# Patient Record
Sex: FEMALE | Race: BLACK OR AFRICAN AMERICAN | Employment: PART TIME
[De-identification: names, ages, dates, MRNs, and addresses within clinical notes are randomized per-mention and may not be internally consistent; named-entity substitution may affect disease eponyms.]

---

## 2017-03-08 ENCOUNTER — TELEPHONE (OUTPATIENT)
Dept: OBGYN | Facility: CLINIC | Age: 52
End: 2017-03-08

## 2017-04-24 ENCOUNTER — OFFICE VISIT (OUTPATIENT)
Dept: OBGYN CLINIC | Age: 52
End: 2017-04-24
Payer: MEDICARE

## 2017-04-24 VITALS
BODY MASS INDEX: 23.49 KG/M2 | SYSTOLIC BLOOD PRESSURE: 147 MMHG | WEIGHT: 155 LBS | DIASTOLIC BLOOD PRESSURE: 84 MMHG | HEIGHT: 68 IN | RESPIRATION RATE: 18 BRPM | HEART RATE: 66 BPM

## 2017-04-24 DIAGNOSIS — N76.0 ACUTE VAGINITIS: Primary | ICD-10-CM

## 2017-04-24 PROCEDURE — 99213 OFFICE O/P EST LOW 20 MIN: CPT | Performed by: SPECIALIST

## 2017-04-24 RX ORDER — CLINDAMYCIN PHOSPHATE 20 MG/G
1 CREAM VAGINAL DAILY
Qty: 1 TUBE | Refills: 0 | Status: SHIPPED | OUTPATIENT
Start: 2017-04-24 | End: 2017-07-18 | Stop reason: SDUPTHER

## 2017-04-24 RX ORDER — FLUCONAZOLE 100 MG/1
100 TABLET ORAL DAILY
Qty: 7 TABLET | Refills: 0 | Status: SHIPPED | OUTPATIENT
Start: 2017-04-24 | End: 2017-05-01

## 2017-04-24 ASSESSMENT — ENCOUNTER SYMPTOMS
APNEA: 0
EYE PAIN: 0
NAUSEA: 0
COUGH: 0
ABDOMINAL PAIN: 0
DIARRHEA: 0
CONSTIPATION: 0
VOMITING: 0
ABDOMINAL DISTENTION: 0

## 2017-07-18 DIAGNOSIS — N76.0 ACUTE VAGINITIS: ICD-10-CM

## 2017-07-18 RX ORDER — CLINDAMYCIN PHOSPHATE 20 MG/G
1 CREAM VAGINAL DAILY
Qty: 1 TUBE | Refills: 0 | Status: SHIPPED | OUTPATIENT
Start: 2017-07-18 | End: 2018-02-15 | Stop reason: SDUPTHER

## 2017-11-14 ENCOUNTER — TELEPHONE (OUTPATIENT)
Dept: OBGYN CLINIC | Age: 52
End: 2017-11-14

## 2017-11-14 RX ORDER — CLINDAMYCIN AND BENZOYL PEROXIDE 10; 50 MG/G; MG/G
GEL TOPICAL
Status: CANCELLED | OUTPATIENT
Start: 2017-11-14

## 2017-11-14 RX ORDER — CLINDAMYCIN PHOSPHATE 20 MG/G
1 CREAM VAGINAL DAILY
Qty: 1 TUBE | Refills: 0 | Status: SHIPPED | OUTPATIENT
Start: 2017-11-14 | End: 2018-07-17 | Stop reason: SDUPTHER

## 2017-11-14 NOTE — TELEPHONE ENCOUNTER
Patient calls to schedule pap apt and mentioned that she would like prescription for cleocin for vaginal discharge/odor. Last seen in April of 2017.

## 2018-01-02 ENCOUNTER — APPOINTMENT (OUTPATIENT)
Dept: GENERAL RADIOLOGY | Age: 53
End: 2018-01-02
Payer: MEDICARE

## 2018-01-02 ENCOUNTER — APPOINTMENT (OUTPATIENT)
Dept: CT IMAGING | Age: 53
End: 2018-01-02
Payer: MEDICARE

## 2018-01-02 ENCOUNTER — HOSPITAL ENCOUNTER (EMERGENCY)
Age: 53
Discharge: HOME | End: 2018-01-02
Attending: EMERGENCY MEDICINE
Payer: MEDICARE

## 2018-01-02 VITALS
BODY MASS INDEX: 23.49 KG/M2 | SYSTOLIC BLOOD PRESSURE: 132 MMHG | OXYGEN SATURATION: 100 % | DIASTOLIC BLOOD PRESSURE: 81 MMHG | WEIGHT: 155 LBS | HEART RATE: 75 BPM | HEIGHT: 68 IN | RESPIRATION RATE: 12 BRPM | TEMPERATURE: 97.7 F
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
## 2018-01-02 DIAGNOSIS — S13.4XXA WHIPLASH INJURIES, INITIAL ENCOUNTER: Primary | ICD-10-CM

## 2018-01-02 DIAGNOSIS — S16.1XXA ACUTE STRAIN OF NECK MUSCLE, INITIAL ENCOUNTER: ICD-10-CM

## 2018-01-02 DIAGNOSIS — S39.012A BACK STRAIN, INITIAL ENCOUNTER: ICD-10-CM

## 2018-01-02 PROCEDURE — 72125 CT NECK SPINE W/O DYE: CPT

## 2018-01-02 PROCEDURE — 99284 EMERGENCY DEPT VISIT MOD MDM: CPT

## 2018-01-02 PROCEDURE — 96372 THER/PROPH/DIAG INJ SC/IM: CPT

## 2018-01-02 PROCEDURE — 72072 X-RAY EXAM THORAC SPINE 3VWS: CPT

## 2018-01-02 PROCEDURE — 6360000002 HC RX W HCPCS: Performed by: EMERGENCY MEDICINE

## 2018-01-02 PROCEDURE — 72100 X-RAY EXAM L-S SPINE 2/3 VWS: CPT

## 2018-01-02 RX ORDER — IBUPROFEN 800 MG/1
800 TABLET ORAL EVERY 8 HOURS PRN
Qty: 30 TABLET | Refills: 0 | Status: SHIPPED | OUTPATIENT
Start: 2018-01-02 | End: 2018-10-16 | Stop reason: ALTCHOICE

## 2018-01-02 RX ORDER — ORPHENADRINE CITRATE 30 MG/ML
60 INJECTION INTRAMUSCULAR; INTRAVENOUS ONCE
Status: COMPLETED | OUTPATIENT
Start: 2018-01-02 | End: 2018-01-02

## 2018-01-02 RX ORDER — CYCLOBENZAPRINE HCL 5 MG
5 TABLET ORAL 3 TIMES DAILY PRN
Qty: 30 TABLET | Refills: 0 | Status: SHIPPED | OUTPATIENT
Start: 2018-01-02 | End: 2018-01-12

## 2018-01-02 RX ORDER — KETOROLAC TROMETHAMINE 30 MG/ML
30 INJECTION, SOLUTION INTRAMUSCULAR; INTRAVENOUS ONCE
Status: COMPLETED | OUTPATIENT
Start: 2018-01-02 | End: 2018-01-02

## 2018-01-02 RX ADMIN — KETOROLAC TROMETHAMINE 30 MG: 30 INJECTION, SOLUTION INTRAMUSCULAR at 21:40

## 2018-01-02 RX ADMIN — ORPHENADRINE CITRATE 60 MG: 30 INJECTION INTRAMUSCULAR; INTRAVENOUS at 21:41

## 2018-01-02 ASSESSMENT — ENCOUNTER SYMPTOMS
SHORTNESS OF BREATH: 0
COUGH: 0
ABDOMINAL PAIN: 0
RHINORRHEA: 0
NAUSEA: 0
BACK PAIN: 1
VOMITING: 0

## 2018-01-02 ASSESSMENT — PAIN SCALES - GENERAL
PAINLEVEL_OUTOF10: 7
PAINLEVEL_OUTOF10: 7

## 2018-01-02 ASSESSMENT — PAIN DESCRIPTION - LOCATION: LOCATION: BACK;NECK

## 2018-01-02 ASSESSMENT — PAIN DESCRIPTION - ORIENTATION: ORIENTATION: MID;LOWER;UPPER

## 2018-01-02 ASSESSMENT — PAIN DESCRIPTION - PAIN TYPE: TYPE: ACUTE PAIN

## 2018-01-02 ASSESSMENT — PAIN DESCRIPTION - DESCRIPTORS: DESCRIPTORS: NUMBNESS
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
## 2018-02-15 DIAGNOSIS — N76.0 ACUTE VAGINITIS: ICD-10-CM

## 2018-02-15 RX ORDER — CLINDAMYCIN PHOSPHATE 20 MG/G
1 CREAM VAGINAL DAILY
Qty: 1 TUBE | Refills: 0 | Status: SHIPPED | OUTPATIENT
Start: 2018-02-15 | End: 2018-07-17 | Stop reason: SDUPTHER

## 2018-07-17 ENCOUNTER — HOSPITAL ENCOUNTER (OUTPATIENT)
Age: 53
Setting detail: SPECIMEN
Discharge: HOME | End: 2018-07-17
Payer: MEDICARE

## 2018-07-17 ENCOUNTER — OFFICE VISIT (OUTPATIENT)
Dept: OBGYN CLINIC | Age: 53
End: 2018-07-17
Payer: MEDICARE

## 2018-07-17 VITALS
HEIGHT: 68 IN | DIASTOLIC BLOOD PRESSURE: 89 MMHG | BODY MASS INDEX: 23.92 KG/M2 | HEART RATE: 59 BPM | WEIGHT: 157.8 LBS | SYSTOLIC BLOOD PRESSURE: 156 MMHG

## 2018-07-17 DIAGNOSIS — R45.86 MOOD SWINGS: ICD-10-CM

## 2018-07-17 DIAGNOSIS — N89.8 VAGINAL DISCHARGE: ICD-10-CM

## 2018-07-17 DIAGNOSIS — R23.2 HOT FLASH NOT DUE TO MENOPAUSE: ICD-10-CM

## 2018-07-17 DIAGNOSIS — Z12.39 BREAST CANCER SCREENING: ICD-10-CM

## 2018-07-17 DIAGNOSIS — N89.8 VAGINAL ODOR: ICD-10-CM

## 2018-07-17 DIAGNOSIS — Z11.3 SCREEN FOR STD (SEXUALLY TRANSMITTED DISEASE): ICD-10-CM

## 2018-07-17 DIAGNOSIS — Z12.4 SCREENING FOR CERVICAL CANCER: Primary | ICD-10-CM

## 2018-07-17 PROCEDURE — 99396 PREV VISIT EST AGE 40-64: CPT | Performed by: CLINICAL NURSE SPECIALIST

## 2018-07-17 RX ORDER — VENLAFAXINE HYDROCHLORIDE 37.5 MG/1
37.5 CAPSULE, EXTENDED RELEASE ORAL DAILY
Qty: 30 CAPSULE | Refills: 0 | Status: SHIPPED | OUTPATIENT
Start: 2018-07-17 | End: 2018-08-23 | Stop reason: DRUGHIGH

## 2018-07-17 RX ORDER — CLINDAMYCIN PHOSPHATE 20 MG/G
1 CREAM VAGINAL NIGHTLY
Qty: 1 TUBE | Refills: 0 | Status: SHIPPED | OUTPATIENT
Start: 2018-07-17 | End: 2018-09-28 | Stop reason: SDUPTHER

## 2018-07-17 RX ORDER — FLUCONAZOLE 100 MG/1
100 TABLET ORAL DAILY
Qty: 7 TABLET | Refills: 0 | Status: SHIPPED | OUTPATIENT
Start: 2018-07-17 | End: 2018-07-24

## 2018-07-17 NOTE — PROGRESS NOTES
[address] OB GYN  [facility] [address]  [address] [address]  Dept: [phone]        DATE OF VISIT:  18        History and Physical    Kandace Walsh    :  1965  CHIEF COMPLAINT:    Chief Complaint   Patient presents with    Annual Exam     Patient is here today for her annual breast exam, pap smear and medication refill. Patient complains of  white discharge and musty odor.  Gynecologic Exam    Medication Check    Vaginal Odor    Vaginal Discharge                    HPI :   Lebron Walsh is a 47 y.o. female who presents for annual well woman exam with pap and STD screening. Patient complains of vaginal discharge white creamy with odor for the past month. The patient was seen and examined. Per the patient bowels are regular. She has no voiding complaints.   She denies any bloating.  _____________________________________________________________________  Past Medical History:   Diagnosis Date    Chronic back pain     Fibroid uterus     Post-menopausal bleeding     UTI (urinary tract infection)                                                                    Past Surgical History:   Procedure Laterality Date    HYSTEROSCOPY  6/4/15    and D&C    OTHER SURGICAL HISTORY      vaginal warts removed    TONSILLECTOMY      at age 12   [address]     Family History   Problem Relation Age of Onset    High Blood Pressure Mother     High Blood Pressure Father     Heart Disease Father      History   Smoking Status    Current Every Day Smoker    Packs/day: 0.25    Years: 25.00    Types: Cigarettes   Smokeless Tobacco    Not on file     History   Alcohol Use    Yes     Comment: social     Current Outpatient Prescriptions   Medication Sig Dispense Refill    fluconazole (DIFLUCAN) 100 MG tablet Take 1 tablet by mouth daily for 7 days 7 tablet 0    clindamycin (CLEOCIN) 2 % vaginal cream Place 1 applicator vaginally nightly Insert into the vagina at

## 2018-07-18 DIAGNOSIS — A74.9 CHLAMYDIA: Primary | ICD-10-CM

## 2018-07-18 LAB
C TRACH DNA GENITAL QL NAA+PROBE: ABNORMAL
DIRECT EXAM: ABNORMAL
Lab: ABNORMAL
N. GONORRHOEAE DNA: NEGATIVE
SPECIMEN DESCRIPTION: ABNORMAL
STATUS: ABNORMAL

## 2018-07-18 RX ORDER — AZITHROMYCIN 500 MG/1
1000 TABLET, FILM COATED ORAL ONCE
Qty: 1 PACKET | Refills: 0 | Status: SHIPPED | OUTPATIENT
Start: 2018-07-18 | End: 2018-07-18

## 2018-08-02 LAB — CYTOLOGY REPORT: NORMAL

## 2018-08-23 ENCOUNTER — OFFICE VISIT (OUTPATIENT)
Dept: OBGYN CLINIC | Age: 53
End: 2018-08-23
Payer: MEDICARE

## 2018-08-23 ENCOUNTER — HOSPITAL ENCOUNTER (OUTPATIENT)
Age: 53
Setting detail: SPECIMEN
Discharge: HOME | End: 2018-08-23
Payer: MEDICARE

## 2018-08-23 VITALS
SYSTOLIC BLOOD PRESSURE: 126 MMHG | DIASTOLIC BLOOD PRESSURE: 72 MMHG | HEART RATE: 66 BPM | BODY MASS INDEX: 23.28 KG/M2 | WEIGHT: 153.6 LBS | HEIGHT: 68 IN

## 2018-08-23 DIAGNOSIS — Z11.3 SCREENING FOR STDS (SEXUALLY TRANSMITTED DISEASES): Primary | ICD-10-CM

## 2018-08-23 DIAGNOSIS — N95.1 HOT FLASH, MENOPAUSAL: ICD-10-CM

## 2018-08-23 DIAGNOSIS — N89.8 VAGINAL DISCHARGE: ICD-10-CM

## 2018-08-23 DIAGNOSIS — Z11.3 SCREENING FOR STDS (SEXUALLY TRANSMITTED DISEASES): ICD-10-CM

## 2018-08-23 PROCEDURE — 99213 OFFICE O/P EST LOW 20 MIN: CPT | Performed by: CLINICAL NURSE SPECIALIST

## 2018-08-23 PROCEDURE — G8427 DOCREV CUR MEDS BY ELIG CLIN: HCPCS | Performed by: CLINICAL NURSE SPECIALIST

## 2018-08-23 PROCEDURE — 4004F PT TOBACCO SCREEN RCVD TLK: CPT | Performed by: CLINICAL NURSE SPECIALIST

## 2018-08-23 PROCEDURE — 3017F COLORECTAL CA SCREEN DOC REV: CPT | Performed by: CLINICAL NURSE SPECIALIST

## 2018-08-23 PROCEDURE — G8420 CALC BMI NORM PARAMETERS: HCPCS | Performed by: CLINICAL NURSE SPECIALIST

## 2018-08-23 RX ORDER — METRONIDAZOLE 7.5 MG/G
GEL VAGINAL
Qty: 1 TUBE | Refills: 0 | Status: SHIPPED | OUTPATIENT
Start: 2018-08-23 | End: 2018-08-30

## 2018-08-23 RX ORDER — FLUCONAZOLE 100 MG/1
100 TABLET ORAL DAILY
Qty: 7 TABLET | Refills: 0 | Status: SHIPPED | OUTPATIENT
Start: 2018-08-23 | End: 2018-08-30

## 2018-08-23 RX ORDER — VENLAFAXINE HYDROCHLORIDE 75 MG/1
75 CAPSULE, EXTENDED RELEASE ORAL DAILY
Qty: 30 CAPSULE | Refills: 0 | Status: SHIPPED | OUTPATIENT
Start: 2018-08-23 | End: 2018-10-01 | Stop reason: SDUPTHER

## 2018-08-23 ASSESSMENT — ENCOUNTER SYMPTOMS
RESPIRATORY NEGATIVE: 1
EYES NEGATIVE: 1
ALLERGIC/IMMUNOLOGIC NEGATIVE: 1
GASTROINTESTINAL NEGATIVE: 1

## 2018-08-23 NOTE — PROGRESS NOTES
Subjective:      Patient ID:  Grace Gipson is a 47 y.o. female who presents for   Chief Complaint   Patient presents with    Sexually Transmitted Diseases     Patient is here today for a reculture.  Gynecologic Exam       HPI     Patient is a 48 yo female who presents for re-culture, was treated for chlamydia 4 weeks ago. Patient states that she did take all the medication and informed her partner. Patient denies any vaginal discharge itching odor or irritation. Patient states that the Effexor has not helped with her hot flashes. Review of Systems   Constitutional: Negative for chills and fever. HENT: Negative. Eyes: Negative. Respiratory: Negative. Cardiovascular: Negative. Gastrointestinal: Negative. Endocrine:        Hot flashes are unchanged since starting the effexor   Genitourinary: Negative for dysuria, menstrual problem and vaginal discharge. Musculoskeletal: Negative. Skin: Negative. Allergic/Immunologic: Negative. Neurological: Negative. Hematological: Negative. Psychiatric/Behavioral: Negative. /72 (Site: Right Arm, Position: Sitting, Cuff Size: Large Adult)   Pulse 66   Ht 5' 8\" (1.727 m)   Wt 153 lb 9.6 oz (69.7 kg)   LMP 02/28/2013   BMI 23.35 kg/m²    Patient's last menstrual period was 02/28/2013. Objective:   Physical Exam   Constitutional: She is oriented to person, place, and time. She appears well-developed and well-nourished. HENT:   Head: Normocephalic and atraumatic. Eyes: Conjunctivae are normal.   Neck: Normal range of motion. Neck supple. Cardiovascular: Normal rate and regular rhythm. Pulmonary/Chest: Effort normal and breath sounds normal.   Abdominal: Bowel sounds are normal.   Genitourinary: Vaginal discharge (moderate creamy white discharge with odor) found. Musculoskeletal: Normal range of motion. Neurological: She is alert and oriented to person, place, and time. Skin: Skin is warm and dry.    Psychiatric:

## 2018-08-24 DIAGNOSIS — A74.9 CHLAMYDIA: Primary | ICD-10-CM

## 2018-08-24 LAB
C TRACH DNA GENITAL QL NAA+PROBE: ABNORMAL
N. GONORRHOEAE DNA: NEGATIVE

## 2018-08-24 RX ORDER — AZITHROMYCIN 500 MG/1
1000 TABLET, FILM COATED ORAL ONCE
Qty: 2 TABLET | Refills: 0 | Status: SHIPPED | OUTPATIENT
Start: 2018-08-24 | End: 2018-08-24

## 2018-08-24 RX ORDER — DOXYCYCLINE HYCLATE 100 MG
100 TABLET ORAL 2 TIMES DAILY
Qty: 28 TABLET | Refills: 0 | Status: SHIPPED | OUTPATIENT
Start: 2018-08-24 | End: 2018-09-07

## 2018-09-04 ENCOUNTER — HOSPITAL ENCOUNTER (OUTPATIENT)
Dept: MAMMOGRAPHY | Age: 53
Discharge: HOME | End: 2018-09-06
Payer: MEDICARE

## 2018-09-04 DIAGNOSIS — Z12.39 BREAST CANCER SCREENING: ICD-10-CM

## 2018-09-04 PROCEDURE — 77067 SCR MAMMO BI INCL CAD: CPT

## 2018-09-06 ENCOUNTER — HOSPITAL ENCOUNTER (OUTPATIENT)
Age: 53
Setting detail: SPECIMEN
Discharge: HOME | End: 2018-09-06
Payer: MEDICARE

## 2018-09-06 ENCOUNTER — OFFICE VISIT (OUTPATIENT)
Dept: OBGYN CLINIC | Age: 53
End: 2018-09-06
Payer: MEDICARE

## 2018-09-06 VITALS
WEIGHT: 153 LBS | SYSTOLIC BLOOD PRESSURE: 130 MMHG | DIASTOLIC BLOOD PRESSURE: 80 MMHG | RESPIRATION RATE: 18 BRPM | HEART RATE: 81 BPM | BODY MASS INDEX: 23.26 KG/M2

## 2018-09-06 DIAGNOSIS — Z11.3 SCREEN FOR STD (SEXUALLY TRANSMITTED DISEASE): ICD-10-CM

## 2018-09-06 DIAGNOSIS — Z86.19 HISTORY OF CHLAMYDIA INFECTION: ICD-10-CM

## 2018-09-06 DIAGNOSIS — Z11.3 SCREEN FOR STD (SEXUALLY TRANSMITTED DISEASE): Primary | ICD-10-CM

## 2018-09-06 PROCEDURE — 3017F COLORECTAL CA SCREEN DOC REV: CPT | Performed by: CLINICAL NURSE SPECIALIST

## 2018-09-06 PROCEDURE — G8420 CALC BMI NORM PARAMETERS: HCPCS | Performed by: CLINICAL NURSE SPECIALIST

## 2018-09-06 PROCEDURE — 4004F PT TOBACCO SCREEN RCVD TLK: CPT | Performed by: CLINICAL NURSE SPECIALIST

## 2018-09-06 PROCEDURE — G8427 DOCREV CUR MEDS BY ELIG CLIN: HCPCS | Performed by: CLINICAL NURSE SPECIALIST

## 2018-09-06 PROCEDURE — 99213 OFFICE O/P EST LOW 20 MIN: CPT | Performed by: CLINICAL NURSE SPECIALIST

## 2018-09-06 ASSESSMENT — ENCOUNTER SYMPTOMS
ALLERGIC/IMMUNOLOGIC NEGATIVE: 1
EYES NEGATIVE: 1
RESPIRATORY NEGATIVE: 1
GASTROINTESTINAL NEGATIVE: 1

## 2018-09-06 NOTE — PROGRESS NOTES
place, and time. Skin: Skin is warm and dry. Psychiatric: She has a normal mood and affect. Her behavior is normal. Judgment and thought content normal.   Vitals reviewed. Assessment:      Diagnosis Orders   1. Screen for STD (sexually transmitted disease)  VAGINITIS DNA PROBE    Chlamydia Trachomatis & Neisseria gonorrhoeae (GC) by amplified detection   2. History of chlamydia infection  VAGINITIS DNA PROBE    Chlamydia Trachomatis & Neisseria gonorrhoeae (GC) by amplified detection           Plan:    patient informed that if Bv will give flagyl or clindamycin and patient is agreeable. Return for as needed. Patient was seen with total face to face time of 15 minutes. More than 50% of this visit was on counseling and education regarding the problems listed below and her options. She was also counseled on her preventative health maintenance recommendations and follow-up.     Electronically signed by: Savita Fu CNP

## 2018-09-07 LAB
C TRACH DNA GENITAL QL NAA+PROBE: NEGATIVE
DIRECT EXAM: NORMAL
Lab: NORMAL
N. GONORRHOEAE DNA: NEGATIVE
SPECIMEN DESCRIPTION: NORMAL
STATUS: NORMAL

## 2018-09-28 DIAGNOSIS — N89.8 VAGINAL DISCHARGE: ICD-10-CM

## 2018-09-28 DIAGNOSIS — N89.8 VAGINAL ODOR: ICD-10-CM

## 2018-10-01 DIAGNOSIS — N95.1 HOT FLASH, MENOPAUSAL: ICD-10-CM

## 2018-10-01 RX ORDER — VENLAFAXINE HYDROCHLORIDE 75 MG/1
75 CAPSULE, EXTENDED RELEASE ORAL DAILY
Qty: 30 CAPSULE | Refills: 3 | Status: SHIPPED | OUTPATIENT
Start: 2018-10-01 | End: 2019-01-21 | Stop reason: SDUPTHER

## 2018-10-01 RX ORDER — CLINDAMYCIN PHOSPHATE 20 MG/G
1 CREAM VAGINAL NIGHTLY
Qty: 1 TUBE | Refills: 0 | Status: SHIPPED | OUTPATIENT
Start: 2018-10-01 | End: 2019-01-21 | Stop reason: SDUPTHER

## 2018-10-16 ENCOUNTER — APPOINTMENT (OUTPATIENT)
Dept: GENERAL RADIOLOGY | Age: 53
End: 2018-10-16
Payer: MEDICARE

## 2018-10-16 ENCOUNTER — HOSPITAL ENCOUNTER (EMERGENCY)
Age: 53
Discharge: LEFT BEFORE DISCHARGE | End: 2018-10-16
Attending: EMERGENCY MEDICINE
Payer: MEDICARE

## 2018-10-16 VITALS
OXYGEN SATURATION: 98 % | SYSTOLIC BLOOD PRESSURE: 154 MMHG | TEMPERATURE: 98.2 F | HEIGHT: 68 IN | HEART RATE: 78 BPM | RESPIRATION RATE: 20 BRPM | BODY MASS INDEX: 23.49 KG/M2 | WEIGHT: 155 LBS | DIASTOLIC BLOOD PRESSURE: 77 MMHG

## 2018-10-16 DIAGNOSIS — J06.9 VIRAL URI WITH COUGH: Primary | ICD-10-CM

## 2018-10-16 PROCEDURE — 71046 X-RAY EXAM CHEST 2 VIEWS: CPT

## 2018-10-16 PROCEDURE — 96374 THER/PROPH/DIAG INJ IV PUSH: CPT

## 2018-10-16 PROCEDURE — 6360000002 HC RX W HCPCS: Performed by: STUDENT IN AN ORGANIZED HEALTH CARE EDUCATION/TRAINING PROGRAM

## 2018-10-16 PROCEDURE — 99283 EMERGENCY DEPT VISIT LOW MDM: CPT

## 2018-10-16 RX ORDER — ALBUTEROL SULFATE 90 UG/1
2 AEROSOL, METERED RESPIRATORY (INHALATION) EVERY 4 HOURS PRN
Qty: 1 INHALER | Refills: 0 | Status: SHIPPED | OUTPATIENT
Start: 2018-10-16 | End: 2019-05-14 | Stop reason: ALTCHOICE

## 2018-10-16 RX ORDER — KETOROLAC TROMETHAMINE 30 MG/ML
30 INJECTION, SOLUTION INTRAMUSCULAR; INTRAVENOUS ONCE
Status: COMPLETED | OUTPATIENT
Start: 2018-10-16 | End: 2018-10-16

## 2018-10-16 RX ORDER — BENZONATATE 100 MG/1
100 CAPSULE ORAL 3 TIMES DAILY PRN
Qty: 10 CAPSULE | Refills: 0 | Status: SHIPPED | OUTPATIENT
Start: 2018-10-16 | End: 2018-11-19 | Stop reason: SDUPTHER

## 2018-10-16 RX ORDER — IBUPROFEN 600 MG/1
600 TABLET ORAL EVERY 6 HOURS PRN
Qty: 30 TABLET | Refills: 0 | Status: SHIPPED | OUTPATIENT
Start: 2018-10-16 | End: 2018-10-22 | Stop reason: SDUPTHER

## 2018-10-16 RX ORDER — GUAIFENESIN/DEXTROMETHORPHAN 100-10MG/5
5 SYRUP ORAL 3 TIMES DAILY PRN
Qty: 120 ML | Refills: 0 | Status: SHIPPED | OUTPATIENT
Start: 2018-10-16 | End: 2018-10-26

## 2018-10-16 RX ADMIN — KETOROLAC TROMETHAMINE 30 MG: 30 INJECTION, SOLUTION INTRAMUSCULAR at 05:14

## 2018-10-16 ASSESSMENT — ENCOUNTER SYMPTOMS
CHEST TIGHTNESS: 0
NAUSEA: 0
EYE DISCHARGE: 0
EYE REDNESS: 0
COLOR CHANGE: 0
SHORTNESS OF BREATH: 0
ABDOMINAL PAIN: 0
VOMITING: 0
SORE THROAT: 0
RHINORRHEA: 1

## 2018-10-16 ASSESSMENT — PAIN DESCRIPTION - ONSET: ONSET: PROGRESSIVE

## 2018-10-16 ASSESSMENT — PAIN DESCRIPTION - LOCATION: LOCATION: CHEST

## 2018-10-16 ASSESSMENT — PAIN DESCRIPTION - PAIN TYPE: TYPE: ACUTE PAIN

## 2018-10-16 ASSESSMENT — PAIN SCALES - GENERAL
PAINLEVEL_OUTOF10: 7
PAINLEVEL_OUTOF10: 7

## 2018-10-16 ASSESSMENT — PAIN DESCRIPTION - FREQUENCY: FREQUENCY: INTERMITTENT

## 2018-10-16 ASSESSMENT — PAIN DESCRIPTION - PROGRESSION: CLINICAL_PROGRESSION: GRADUALLY WORSENING

## 2018-10-16 NOTE — ED PROVIDER NOTES
[facility]  ED  Emergency Department Encounter  Emergency Medicine Resident     Pt Name: Elsie Rodriguez  MRN: [id number]  [place] 1965  Date of evaluation: 10/16/18  PCP:  Francisco J Abbott MD    CHIEF COMPLAINT       Chief Complaint   Patient presents with    Cough    Nasal Congestion    Chest Pain       HISTORY OFPRESENT ILLNESS  (Location/Symptom, Timing/Onset, Context/Setting, Quality, Duration, Modifying Factors,Severity.)      Kandace Walsh is a 46 yo female who presents with Cough and nasal congestion, pleuritic chest pain. This started Friday progressively gotten worse since starting. She has been using DayQuil and ibuprofen home with mild symptom relief. States her chest pain gets worse when she coughs. States she is feeling achy and tired as well and has been sleeping for most of the weekend she states. She denies any fevers, nausea, vomiting, diarrhea. She does smoke 1 pack per day but denies any history of COPD or asthma. PAST MEDICAL / SURGICAL / SOCIAL / FAMILY HISTORY      has a past medical history of Chronic back pain; Fibroid uterus; Post-menopausal bleeding; and UTI (urinary tract infection). has a past surgical history that includes Tubal ligation (8648); other surgical history (1984); Tonsillectomy; and hysteroscopy (6/4/15). Social History     Social History    Marital status: Single     Spouse name: N/A    Number of children: N/A    Years of education: N/A     Occupational History    Not on file.      Social History Main Topics    Smoking status: Current Every Day Smoker     Packs/day: 1.00     Years: 0.00     Types: Cigarettes    Smokeless tobacco: Never Used    Alcohol use Yes      Comment: social    Drug use: No    Sexual activity: No     Other Topics Concern    Not on file     Social History Narrative    No narrative on file       Family History   Problem Relation Age of Onset    High Blood Pressure Mother     High Blood Pressure Father     Heart Disease Father        Allergies:  Codeine    Home Medications:  Prior to Admission medications    Medication Sig Start Date End Date Taking? Authorizing Provider   ibuprofen (ADVIL;MOTRIN) 600 MG tablet Take 1 tablet by mouth every 6 hours as needed for Pain 10/16/18  Yes Samira Salazar DO   benzonatate (TESSALON PERLES) 100 MG capsule Take 1 capsule by mouth 3 times daily as needed for Cough 10/16/18  Yes Samira Salazar DO   albuterol sulfate HFA (PROVENTIL HFA) 108 (90 Base) MCG/ACT inhaler Inhale 2 puffs into the lungs every 4 hours as needed for Wheezing or Shortness of Breath (Space out to every 6 hours as symptoms improve) Space out to every 6 hours as symptoms improve. 10/16/18  Yes Samira Salazar DO   guaiFENesin-dextromethorphan (ROBITUSSIN DM) 100-10 MG/5ML syrup Take 5 mLs by mouth 3 times daily as needed for Cough 10/16/18 10/26/18 Yes Samira Salazar DO   clindamycin (CLEOCIN) 2 % vaginal cream Place 1 applicator vaginally nightly Insert into the vagina at bedtime for 5 nights. 10/1/18   Eve Collet, APRN - CNP   venlafaxine (EFFEXOR XR) 75 MG extended release capsule Take 1 capsule by mouth daily 10/1/18   Eve Collet, APRN - CNP   naproxen (NAPROSYN) 500 MG tablet Take 1 tablet by mouth 2 times daily (with meals) 6/4/15   Michael Powers, DO       REVIEW OF SYSTEMS    (2-9 systems for level 4, 10 or more for level 5)      Review of Systems   Constitutional: Negative for chills and fever. HENT: Positive for congestion and rhinorrhea. Negative for sore throat. Eyes: Negative for discharge and redness. Respiratory: Negative for chest tightness and shortness of breath. Cardiovascular: Positive for chest pain. Negative for palpitations. Gastrointestinal: Negative for abdominal pain, nausea and vomiting. Genitourinary: Negative for dysuria and flank pain. Musculoskeletal: Negative for arthralgias and myalgias. Skin: Negative for color change and rash.

## 2018-10-16 NOTE — ED TRIAGE NOTES
Drove herself here, presents through triage, c/o cold, congestion, cough and chest pain with coughing, started on Friday, has gotten progressively worse. Sts pain is a achy pain, with coughing, 7 on pain scale, nothing makes it better or worse, no fevers, chills, sweats at home. Coughing up green sputum. Denies sore throat. Pink, warm, dry. Appears uncomfortable. Has been taking Nyquil cold medications without relief.

## 2018-10-22 ENCOUNTER — OFFICE VISIT (OUTPATIENT)
Dept: INTERNAL MEDICINE | Age: 53
End: 2018-10-22
Payer: MEDICARE

## 2018-10-22 VITALS
HEART RATE: 73 BPM | WEIGHT: 161 LBS | OXYGEN SATURATION: 98 % | DIASTOLIC BLOOD PRESSURE: 82 MMHG | SYSTOLIC BLOOD PRESSURE: 147 MMHG | BODY MASS INDEX: 24.48 KG/M2

## 2018-10-22 DIAGNOSIS — I10 ESSENTIAL HYPERTENSION: Primary | ICD-10-CM

## 2018-10-22 DIAGNOSIS — Z71.6 ENCOUNTER FOR SMOKING CESSATION COUNSELING: ICD-10-CM

## 2018-10-22 DIAGNOSIS — M77.02 MEDIAL EPICONDYLITIS OF LEFT ELBOW: ICD-10-CM

## 2018-10-22 DIAGNOSIS — Z12.11 SCREENING FOR COLON CANCER: ICD-10-CM

## 2018-10-22 DIAGNOSIS — Z13.220 SCREENING FOR HYPERLIPIDEMIA: ICD-10-CM

## 2018-10-22 PROCEDURE — 99203 OFFICE O/P NEW LOW 30 MIN: CPT | Performed by: NURSE PRACTITIONER

## 2018-10-22 RX ORDER — HYDROCHLOROTHIAZIDE 12.5 MG/1
12.5 TABLET ORAL DAILY
Qty: 30 TABLET | Refills: 3 | Status: SHIPPED | OUTPATIENT
Start: 2018-10-22 | End: 2018-11-19 | Stop reason: SDUPTHER

## 2018-10-22 RX ORDER — IBUPROFEN 600 MG/1
600 TABLET ORAL EVERY 6 HOURS PRN
Qty: 30 TABLET | Refills: 0 | Status: SHIPPED | OUTPATIENT
Start: 2018-10-22 | End: 2018-11-19

## 2018-10-22 RX ORDER — NICOTINE 21 MG/24HR
1 PATCH, TRANSDERMAL 24 HOURS TRANSDERMAL EVERY 24 HOURS
Qty: 45 PATCH | Refills: 0 | Status: SHIPPED | OUTPATIENT
Start: 2018-10-22 | End: 2019-02-18 | Stop reason: ALTCHOICE

## 2018-10-22 ASSESSMENT — PATIENT HEALTH QUESTIONNAIRE - PHQ9
1. LITTLE INTEREST OR PLEASURE IN DOING THINGS: 0
SUM OF ALL RESPONSES TO PHQ QUESTIONS 1-9: 0
2. FEELING DOWN, DEPRESSED OR HOPELESS: 0
SUM OF ALL RESPONSES TO PHQ9 QUESTIONS 1 & 2: 0
SUM OF ALL RESPONSES TO PHQ QUESTIONS 1-9: 0

## 2018-10-22 ASSESSMENT — ENCOUNTER SYMPTOMS
WHEEZING: 0
ABDOMINAL PAIN: 0
BLOOD IN STOOL: 0
TROUBLE SWALLOWING: 0
VOMITING: 0
DIARRHEA: 0
SHORTNESS OF BREATH: 0
ORTHOPNEA: 0

## 2018-10-22 NOTE — PATIENT INSTRUCTIONS
ways of doing your job if your elbow pain is caused by something you do at work. Medicines    · Be safe with medicines. Read and follow all instructions on the label. ¨ If the doctor gave you a prescription medicine for pain, take it as prescribed. ¨ If you are not taking a prescription pain medicine, ask your doctor if you can take an over-the-counter medicine. When should you call for help? Call your doctor now or seek immediate medical care if:    · Your pain is worse.     · You cannot bend your elbow normally.     · Your arm or hand is cool or pale or changes color.     · You have tingling, weakness, or numbness in your hand and fingers.    Watch closely for changes in your health, and be sure to contact your doctor if:    · You have work problems caused by your elbow pain.     · Your pain is not better after 2 weeks. Where can you learn more? Go to [email or web address] and sign in to your [employer] account. Enter [phone] in the [place] box to learn more about \"Tennis Elbow: Care Instructions. \"     If you do not have an account, please click on the \"Sign Up Now\" link. Current as of: November 29, 2017  Content Version: 11.7  © 3635-6010 [employer]. Care instructions adapted under license by [facility] [employer] [facility] ([facility]). If you have questions about a medical condition or this instruction, always ask your healthcare professional. [place] [address] any warranty or liability for your use of this information.

## 2018-10-22 NOTE — PROGRESS NOTES
Visit Information    Have you changed or started any medications since your last visit including any over-the-counter medicines, vitamins, or herbal medicines? no   Are you having any side effects from any of your medications? -  no  Have you stopped taking any of your medications? Is so, why? -  no    Have you seen any other physician or provider since your last visit? Yes - Records Obtained  Have you had any other diagnostic tests since your last visit? Yes - Records Obtained  Have you been seen in the emergency room and/or had an admission to a hospital since we last saw you? Yes - Records Obtained  Have you had your routine dental cleaning in the past 6 months? no    Have you activated your [employer] account? If not, what are your barriers?  Yes     Patient Care Team:  Emily Combs MD as PCP - General (Family Medicine)    Medical History Review  Past Medical, Family, and Social History reviewed and does contribute to the patient presenting condition    Health Maintenance   Topic Date Due    DTaP/Tdap/Td vaccine (1 - Tdap) 04/05/1985    Pneumococcal med risk (1 of 1 - PPSV23) 04/05/1985    Shingles Vaccine (1 of 2 - 2 Dose Series) 04/05/2016    Colon cancer screen colonoscopy  04/05/2016    Lipid screen  04/28/2017    Flu vaccine (1) 09/01/2018    Breast cancer screen  09/04/2020    Cervical cancer screen  07/17/2021    HIV screen  Completed

## 2018-11-19 ENCOUNTER — OFFICE VISIT (OUTPATIENT)
Dept: PRIMARY CARE CLINIC | Age: 53
End: 2018-11-19
Payer: MEDICARE

## 2018-11-19 VITALS
SYSTOLIC BLOOD PRESSURE: 122 MMHG | HEART RATE: 80 BPM | DIASTOLIC BLOOD PRESSURE: 69 MMHG | BODY MASS INDEX: 24.48 KG/M2 | WEIGHT: 161 LBS | OXYGEN SATURATION: 97 %

## 2018-11-19 DIAGNOSIS — J01.90 ACUTE NON-RECURRENT SINUSITIS, UNSPECIFIED LOCATION: ICD-10-CM

## 2018-11-19 DIAGNOSIS — I10 ESSENTIAL HYPERTENSION: Primary | ICD-10-CM

## 2018-11-19 DIAGNOSIS — Z71.6 ENCOUNTER FOR SMOKING CESSATION COUNSELING: ICD-10-CM

## 2018-11-19 PROCEDURE — 99213 OFFICE O/P EST LOW 20 MIN: CPT | Performed by: NURSE PRACTITIONER

## 2018-11-19 RX ORDER — HYDROCHLOROTHIAZIDE 12.5 MG/1
12.5 TABLET ORAL DAILY
Qty: 90 TABLET | Refills: 1 | Status: SHIPPED | OUTPATIENT
Start: 2018-11-19 | End: 2019-05-28 | Stop reason: SINTOL

## 2018-11-19 RX ORDER — NAPROXEN 500 MG/1
500 TABLET ORAL 2 TIMES DAILY WITH MEALS
Qty: 60 TABLET | Refills: 0 | Status: SHIPPED | OUTPATIENT
Start: 2018-11-19 | End: 2019-02-18 | Stop reason: SDUPTHER

## 2018-11-19 RX ORDER — BENZONATATE 100 MG/1
100 CAPSULE ORAL 3 TIMES DAILY PRN
Qty: 10 CAPSULE | Refills: 0 | Status: SHIPPED | OUTPATIENT
Start: 2018-11-19 | End: 2019-09-30 | Stop reason: SDUPTHER

## 2018-11-19 ASSESSMENT — ENCOUNTER SYMPTOMS
VOMITING: 0
SINUS PAIN: 1
RHINORRHEA: 1
COUGH: 1
SORE THROAT: 0
SHORTNESS OF BREATH: 0
WHEEZING: 0

## 2018-11-19 NOTE — PROGRESS NOTES
sulfate HFA (PROVENTIL HFA) 108 (90 Base) MCG/ACT inhaler Inhale 2 puffs into the lungs every 4 hours as needed for Wheezing or Shortness of Breath (Space out to every 6 hours as symptoms improve) Space out to every 6 hours as symptoms improve. Yes Roxane Willis,    clindamycin (CLEOCIN) 2 % vaginal cream Place 1 applicator vaginally nightly Insert into the vagina at bedtime for 5 nights. Yes [place], APRN - CNP   venlafaxine (EFFEXOR XR) 75 MG extended release capsule Take 1 capsule by mouth daily Yes [place] Show, APRN - CNP   naproxen (NAPROSYN) 500 MG tablet Take 1 tablet by mouth 2 times daily (with meals) Yes Phil Rolon, DO        Social History   Substance Use Topics    Smoking status: Current Every Day Smoker     Packs/day: 1.00     Years: 0.00     Types: Cigarettes    Smokeless tobacco: Current User    Alcohol use Yes      Comment: social        Vitals:    11/19/18 1447   BP: 122/69   Site: Right Upper Arm   Position: Sitting   Cuff Size: Medium Adult   Pulse: 80   SpO2: 97%   Weight: 161 lb (73 kg)     Estimated body mass index is 24.48 kg/m² as calculated from the following:    Height as of 10/16/18: 5' 8\" (1.727 m). Weight as of this encounter: 161 lb (73 kg). Physical Exam   Constitutional: She is oriented to person, place, and time. She appears well-developed and well-nourished. HENT:   Head: Normocephalic. Right Ear: Tympanic membrane and ear canal normal.   Left Ear: Tympanic membrane and ear canal normal.   Nose: Right sinus exhibits no maxillary sinus tenderness and no frontal sinus tenderness. Left sinus exhibits no maxillary sinus tenderness and no frontal sinus tenderness. Mouth/Throat: Posterior oropharyngeal erythema present. No posterior oropharyngeal edema. No tonsillar exudate. Eyes: No scleral icterus. Neck: Neck supple. No thyromegaly present. Cardiovascular: Normal rate and regular rhythm. Pulmonary/Chest: Breath sounds normal.   Abdominal: Soft. There is no tenderness. Musculoskeletal: Normal range of motion. She exhibits no edema. Left elbow: She exhibits no swelling and no deformity. Tenderness found. Medial epicondyle and lateral epicondyle tenderness noted. Left wrist: Normal.   -Tinnels   Neurological: She is alert and oriented to person, place, and time. Skin: Skin is warm and dry. Psychiatric: She has a normal mood and affect. Her behavior is normal. Judgment and thought content normal.       ASSESSMENT      ICD-10-CM    1. Essential hypertension I10 hydrochlorothiazide (HYDRODIURIL) 12.5 MG tablet   2. Acute non-recurrent sinusitis, unspecified location J01.90          PLAN:  Orders Placed This Encounter   Medications    hydrochlorothiazide (HYDRODIURIL) 12.5 MG tablet     Sig: Take 1 tablet by mouth daily     Dispense:  90 tablet     Refill:  1    benzonatate (TESSALON PERLES) 100 MG capsule     Sig: Take 1 capsule by mouth 3 times daily as needed for Cough     Dispense:  10 capsule     Refill:  0         1. Educated on course of viral illness. Honey for cough; humidified air, netti pot and warm facial compress for congestion; extra pillows to elevated head at night  2. C/W meds for HTN     Adverse drug reactions discussed with patient  Patient verbalized understanding of all instructions given. No Follow-up on file. An electronic signature was used to authenticate this note. --CHARLINE Burrows CNP on 11/19/2018 at 3:05 PM  Visit Information    Have you changed or started any medications since your last visit including any over-the-counter medicines, vitamins, or herbal medicines? no   Are you having any side effects from any of your medications? -  no  Have you stopped taking any of your medications? Is so, why? -  no    Have you seen any other physician or provider since your last visit? No  Have you had any other diagnostic tests since your last visit?  No  Have you been seen in the emergency room and/or had an admission to a hospital since we last saw you? No  Have you had your routine dental cleaning in the past 6 months? no    Have you activated your [employer] account? If not, what are your barriers?  Yes     Patient Care Team:  CHARLINE Carrizales CNP as PCP - General (Family Medicine)    Medical History Review  Past Medical, Family, and Social History reviewed and does contribute to the patient presenting condition    Health Maintenance   Topic Date Due    Colon cancer screen colonoscopy  04/05/2016    Lipid screen  04/28/2017    Potassium monitoring  07/10/2017    Creatinine monitoring  07/10/2017    DTaP/Tdap/Td vaccine (1 - Tdap) 03/22/2019 (Originally 4/5/1985)    Flu vaccine (1) 04/22/2019 (Originally 9/1/2018)    Pneumococcal med risk (1 of 1 - PPSV23) 04/22/2019 (Originally 4/5/1985)    Shingles Vaccine (1 of 2 - 2 Dose Series) 04/22/2019 (Originally 4/5/2016)    Breast cancer screen  09/04/2020    Cervical cancer screen  07/17/2021    HIV screen  Completed

## 2018-12-11 ENCOUNTER — HOSPITAL ENCOUNTER (OUTPATIENT)
Age: 53
Setting detail: SPECIMEN
Discharge: HOME | End: 2018-12-11
Payer: MEDICARE

## 2018-12-11 ENCOUNTER — OFFICE VISIT (OUTPATIENT)
Dept: OBGYN CLINIC | Age: 53
End: 2018-12-11
Payer: MEDICARE

## 2018-12-11 VITALS
BODY MASS INDEX: 24.49 KG/M2 | RESPIRATION RATE: 20 BRPM | SYSTOLIC BLOOD PRESSURE: 143 MMHG | DIASTOLIC BLOOD PRESSURE: 84 MMHG | WEIGHT: 161.6 LBS | HEART RATE: 78 BPM | HEIGHT: 68 IN

## 2018-12-11 DIAGNOSIS — N89.8 VAGINAL ODOR: ICD-10-CM

## 2018-12-11 DIAGNOSIS — Z11.3 SCREENING FOR STDS (SEXUALLY TRANSMITTED DISEASES): ICD-10-CM

## 2018-12-11 DIAGNOSIS — N76.0 ACUTE VAGINITIS: Primary | ICD-10-CM

## 2018-12-11 PROCEDURE — 99213 OFFICE O/P EST LOW 20 MIN: CPT | Performed by: CLINICAL NURSE SPECIALIST

## 2018-12-11 PROCEDURE — 4004F PT TOBACCO SCREEN RCVD TLK: CPT | Performed by: CLINICAL NURSE SPECIALIST

## 2018-12-11 PROCEDURE — G8427 DOCREV CUR MEDS BY ELIG CLIN: HCPCS | Performed by: CLINICAL NURSE SPECIALIST

## 2018-12-11 PROCEDURE — G8484 FLU IMMUNIZE NO ADMIN: HCPCS | Performed by: CLINICAL NURSE SPECIALIST

## 2018-12-11 PROCEDURE — 3017F COLORECTAL CA SCREEN DOC REV: CPT | Performed by: CLINICAL NURSE SPECIALIST

## 2018-12-11 PROCEDURE — G8420 CALC BMI NORM PARAMETERS: HCPCS | Performed by: CLINICAL NURSE SPECIALIST

## 2018-12-11 RX ORDER — FLUCONAZOLE 100 MG/1
100 TABLET ORAL DAILY
Qty: 7 TABLET | Refills: 0 | Status: SHIPPED | OUTPATIENT
Start: 2018-12-11 | End: 2018-12-18

## 2018-12-11 ASSESSMENT — ENCOUNTER SYMPTOMS
ALLERGIC/IMMUNOLOGIC NEGATIVE: 1
GASTROINTESTINAL NEGATIVE: 1
RESPIRATORY NEGATIVE: 1
EYES NEGATIVE: 1

## 2018-12-12 LAB
DIRECT EXAM: ABNORMAL
Lab: ABNORMAL
SPECIMEN DESCRIPTION: ABNORMAL
STATUS: ABNORMAL

## 2018-12-13 LAB
C TRACH DNA GENITAL QL NAA+PROBE: NEGATIVE
N. GONORRHOEAE DNA: NEGATIVE

## 2019-01-21 ENCOUNTER — TELEPHONE (OUTPATIENT)
Dept: OBGYN CLINIC | Age: 54
End: 2019-01-21

## 2019-01-21 DIAGNOSIS — N95.1 HOT FLASH, MENOPAUSAL: ICD-10-CM

## 2019-01-21 RX ORDER — VENLAFAXINE HYDROCHLORIDE 75 MG/1
75 CAPSULE, EXTENDED RELEASE ORAL DAILY
Qty: 30 CAPSULE | Refills: 3 | Status: SHIPPED | OUTPATIENT
Start: 2019-01-21 | End: 2019-09-30 | Stop reason: SDUPTHER

## 2019-01-21 RX ORDER — CLINDAMYCIN PHOSPHATE 20 MG/G
1 CREAM VAGINAL NIGHTLY
Qty: 1 TUBE | Refills: 1 | Status: SHIPPED | OUTPATIENT
Start: 2019-01-21 | End: 2019-06-03 | Stop reason: ALTCHOICE

## 2019-02-18 ENCOUNTER — OFFICE VISIT (OUTPATIENT)
Dept: PRIMARY CARE CLINIC | Age: 54
End: 2019-02-18
Payer: MEDICARE

## 2019-02-18 ENCOUNTER — HOSPITAL ENCOUNTER (OUTPATIENT)
Age: 54
Discharge: HOME | End: 2019-02-20
Payer: MEDICARE

## 2019-02-18 ENCOUNTER — HOSPITAL ENCOUNTER (OUTPATIENT)
Dept: GENERAL RADIOLOGY | Age: 54
Discharge: HOME | End: 2019-02-20
Payer: MEDICARE

## 2019-02-18 ENCOUNTER — HOSPITAL ENCOUNTER (OUTPATIENT)
Age: 54
Discharge: HOME | End: 2019-02-18
Payer: MEDICARE

## 2019-02-18 VITALS
TEMPERATURE: 97.3 F | OXYGEN SATURATION: 99 % | DIASTOLIC BLOOD PRESSURE: 85 MMHG | SYSTOLIC BLOOD PRESSURE: 151 MMHG | HEART RATE: 68 BPM | BODY MASS INDEX: 23.72 KG/M2 | WEIGHT: 156 LBS

## 2019-02-18 DIAGNOSIS — G89.29 CHRONIC PAIN OF BOTH KNEES: ICD-10-CM

## 2019-02-18 DIAGNOSIS — M25.562 CHRONIC PAIN OF BOTH KNEES: Primary | ICD-10-CM

## 2019-02-18 DIAGNOSIS — M25.562 CHRONIC PAIN OF BOTH KNEES: ICD-10-CM

## 2019-02-18 DIAGNOSIS — I10 ESSENTIAL HYPERTENSION: ICD-10-CM

## 2019-02-18 DIAGNOSIS — Z13.220 SCREENING FOR HYPERLIPIDEMIA: ICD-10-CM

## 2019-02-18 DIAGNOSIS — M54.50 CHRONIC MIDLINE LOW BACK PAIN WITHOUT SCIATICA: ICD-10-CM

## 2019-02-18 DIAGNOSIS — M25.561 CHRONIC PAIN OF BOTH KNEES: Primary | ICD-10-CM

## 2019-02-18 DIAGNOSIS — G89.29 CHRONIC PAIN OF BOTH KNEES: Primary | ICD-10-CM

## 2019-02-18 DIAGNOSIS — G89.29 CHRONIC MIDLINE LOW BACK PAIN WITHOUT SCIATICA: ICD-10-CM

## 2019-02-18 DIAGNOSIS — M79.641 RIGHT HAND PAIN: ICD-10-CM

## 2019-02-18 DIAGNOSIS — M25.561 CHRONIC PAIN OF BOTH KNEES: ICD-10-CM

## 2019-02-18 LAB
ALBUMIN SERPL-MCNC: 4 G/DL (ref 3.5–5.2)
ALBUMIN/GLOBULIN RATIO: 1.4 (ref 1–2.5)
ALP BLD-CCNC: 47 U/L (ref 35–104)
ALT SERPL-CCNC: 10 U/L (ref 5–33)
ANION GAP SERPL CALCULATED.3IONS-SCNC: 10 MMOL/L (ref 9–17)
AST SERPL-CCNC: 19 U/L
BILIRUB SERPL-MCNC: 0.47 MG/DL (ref 0.3–1.2)
BUN BLDV-MCNC: 17 MG/DL (ref 6–20)
BUN/CREAT BLD: NORMAL (ref 9–20)
CALCIUM SERPL-MCNC: 8.9 MG/DL (ref 8.6–10.4)
CHLORIDE BLD-SCNC: 103 MMOL/L (ref 98–107)
CHOLESTEROL, FASTING: 205 MG/DL
CHOLESTEROL/HDL RATIO: 2
CO2: 28 MMOL/L (ref 20–31)
CREAT SERPL-MCNC: 0.69 MG/DL (ref 0.5–0.9)
GFR AFRICAN AMERICAN: >60 ML/MIN
GFR NON-AFRICAN AMERICAN: >60 ML/MIN
GFR SERPL CREATININE-BSD FRML MDRD: NORMAL ML/MIN/{1.73_M2}
GFR SERPL CREATININE-BSD FRML MDRD: NORMAL ML/MIN/{1.73_M2}
GLUCOSE BLD-MCNC: 88 MG/DL (ref 70–99)
HDLC SERPL-MCNC: 102 MG/DL
LDL CHOLESTEROL: 95 MG/DL (ref 0–130)
POTASSIUM SERPL-SCNC: 3.8 MMOL/L (ref 3.7–5.3)
SODIUM BLD-SCNC: 141 MMOL/L (ref 135–144)
TOTAL PROTEIN: 6.9 G/DL (ref 6.4–8.3)
TRIGLYCERIDE, FASTING: 40 MG/DL
VLDLC SERPL CALC-MCNC: ABNORMAL MG/DL (ref 1–30)

## 2019-02-18 PROCEDURE — G8427 DOCREV CUR MEDS BY ELIG CLIN: HCPCS | Performed by: NURSE PRACTITIONER

## 2019-02-18 PROCEDURE — 4004F PT TOBACCO SCREEN RCVD TLK: CPT | Performed by: NURSE PRACTITIONER

## 2019-02-18 PROCEDURE — 99214 OFFICE O/P EST MOD 30 MIN: CPT | Performed by: NURSE PRACTITIONER

## 2019-02-18 PROCEDURE — 80061 LIPID PANEL: CPT

## 2019-02-18 PROCEDURE — G8484 FLU IMMUNIZE NO ADMIN: HCPCS | Performed by: NURSE PRACTITIONER

## 2019-02-18 PROCEDURE — 3017F COLORECTAL CA SCREEN DOC REV: CPT | Performed by: NURSE PRACTITIONER

## 2019-02-18 PROCEDURE — G8420 CALC BMI NORM PARAMETERS: HCPCS | Performed by: NURSE PRACTITIONER

## 2019-02-18 PROCEDURE — 36415 COLL VENOUS BLD VENIPUNCTURE: CPT

## 2019-02-18 PROCEDURE — 73562 X-RAY EXAM OF KNEE 3: CPT

## 2019-02-18 PROCEDURE — 80053 COMPREHEN METABOLIC PANEL: CPT

## 2019-02-18 RX ORDER — MENTHOL AND METHYL SALICYLATE 10; 30 G/100G; G/100G
CREAM TOPICAL 3 TIMES DAILY PRN
Qty: 1 BOTTLE | Refills: 1 | Status: SHIPPED | OUTPATIENT
Start: 2019-02-18 | End: 2020-02-05

## 2019-02-18 RX ORDER — NAPROXEN 500 MG/1
500 TABLET ORAL 2 TIMES DAILY WITH MEALS
Qty: 60 TABLET | Refills: 0 | Status: SHIPPED | OUTPATIENT
Start: 2019-02-18 | End: 2019-12-31 | Stop reason: SDUPTHER

## 2019-02-18 RX ORDER — PREDNISONE 20 MG/1
20 TABLET ORAL DAILY
Qty: 5 TABLET | Refills: 0 | Status: SHIPPED | OUTPATIENT
Start: 2019-02-18 | End: 2019-02-23

## 2019-02-18 ASSESSMENT — PATIENT HEALTH QUESTIONNAIRE - PHQ9
SUM OF ALL RESPONSES TO PHQ QUESTIONS 1-9: 0
1. LITTLE INTEREST OR PLEASURE IN DOING THINGS: 0
2. FEELING DOWN, DEPRESSED OR HOPELESS: 0
SUM OF ALL RESPONSES TO PHQ QUESTIONS 1-9: 0
SUM OF ALL RESPONSES TO PHQ9 QUESTIONS 1 & 2: 0

## 2019-02-18 ASSESSMENT — ENCOUNTER SYMPTOMS
BLOOD IN STOOL: 0
BACK PAIN: 1

## 2019-03-23 ENCOUNTER — TELEPHONE (OUTPATIENT)
Dept: GASTROENTEROLOGY | Age: 54
End: 2019-03-23

## 2019-03-25 DIAGNOSIS — Z12.11 COLON CANCER SCREENING: Primary | ICD-10-CM

## 2019-03-25 RX ORDER — SODIUM, POTASSIUM,MAG SULFATES 17.5-3.13G
SOLUTION, RECONSTITUTED, ORAL ORAL
Qty: 1 BOTTLE | Refills: 0 | Status: SHIPPED | OUTPATIENT
Start: 2019-03-25 | End: 2019-05-14 | Stop reason: ALTCHOICE

## 2019-03-26 ENCOUNTER — TELEPHONE (OUTPATIENT)
Dept: GASTROENTEROLOGY | Age: 54
End: 2019-03-26

## 2019-03-27 ENCOUNTER — ANESTHESIA (OUTPATIENT)
Dept: ENDOSCOPY | Age: 54
End: 2019-03-27
Payer: MEDICARE

## 2019-03-27 ENCOUNTER — ANESTHESIA EVENT (OUTPATIENT)
Dept: ENDOSCOPY | Age: 54
End: 2019-03-27
Payer: MEDICARE

## 2019-03-27 ENCOUNTER — HOSPITAL ENCOUNTER (OUTPATIENT)
Age: 54
Setting detail: OUTPATIENT SURGERY
Discharge: HOME | End: 2019-03-27
Attending: INTERNAL MEDICINE | Admitting: INTERNAL MEDICINE
Payer: MEDICARE

## 2019-03-27 VITALS
HEART RATE: 62 BPM | SYSTOLIC BLOOD PRESSURE: 134 MMHG | HEIGHT: 68 IN | TEMPERATURE: 97.4 F | OXYGEN SATURATION: 100 % | BODY MASS INDEX: 22.73 KG/M2 | DIASTOLIC BLOOD PRESSURE: 55 MMHG | WEIGHT: 150 LBS | RESPIRATION RATE: 16 BRPM

## 2019-03-27 VITALS
OXYGEN SATURATION: 100 % | DIASTOLIC BLOOD PRESSURE: 67 MMHG | RESPIRATION RATE: 18 BRPM | SYSTOLIC BLOOD PRESSURE: 115 MMHG

## 2019-03-27 PROCEDURE — 2500000003 HC RX 250 WO HCPCS: Performed by: SPECIALIST

## 2019-03-27 PROCEDURE — 45380 COLONOSCOPY AND BIOPSY: CPT | Performed by: INTERNAL MEDICINE

## 2019-03-27 PROCEDURE — 2580000003 HC RX 258: Performed by: INTERNAL MEDICINE

## 2019-03-27 PROCEDURE — 7100000010 HC PHASE II RECOVERY - FIRST 15 MIN: Performed by: INTERNAL MEDICINE

## 2019-03-27 PROCEDURE — 2709999900 HC NON-CHARGEABLE SUPPLY: Performed by: INTERNAL MEDICINE

## 2019-03-27 PROCEDURE — 7100000011 HC PHASE II RECOVERY - ADDTL 15 MIN: Performed by: INTERNAL MEDICINE

## 2019-03-27 PROCEDURE — 3609010300 HC COLONOSCOPY W/BIOPSY SINGLE/MULTIPLE: Performed by: INTERNAL MEDICINE

## 2019-03-27 PROCEDURE — 3700000000 HC ANESTHESIA ATTENDED CARE: Performed by: INTERNAL MEDICINE

## 2019-03-27 PROCEDURE — 6360000002 HC RX W HCPCS: Performed by: SPECIALIST

## 2019-03-27 PROCEDURE — 88305 TISSUE EXAM BY PATHOLOGIST: CPT

## 2019-03-27 PROCEDURE — 3700000001 HC ADD 15 MINUTES (ANESTHESIA): Performed by: INTERNAL MEDICINE

## 2019-03-27 RX ORDER — LIDOCAINE HYDROCHLORIDE 10 MG/ML
INJECTION, SOLUTION EPIDURAL; INFILTRATION; INTRACAUDAL; PERINEURAL PRN
Status: DISCONTINUED | OUTPATIENT
Start: 2019-03-27 | End: 2019-03-27 | Stop reason: SDUPTHER

## 2019-03-27 RX ORDER — PROPOFOL 10 MG/ML
INJECTION, EMULSION INTRAVENOUS PRN
Status: DISCONTINUED | OUTPATIENT
Start: 2019-03-27 | End: 2019-03-27 | Stop reason: SDUPTHER

## 2019-03-27 RX ORDER — SODIUM CHLORIDE 9 MG/ML
INJECTION, SOLUTION INTRAVENOUS CONTINUOUS
Status: DISCONTINUED | OUTPATIENT
Start: 2019-03-27 | End: 2019-03-27 | Stop reason: HOSPADM

## 2019-03-27 RX ADMIN — PROPOFOL 40 MG: 10 INJECTION, EMULSION INTRAVENOUS at 16:52

## 2019-03-27 RX ADMIN — LIDOCAINE HYDROCHLORIDE 30 MG: 10 INJECTION, SOLUTION EPIDURAL; INFILTRATION; INTRACAUDAL at 16:34

## 2019-03-27 RX ADMIN — SODIUM CHLORIDE: 9 INJECTION, SOLUTION INTRAVENOUS at 14:59

## 2019-03-27 RX ADMIN — PROPOFOL 300 MG: 10 INJECTION, EMULSION INTRAVENOUS at 16:34

## 2019-03-27 ASSESSMENT — PAIN SCALES - GENERAL
PAINLEVEL_OUTOF10: 0

## 2019-03-27 ASSESSMENT — LIFESTYLE VARIABLES: SMOKING_STATUS: 1

## 2019-03-27 ASSESSMENT — PAIN SCALES - WONG BAKER: WONGBAKER_NUMERICALRESPONSE: 0

## 2019-03-27 ASSESSMENT — PAIN - FUNCTIONAL ASSESSMENT: PAIN_FUNCTIONAL_ASSESSMENT: 0-10

## 2019-04-01 LAB — SURGICAL PATHOLOGY REPORT: NORMAL

## 2019-05-14 ENCOUNTER — OFFICE VISIT (OUTPATIENT)
Dept: OBGYN CLINIC | Age: 54
End: 2019-05-14
Payer: MEDICARE

## 2019-05-14 ENCOUNTER — HOSPITAL ENCOUNTER (OUTPATIENT)
Age: 54
Setting detail: SPECIMEN
Discharge: HOME | End: 2019-05-14
Payer: MEDICARE

## 2019-05-14 VITALS
SYSTOLIC BLOOD PRESSURE: 154 MMHG | RESPIRATION RATE: 19 BRPM | HEIGHT: 68 IN | TEMPERATURE: 97.7 F | WEIGHT: 156.6 LBS | HEART RATE: 67 BPM | BODY MASS INDEX: 23.73 KG/M2 | DIASTOLIC BLOOD PRESSURE: 89 MMHG

## 2019-05-14 DIAGNOSIS — N89.8 VAGINAL DISCHARGE: Primary | ICD-10-CM

## 2019-05-14 DIAGNOSIS — N89.8 VAGINAL ODOR: ICD-10-CM

## 2019-05-14 DIAGNOSIS — N95.1 HOT FLASHES DUE TO MENOPAUSE: ICD-10-CM

## 2019-05-14 DIAGNOSIS — N89.8 VAGINAL DISCHARGE: ICD-10-CM

## 2019-05-14 DIAGNOSIS — N76.1 CHRONIC VAGINITIS: ICD-10-CM

## 2019-05-14 LAB
DIRECT EXAM: ABNORMAL
Lab: ABNORMAL
SPECIMEN DESCRIPTION: ABNORMAL

## 2019-05-14 PROCEDURE — 4004F PT TOBACCO SCREEN RCVD TLK: CPT | Performed by: CLINICAL NURSE SPECIALIST

## 2019-05-14 PROCEDURE — G8420 CALC BMI NORM PARAMETERS: HCPCS | Performed by: CLINICAL NURSE SPECIALIST

## 2019-05-14 PROCEDURE — 3017F COLORECTAL CA SCREEN DOC REV: CPT | Performed by: CLINICAL NURSE SPECIALIST

## 2019-05-14 PROCEDURE — 99213 OFFICE O/P EST LOW 20 MIN: CPT | Performed by: CLINICAL NURSE SPECIALIST

## 2019-05-14 PROCEDURE — G8427 DOCREV CUR MEDS BY ELIG CLIN: HCPCS | Performed by: CLINICAL NURSE SPECIALIST

## 2019-05-14 RX ORDER — GREEN TEA/HOODIA GORDONII 315-12.5MG
1 CAPSULE ORAL DAILY
Qty: 30 TABLET | Refills: 12 | Status: SHIPPED | OUTPATIENT
Start: 2019-05-14 | End: 2020-02-05 | Stop reason: ALTCHOICE

## 2019-05-14 RX ORDER — METRONIDAZOLE 7.5 MG/G
GEL VAGINAL
Qty: 1 TUBE | Refills: 3 | Status: SHIPPED | OUTPATIENT
Start: 2019-05-14 | End: 2019-05-21

## 2019-05-14 ASSESSMENT — ENCOUNTER SYMPTOMS
ALLERGIC/IMMUNOLOGIC NEGATIVE: 1
RESPIRATORY NEGATIVE: 1
EYES NEGATIVE: 1
GASTROINTESTINAL NEGATIVE: 1

## 2019-05-14 NOTE — PROGRESS NOTES
Subjective:      Patient ID:  Dulce Weaver is a 49 y.o. female who presents for   Chief Complaint   Patient presents with    Discuss Medications     Pt would like to try a different hormone medication. HPI     Patient is a 48 yo female who presents for hot flashes and wants a change in medication because it is no longer helping with hot flashes. Patient also reports that she is having vaginal discharge white with odor for the past 2 weeks. Review of Systems   Constitutional: Negative for chills and fever. HENT: Negative. Eyes: Negative. Respiratory: Negative. Cardiovascular: Negative. Gastrointestinal: Negative. Endocrine: Negative. Genitourinary: Positive for vaginal discharge (moderate amount of white discharge with odor for at least 2 weeks). Negative for dysuria and menstrual problem. Musculoskeletal: Negative. Skin: Negative. Allergic/Immunologic: Negative. Neurological: Negative. Hematological: Negative. Psychiatric/Behavioral: Negative. BP (!) 153/87 (Site: Right Upper Arm, Position: Sitting, Cuff Size: Medium Adult)   Pulse 66   Temp 97.7 °F (36.5 °C) (Oral)   Resp 19   Ht 5' 8\" (1.727 m)   Wt 156 lb 9.6 oz (71 kg)   LMP 02/28/2013   BMI 23.81 kg/m²    Patient's last menstrual period was 02/28/2013. Objective:   Physical Exam   Constitutional: She is oriented to person, place, and time. She appears well-developed and well-nourished. HENT:   Head: Normocephalic and atraumatic. Eyes: Conjunctivae are normal.   Neck: Normal range of motion. Neck supple. Cardiovascular: Normal rate and regular rhythm. Pulmonary/Chest: Effort normal and breath sounds normal.   Abdominal: Bowel sounds are normal.   Genitourinary: Vaginal discharge (large amount of thin white discharge with odor) found. Musculoskeletal: Normal range of motion. Neurological: She is oriented to person, place, and time. Skin: Skin is warm and dry.    Psychiatric: She has a

## 2019-05-15 LAB
C TRACH DNA GENITAL QL NAA+PROBE: NEGATIVE
N. GONORRHOEAE DNA: NEGATIVE
SPECIMEN DESCRIPTION: NORMAL

## 2019-05-28 ENCOUNTER — OFFICE VISIT (OUTPATIENT)
Dept: PRIMARY CARE CLINIC | Age: 54
End: 2019-05-28
Payer: MEDICARE

## 2019-05-28 VITALS
HEART RATE: 62 BPM | WEIGHT: 149 LBS | DIASTOLIC BLOOD PRESSURE: 86 MMHG | OXYGEN SATURATION: 98 % | SYSTOLIC BLOOD PRESSURE: 133 MMHG | TEMPERATURE: 98.2 F | BODY MASS INDEX: 22.66 KG/M2

## 2019-05-28 DIAGNOSIS — F17.200 CURRENT SMOKER ON SOME DAYS: ICD-10-CM

## 2019-05-28 DIAGNOSIS — I10 ESSENTIAL HYPERTENSION: Primary | ICD-10-CM

## 2019-05-28 DIAGNOSIS — M65.9 TENOSYNOVITIS OF WRIST: ICD-10-CM

## 2019-05-28 PROCEDURE — 4004F PT TOBACCO SCREEN RCVD TLK: CPT | Performed by: NURSE PRACTITIONER

## 2019-05-28 PROCEDURE — 3017F COLORECTAL CA SCREEN DOC REV: CPT | Performed by: NURSE PRACTITIONER

## 2019-05-28 PROCEDURE — G8427 DOCREV CUR MEDS BY ELIG CLIN: HCPCS | Performed by: NURSE PRACTITIONER

## 2019-05-28 PROCEDURE — 99213 OFFICE O/P EST LOW 20 MIN: CPT | Performed by: NURSE PRACTITIONER

## 2019-05-28 PROCEDURE — G8420 CALC BMI NORM PARAMETERS: HCPCS | Performed by: NURSE PRACTITIONER

## 2019-05-28 RX ORDER — ENALAPRIL MALEATE 10 MG/1
10 TABLET ORAL DAILY
Qty: 30 TABLET | Refills: 5 | Status: SHIPPED | OUTPATIENT
Start: 2019-05-28 | End: 2019-09-30 | Stop reason: SDUPTHER

## 2019-05-28 ASSESSMENT — ENCOUNTER SYMPTOMS
VOMITING: 0
SHORTNESS OF BREATH: 0
TROUBLE SWALLOWING: 0
ABDOMINAL DISTENTION: 0
ABDOMINAL PAIN: 0
BACK PAIN: 0
BLOOD IN STOOL: 0
NAUSEA: 0
CHOKING: 0

## 2019-05-28 NOTE — PROGRESS NOTES
[facility]  [address] [place] [address]  Dept: [phone]  Dept Fax: [phone]    2019     Kandace Walsh (:  1965)is a 49 y.o. female, here for evaluation of the following medical concerns:   Chief Complaint   Patient presents with    Discuss Medications     BP medication        No longer having pain in knees and back    Still having pain in her hands, mostly the right  Lost of repetitive use of right hand at work    Right hand pain, no weakness. Numbness in her fingertips. Also asking about her BP medication. She started retaking last week, but C/o urinating all the time      . Review of Systems   Constitutional: Positive for appetite change (over last week). Negative for diaphoresis and fever. HENT: Negative for drooling and trouble swallowing. Respiratory: Negative for choking and shortness of breath. Cardiovascular: Negative for chest pain. Gastrointestinal: Negative for abdominal distention, abdominal pain, blood in stool, nausea and vomiting. Genitourinary: Negative for dysuria, flank pain, hematuria, pelvic pain and vaginal bleeding. Musculoskeletal: Positive for arthralgias. Negative for back pain, gait problem, neck pain and neck stiffness. Neurological: Positive for numbness. Negative for tremors and weakness. Prior to Visit Medications    Medication Sig Taking?  Authorizing Provider   enalapril (VASOTEC) 10 MG tablet Take 1 tablet by mouth daily Yes CHARLINE Rodrigues CNP   Thumb Spica MISC by Does not apply route Wear daily on left and right hand Yes CHARLINE Rodrigues CNP   Lactobacillus (PROBIOTIC ACIDOPHILUS) TABS Take 1 tablet by mouth daily Yes Sherren Second, APRN - CNP   naproxen (NAPROSYN) 500 MG tablet Take 1 tablet by mouth 2 times daily (with meals) Yes CHARLINE Rodrigues CNP   menthol-methyl salicylate (ICY HOT) 16-43 % external cream Apply topically 3 times daily as needed for Pain Yes CHARLINE Kerr CNP   venlafaxine (EFFEXOR XR) 75 MG extended release capsule Take 1 capsule by mouth daily Yes CHARLINE Hong CNP   clindamycin (CLEOCIN) 2 % vaginal cream Place 1 applicator vaginally nightly Insert at bedtime for 5 nights. Yes CHARLINE Hong CNP   benzonatate (TESSALON PERLES) 100 MG capsule Take 1 capsule by mouth 3 times daily as needed for Cough Yes CHARLINE Kerr CNP        Social History     Tobacco Use    Smoking status: Current Every Day Smoker     Packs/day: 1.00     Years: 0.00     Pack years: 0.00     Types: Cigarettes    Smokeless tobacco: Never Used   Substance Use Topics    Alcohol use: Yes     Comment: social        Vitals:    05/28/19 1458   BP: 133/86   Site: Right Upper Arm   Position: Sitting   Cuff Size: Medium Adult   Pulse: 62   Temp: 98.2 °F (36.8 °C)   TempSrc: Oral   SpO2: 98%   Weight: 149 lb (67.6 kg)     Estimated body mass index is 22.66 kg/m² as calculated from the following:    Height as of 5/14/19: 5' 8\" (1.727 m). Weight as of this encounter: 149 lb (67.6 kg). DIAGNOSTIC FINDINGS:  CBC:  Lab Results   Component Value Date    WBC 7.3 07/10/2016    HGB 14.1 07/10/2016     07/10/2016     04/28/2012       BMP:    Lab Results   Component Value Date     02/18/2019    K 3.8 02/18/2019     02/18/2019    CO2 28 02/18/2019    BUN 17 02/18/2019    CREATININE 0.69 02/18/2019    GLUCOSE 88 02/18/2019    GLUCOSE 87 04/28/2012       HEMOGLOBIN A1C: No results found for: LABA1C    FASTING LIPID PANEL:  Lab Results   Component Value Date    CHOL 185 04/28/2012     02/18/2019    TRIG 44 04/28/2012       Physical Exam   Constitutional: She is oriented to person, place, and time. She appears well-developed and well-nourished. HENT:   Head: Normocephalic. Eyes: No scleral icterus. Neck: Neck supple. No thyromegaly present. Cardiovascular: Normal rate and regular rhythm. given.    · Patient given educational materials - see patient instructions      · Patient advised to contact scheduling offices for any referrals or imaging orders  placed today if they have not been contacted in 48 hours. Return in about 4 months (around 9/28/2019). An electronic signature was used to authenticate this note. --CHARLINE Zarco CNP on 5/28/2019 at 4:29 PM  Visit Information    Have you changed or started any medications since your last visit including any over-the-counter medicines, vitamins, or herbal medicines? no   Are you having any side effects from any of your medications? -  yes - bp med  Have you stopped taking any of your medications? Is so, why? -  no    Have you seen any other physician or provider since your last visit? Yes - Records Requested  Have you had any other diagnostic tests since your last visit? No  Have you been seen in the emergency room and/or had an admission to a hospital since we last saw you? No  Have you had your routine dental cleaning in the past 6 months? yes     Have you activated your [employer] account? If not, what are your barriers?  No     Patient Care Team:  CHARLINE Zarco CNP as PCP - General (Family Medicine)    Medical History Review  Past Medical, Family, and Social History reviewed and does not contribute to the patient presenting condition    Health Maintenance   Topic Date Due    Pneumococcal 0-64 years Vaccine (1 of 1 - PPSV23) 04/05/1972    DTaP/Tdap/Td vaccine (1 - Tdap) 04/05/1985    Shingles Vaccine (1 of 2) 04/05/2016    Flu vaccine (Season Ended) 09/01/2019    Potassium monitoring  02/18/2020    Creatinine monitoring  02/18/2020    Colon cancer screen colonoscopy  03/27/2020    Breast cancer screen  09/04/2020    Cervical cancer screen  07/17/2021    Lipid screen  02/18/2024    HIV screen  Completed

## 2019-06-03 ENCOUNTER — OFFICE VISIT (OUTPATIENT)
Dept: PRIMARY CARE CLINIC | Age: 54
End: 2019-06-03
Payer: MEDICARE

## 2019-06-03 VITALS
HEART RATE: 69 BPM | BODY MASS INDEX: 23.29 KG/M2 | DIASTOLIC BLOOD PRESSURE: 89 MMHG | WEIGHT: 153.2 LBS | SYSTOLIC BLOOD PRESSURE: 153 MMHG | TEMPERATURE: 97.5 F

## 2019-06-03 DIAGNOSIS — M25.532 LEFT WRIST PAIN: Primary | ICD-10-CM

## 2019-06-03 DIAGNOSIS — A08.4 VIRAL GASTROENTERITIS: Primary | ICD-10-CM

## 2019-06-03 PROCEDURE — G8420 CALC BMI NORM PARAMETERS: HCPCS | Performed by: INTERNAL MEDICINE

## 2019-06-03 PROCEDURE — 3017F COLORECTAL CA SCREEN DOC REV: CPT | Performed by: INTERNAL MEDICINE

## 2019-06-03 PROCEDURE — 4004F PT TOBACCO SCREEN RCVD TLK: CPT | Performed by: INTERNAL MEDICINE

## 2019-06-03 PROCEDURE — G8427 DOCREV CUR MEDS BY ELIG CLIN: HCPCS | Performed by: INTERNAL MEDICINE

## 2019-06-03 PROCEDURE — 99202 OFFICE O/P NEW SF 15 MIN: CPT | Performed by: INTERNAL MEDICINE

## 2019-06-03 RX ORDER — ONDANSETRON 4 MG/1
4 TABLET, FILM COATED ORAL 3 TIMES DAILY PRN
Qty: 20 TABLET | Refills: 0 | Status: SHIPPED | OUTPATIENT
Start: 2019-06-03 | End: 2019-07-27 | Stop reason: SDUPTHER

## 2019-06-03 ASSESSMENT — ENCOUNTER SYMPTOMS
VOMITING: 1
ABDOMINAL PAIN: 1

## 2019-06-03 NOTE — PROGRESS NOTES
left and right hand 2 each 0    benzonatate (TESSALON PERLES) 100 MG capsule Take 1 capsule by mouth 3 times daily as needed for Cough 10 capsule 0     No current facility-administered medications for this visit. Allergies   Allergen Reactions    Codeine Nausea And Vomiting       Health Maintenance   Topic Date Due    Pneumococcal 0-64 years Vaccine (1 of 1 - PPSV23) 04/05/1972    DTaP/Tdap/Td vaccine (1 - Tdap) 04/05/1985    Shingles Vaccine (1 of 2) 04/05/2016    Flu vaccine (Season Ended) 09/01/2019    Potassium monitoring  02/18/2020    Creatinine monitoring  02/18/2020    Colon cancer screen colonoscopy  03/27/2020    Breast cancer screen  09/04/2020    Cervical cancer screen  07/17/2021    Lipid screen  02/18/2024    HIV screen  Completed       Controlled Substances Monitoring:      HPI:     Emesis    This is a new problem. The current episode started in the past 7 days (since thursday). The problem occurs 2 to 4 times per day. The problem has been gradually improving. There has been no fever. Associated symptoms include abdominal pain. Risk factors include ill contacts (at work). She has tried diet change for the symptoms. The treatment provided moderate relief. ROS:     Review of Systems   Gastrointestinal: Positive for abdominal pain and vomiting. All other systems reviewed and are negative. Objective:     Physical Exam   Constitutional: She is oriented to person, place, and time. She appears well-developed and well-nourished. HENT:   Head: Normocephalic and atraumatic. Pulmonary/Chest: Effort normal and breath sounds normal.   Abdominal: Soft. Bowel sounds are normal. There is tenderness (biilaterally under ribs). Musculoskeletal: She exhibits no edema. Neurological: She is alert and oriented to person, place, and time. Skin: Skin is warm and dry. Psychiatric: She has a normal mood and affect. Her behavior is normal.       Suggest brat diet.

## 2019-06-03 NOTE — PROGRESS NOTES
left and right hand 2 each 0    benzonatate (TESSALON PERLES) 100 MG capsule Take 1 capsule by mouth 3 times daily as needed for Cough 10 capsule 0     No current facility-administered medications for this visit.       Allergies   Allergen Reactions    Codeine Nausea And Vomiting       Health Maintenance   Topic Date Due    Pneumococcal 0-64 years Vaccine (1 of 1 - PPSV23) 04/05/1972    DTaP/Tdap/Td vaccine (1 - Tdap) 04/05/1985    Shingles Vaccine (1 of 2) 04/05/2016    Flu vaccine (Season Ended) 09/01/2019    Potassium monitoring  02/18/2020    Creatinine monitoring  02/18/2020    Colon cancer screen colonoscopy  03/27/2020    Breast cancer screen  09/04/2020    Cervical cancer screen  07/17/2021    Lipid screen  02/18/2024    HIV screen  Completed       Controlled Substances Monitoring:      HPI:     HPI    ROS:     Review of Systems    Objective:     Physical Exam

## 2019-06-05 ENCOUNTER — OFFICE VISIT (OUTPATIENT)
Dept: ORTHOPEDIC SURGERY | Age: 54
End: 2019-06-05
Payer: MEDICARE

## 2019-06-05 VITALS — BODY MASS INDEX: 23.22 KG/M2 | HEIGHT: 68 IN | WEIGHT: 153.22 LBS

## 2019-06-05 DIAGNOSIS — G56.03 BILATERAL CARPAL TUNNEL SYNDROME: ICD-10-CM

## 2019-06-05 DIAGNOSIS — M25.531 RIGHT WRIST PAIN: Primary | ICD-10-CM

## 2019-06-05 DIAGNOSIS — M77.8 TENDINITIS OF EXTENSOR TENDON OF RIGHT HAND: ICD-10-CM

## 2019-06-05 PROCEDURE — 99203 OFFICE O/P NEW LOW 30 MIN: CPT | Performed by: STUDENT IN AN ORGANIZED HEALTH CARE EDUCATION/TRAINING PROGRAM

## 2019-06-05 RX ORDER — METHYLPREDNISOLONE 4 MG/1
TABLET ORAL
Qty: 1 KIT | Refills: 0 | Status: SHIPPED | OUTPATIENT
Start: 2019-06-05 | End: 2019-06-11

## 2019-06-05 RX ORDER — MELOXICAM 15 MG/1
15 TABLET ORAL DAILY
Qty: 30 TABLET | Refills: 3 | Status: SHIPPED | OUTPATIENT
Start: 2019-06-05 | End: 2019-09-09 | Stop reason: ALTCHOICE

## 2019-06-09 NOTE — PROGRESS NOTES
[facility]  [facility]  [address] [place], [place] [address]y [address] [address]  Dept: [phone]    Ambulatory Orthopedic Office Visit      CHIEF COMPLAINT:    Chief Complaint   Patient presents with    Wrist Pain     bilateral     HISTORY OF PRESENT ILLNESS:    The patient is a 49 y.o. female who is being seen at the request of CHARLINE Pollack CNP presents for right wrist pain and mild left wrist pain. She does have numbness nad tingling as well that can wake her up at night. She has tried Naproxen. She has not tried splinting or injections yet. She has not had an EMG. This has been going on the past few months. She changed her jobs in January where she was using her wrist more, however she states she has now stopped doing that job. Denies any injuries. Denies fevers, chills, shortness of breath, or chest pain. Past Medical History:    Past Medical History:   Diagnosis Date    Chronic back pain     Fibroid uterus     Post-menopausal bleeding     UTI (urinary tract infection)      Past Surgical History:    Past Surgical History:   Procedure Laterality Date    COLONOSCOPY N/A 3/27/2019    COLONOSCOPY WITH BIOPSY performed by Ki Fuentes MD at [facility]    HYSTEROSCOPY  6/4/15    and D&C    OTHER SURGICAL HISTORY  1984    vaginal warts removed    TONSILLECTOMY      at age 12   [address]     Current Medications:   Current Outpatient Medications   Medication Sig Dispense Refill    methylPREDNISolone (MEDROL DOSEPACK) 4 MG tablet Take by mouth. 1 kit 0    meloxicam (MOBIC) 15 MG tablet Take 1 tablet by mouth daily 30 tablet 3    Misc.  Devices MISC Bilateral wrist brace 1 Device 0    ondansetron (ZOFRAN) 4 MG tablet Take 1 tablet by mouth 3 times daily as needed for Nausea or Vomiting 20 tablet 0    enalapril (VASOTEC) 10 MG tablet Take 1 tablet by mouth daily 30 tablet 5    Thumb Spica MISC by Does not apply route Wear daily on left and right hand 2 each 0    acute fracture    Assessment/Plan:   Kandace Walsh is a 49y.o. year old female   Right wrist extensor tendonitis  B/L mild carpal tunnel symptoms    Plan at this is conservative treatment  Bilateral wrist splints ordered today  Prescribed Mobic  Prescribed Medrol dose pack and would like patient to document where and how long she gets relief of her symptoms.    Tylenol as needed  If not improved will consider injection  As well EMG later  Follow up in 4 weeks    ----------------------------------------  Aylin Walker DO  PGY-1, Department of [address], [facility]

## 2019-07-28 RX ORDER — ONDANSETRON 4 MG/1
4 TABLET, FILM COATED ORAL PRN
Qty: 30 TABLET | Refills: 2 | Status: SHIPPED | OUTPATIENT
Start: 2019-07-28 | End: 2019-12-31

## 2019-08-12 ENCOUNTER — OFFICE VISIT (OUTPATIENT)
Dept: ORTHOPEDIC SURGERY | Age: 54
End: 2019-08-12
Payer: MEDICARE

## 2019-08-12 VITALS — WEIGHT: 153.22 LBS | HEIGHT: 68 IN | BODY MASS INDEX: 23.22 KG/M2

## 2019-08-12 DIAGNOSIS — M77.8 EXTENSOR TENDINITIS OF HAND: Primary | ICD-10-CM

## 2019-08-12 PROCEDURE — 99213 OFFICE O/P EST LOW 20 MIN: CPT | Performed by: STUDENT IN AN ORGANIZED HEALTH CARE EDUCATION/TRAINING PROGRAM

## 2019-08-12 RX ORDER — METHYLPREDNISOLONE 4 MG/1
TABLET ORAL
Qty: 1 KIT | Refills: 0 | Status: SHIPPED | OUTPATIENT
Start: 2019-08-12 | End: 2019-08-18

## 2019-09-09 ENCOUNTER — OFFICE VISIT (OUTPATIENT)
Dept: PRIMARY CARE CLINIC | Age: 54
End: 2019-09-09
Payer: MEDICARE

## 2019-09-09 VITALS
WEIGHT: 137 LBS | BODY MASS INDEX: 20.84 KG/M2 | TEMPERATURE: 98.4 F | HEART RATE: 74 BPM | SYSTOLIC BLOOD PRESSURE: 119 MMHG | DIASTOLIC BLOOD PRESSURE: 73 MMHG

## 2019-09-09 DIAGNOSIS — R10.33 PERIUMBILICAL PAIN: Primary | ICD-10-CM

## 2019-09-09 DIAGNOSIS — R19.7 DIARRHEA OF PRESUMED INFECTIOUS ORIGIN: ICD-10-CM

## 2019-09-09 PROCEDURE — 99214 OFFICE O/P EST MOD 30 MIN: CPT | Performed by: INTERNAL MEDICINE

## 2019-09-09 PROCEDURE — G8427 DOCREV CUR MEDS BY ELIG CLIN: HCPCS | Performed by: INTERNAL MEDICINE

## 2019-09-09 PROCEDURE — 3017F COLORECTAL CA SCREEN DOC REV: CPT | Performed by: INTERNAL MEDICINE

## 2019-09-09 PROCEDURE — G8420 CALC BMI NORM PARAMETERS: HCPCS | Performed by: INTERNAL MEDICINE

## 2019-09-09 PROCEDURE — 4004F PT TOBACCO SCREEN RCVD TLK: CPT | Performed by: INTERNAL MEDICINE

## 2019-09-09 ASSESSMENT — ENCOUNTER SYMPTOMS
ABDOMINAL PAIN: 1
DIARRHEA: 1
VOMITING: 1

## 2019-09-09 NOTE — LETTER
[facility]  [facility] [address]  Phone: [phone]  Fax: [phone]    Carmen Harris MD        September 9, 2019     Patient: Sarah Walsh   YOB: 1965   Date of Visit: 9/9/2019       To Whom it May Concern:    Kandace Walsh was seen in my clinic on 9/9/2019. She may return to work on 9/10/2019. If you have any questions or concerns, please don't hesitate to call.     Sincerely,         Carmen Harris MD

## 2019-09-09 NOTE — PROGRESS NOTES
(once or twice day). Nothing aggravates the symptoms. There are no known risk factors. She has tried nothing for the symptoms. The treatment provided no relief. Has not been able to work since Tuesday. ROS:     Review of Systems   Gastrointestinal: Positive for abdominal pain, diarrhea and vomiting (once or twice day). All other systems reviewed and are negative. Objective:     Physical Exam   Constitutional: She is oriented to person, place, and time. She appears well-developed and well-nourished. HENT:   Head: Normocephalic and atraumatic. Neck: Neck supple. Cardiovascular: Normal rate. Pulmonary/Chest: Effort normal and breath sounds normal.   Abdominal: Soft. Bowel sounds are normal. There is no tenderness. There is no guarding. Musculoskeletal: She exhibits no edema. Neurological: She is alert and oriented to person, place, and time. Skin: Skin is warm and dry. Psychiatric: She has a normal mood and affect. Her behavior is normal.   Vitals reviewed. Missed work since Tuesday. Wants to return   To work tomorrow.

## 2019-09-25 PROBLEM — D12.6 SERRATED ADENOMA OF COLON: Status: ACTIVE | Noted: 2019-09-25

## 2019-09-30 ENCOUNTER — HOSPITAL ENCOUNTER (OUTPATIENT)
Age: 54
Discharge: HOME | End: 2019-10-02
Payer: MEDICARE

## 2019-09-30 ENCOUNTER — HOSPITAL ENCOUNTER (OUTPATIENT)
Dept: GENERAL RADIOLOGY | Age: 54
Discharge: HOME | End: 2019-10-02
Payer: MEDICARE

## 2019-09-30 ENCOUNTER — OFFICE VISIT (OUTPATIENT)
Dept: PRIMARY CARE CLINIC | Age: 54
End: 2019-09-30
Payer: MEDICARE

## 2019-09-30 VITALS
WEIGHT: 139 LBS | BODY MASS INDEX: 21.14 KG/M2 | DIASTOLIC BLOOD PRESSURE: 90 MMHG | SYSTOLIC BLOOD PRESSURE: 158 MMHG | OXYGEN SATURATION: 100 % | HEART RATE: 61 BPM | TEMPERATURE: 97 F

## 2019-09-30 DIAGNOSIS — J40 BRONCHITIS: ICD-10-CM

## 2019-09-30 DIAGNOSIS — N95.1 HOT FLASH, MENOPAUSAL: ICD-10-CM

## 2019-09-30 DIAGNOSIS — M65.9 TENOSYNOVITIS OF BOTH HANDS: ICD-10-CM

## 2019-09-30 DIAGNOSIS — I10 ESSENTIAL HYPERTENSION: Primary | ICD-10-CM

## 2019-09-30 PROCEDURE — 71046 X-RAY EXAM CHEST 2 VIEWS: CPT

## 2019-09-30 PROCEDURE — 99214 OFFICE O/P EST MOD 30 MIN: CPT | Performed by: NURSE PRACTITIONER

## 2019-09-30 PROCEDURE — G8427 DOCREV CUR MEDS BY ELIG CLIN: HCPCS | Performed by: NURSE PRACTITIONER

## 2019-09-30 PROCEDURE — 4004F PT TOBACCO SCREEN RCVD TLK: CPT | Performed by: NURSE PRACTITIONER

## 2019-09-30 PROCEDURE — 3017F COLORECTAL CA SCREEN DOC REV: CPT | Performed by: NURSE PRACTITIONER

## 2019-09-30 PROCEDURE — G8420 CALC BMI NORM PARAMETERS: HCPCS | Performed by: NURSE PRACTITIONER

## 2019-09-30 RX ORDER — VENLAFAXINE HYDROCHLORIDE 75 MG/1
75 CAPSULE, EXTENDED RELEASE ORAL DAILY
Qty: 30 CAPSULE | Refills: 3 | Status: SHIPPED | OUTPATIENT
Start: 2019-09-30 | End: 2019-09-30

## 2019-09-30 RX ORDER — ESTRADIOL 0.5 MG/1
0.5 TABLET ORAL DAILY
Qty: 30 TABLET | Refills: 3 | Status: SHIPPED | OUTPATIENT
Start: 2019-09-30 | End: 2020-04-23

## 2019-09-30 RX ORDER — BENZONATATE 100 MG/1
100 CAPSULE ORAL 3 TIMES DAILY PRN
Qty: 10 CAPSULE | Refills: 0 | Status: SHIPPED | OUTPATIENT
Start: 2019-09-30 | End: 2020-02-26

## 2019-09-30 RX ORDER — ENALAPRIL MALEATE 10 MG/1
10 TABLET ORAL DAILY
Qty: 30 TABLET | Refills: 5 | Status: SHIPPED | OUTPATIENT
Start: 2019-09-30 | End: 2020-05-04 | Stop reason: DRUGHIGH

## 2019-09-30 RX ORDER — NAPROXEN 500 MG/1
500 TABLET ORAL 2 TIMES DAILY WITH MEALS
Qty: 60 TABLET | Refills: 0 | Status: CANCELLED | OUTPATIENT
Start: 2019-09-30

## 2019-09-30 ASSESSMENT — ENCOUNTER SYMPTOMS
ABDOMINAL DISTENTION: 0
SINUS PAIN: 0
NAUSEA: 0
RHINORRHEA: 0
CHOKING: 0
BACK PAIN: 0
SHORTNESS OF BREATH: 0
TROUBLE SWALLOWING: 0
WHEEZING: 0
DIARRHEA: 0
BLOOD IN STOOL: 0
ABDOMINAL PAIN: 0
COUGH: 1
VOMITING: 0
BLURRED VISION: 0
SORE THROAT: 0

## 2019-11-04 ENCOUNTER — OFFICE VISIT (OUTPATIENT)
Dept: PRIMARY CARE CLINIC | Age: 54
End: 2019-11-04
Payer: MEDICARE

## 2019-11-04 VITALS
TEMPERATURE: 97 F | DIASTOLIC BLOOD PRESSURE: 74 MMHG | SYSTOLIC BLOOD PRESSURE: 119 MMHG | WEIGHT: 140 LBS | BODY MASS INDEX: 21.29 KG/M2 | OXYGEN SATURATION: 100 % | HEART RATE: 60 BPM

## 2019-11-04 DIAGNOSIS — M54.50 CHRONIC MIDLINE LOW BACK PAIN WITHOUT SCIATICA: ICD-10-CM

## 2019-11-04 DIAGNOSIS — G89.29 CHRONIC PAIN OF BOTH KNEES: ICD-10-CM

## 2019-11-04 DIAGNOSIS — M25.562 CHRONIC PAIN OF BOTH KNEES: ICD-10-CM

## 2019-11-04 DIAGNOSIS — M25.561 CHRONIC PAIN OF BOTH KNEES: ICD-10-CM

## 2019-11-04 DIAGNOSIS — G89.29 CHRONIC MIDLINE LOW BACK PAIN WITHOUT SCIATICA: ICD-10-CM

## 2019-11-04 DIAGNOSIS — J02.9 SORE THROAT: Primary | ICD-10-CM

## 2019-11-04 LAB — S PYO AG THROAT QL: NORMAL

## 2019-11-04 PROCEDURE — 99213 OFFICE O/P EST LOW 20 MIN: CPT | Performed by: INTERNAL MEDICINE

## 2019-11-04 PROCEDURE — G8420 CALC BMI NORM PARAMETERS: HCPCS | Performed by: INTERNAL MEDICINE

## 2019-11-04 PROCEDURE — 4004F PT TOBACCO SCREEN RCVD TLK: CPT | Performed by: INTERNAL MEDICINE

## 2019-11-04 PROCEDURE — G8484 FLU IMMUNIZE NO ADMIN: HCPCS | Performed by: INTERNAL MEDICINE

## 2019-11-04 PROCEDURE — G8427 DOCREV CUR MEDS BY ELIG CLIN: HCPCS | Performed by: INTERNAL MEDICINE

## 2019-11-04 PROCEDURE — 87880 STREP A ASSAY W/OPTIC: CPT | Performed by: INTERNAL MEDICINE

## 2019-11-04 PROCEDURE — 3017F COLORECTAL CA SCREEN DOC REV: CPT | Performed by: INTERNAL MEDICINE

## 2019-11-04 RX ORDER — NAPROXEN 500 MG/1
500 TABLET ORAL 2 TIMES DAILY WITH MEALS
Qty: 60 TABLET | Refills: 0 | Status: CANCELLED | OUTPATIENT
Start: 2019-11-04

## 2019-11-04 RX ORDER — AZITHROMYCIN 250 MG/1
TABLET, FILM COATED ORAL
Qty: 1 PACKET | Refills: 0 | Status: SHIPPED | OUTPATIENT
Start: 2019-11-04 | End: 2019-12-31

## 2019-11-04 ASSESSMENT — ENCOUNTER SYMPTOMS
COUGH: 1
SORE THROAT: 1

## 2019-12-27 ENCOUNTER — HOSPITAL ENCOUNTER (EMERGENCY)
Age: 54
Discharge: HOME | End: 2019-12-27
Attending: EMERGENCY MEDICINE
Payer: MEDICARE

## 2019-12-27 ENCOUNTER — APPOINTMENT (OUTPATIENT)
Dept: GENERAL RADIOLOGY | Age: 54
End: 2019-12-27
Payer: MEDICARE

## 2019-12-27 VITALS
SYSTOLIC BLOOD PRESSURE: 150 MMHG | HEART RATE: 81 BPM | TEMPERATURE: 98.4 F | WEIGHT: 145 LBS | OXYGEN SATURATION: 100 % | BODY MASS INDEX: 22.05 KG/M2 | DIASTOLIC BLOOD PRESSURE: 96 MMHG | RESPIRATION RATE: 18 BRPM

## 2019-12-27 DIAGNOSIS — S69.92XA HAND INJURY, LEFT, INITIAL ENCOUNTER: Primary | ICD-10-CM

## 2019-12-27 PROCEDURE — 99283 EMERGENCY DEPT VISIT LOW MDM: CPT

## 2019-12-27 PROCEDURE — 73110 X-RAY EXAM OF WRIST: CPT

## 2019-12-27 RX ORDER — IBUPROFEN 400 MG/1
400 TABLET ORAL EVERY 6 HOURS PRN
Qty: 30 TABLET | Refills: 0 | Status: SHIPPED | OUTPATIENT
Start: 2019-12-27 | End: 2021-12-22

## 2019-12-27 ASSESSMENT — PAIN DESCRIPTION - PAIN TYPE: TYPE: ACUTE PAIN

## 2019-12-27 ASSESSMENT — PAIN SCALES - GENERAL: PAINLEVEL_OUTOF10: 8

## 2019-12-27 ASSESSMENT — PAIN DESCRIPTION - LOCATION: LOCATION: WRIST

## 2019-12-27 ASSESSMENT — ENCOUNTER SYMPTOMS
SHORTNESS OF BREATH: 0
SORE THROAT: 0

## 2019-12-27 ASSESSMENT — PAIN DESCRIPTION - ORIENTATION: ORIENTATION: LEFT

## 2019-12-31 ENCOUNTER — OFFICE VISIT (OUTPATIENT)
Dept: PRIMARY CARE CLINIC | Age: 54
End: 2019-12-31
Payer: MEDICARE

## 2019-12-31 VITALS
SYSTOLIC BLOOD PRESSURE: 158 MMHG | BODY MASS INDEX: 21.9 KG/M2 | OXYGEN SATURATION: 99 % | DIASTOLIC BLOOD PRESSURE: 89 MMHG | HEART RATE: 61 BPM | WEIGHT: 144 LBS
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
## 2019-12-31 DIAGNOSIS — M25.532 WRIST PAIN, ACUTE, LEFT: Primary | ICD-10-CM

## 2019-12-31 PROCEDURE — G8484 FLU IMMUNIZE NO ADMIN: HCPCS | Performed by: NURSE PRACTITIONER

## 2019-12-31 PROCEDURE — 3017F COLORECTAL CA SCREEN DOC REV: CPT | Performed by: NURSE PRACTITIONER

## 2019-12-31 PROCEDURE — 4004F PT TOBACCO SCREEN RCVD TLK: CPT | Performed by: NURSE PRACTITIONER

## 2019-12-31 PROCEDURE — G8427 DOCREV CUR MEDS BY ELIG CLIN: HCPCS | Performed by: NURSE PRACTITIONER

## 2019-12-31 PROCEDURE — 99213 OFFICE O/P EST LOW 20 MIN: CPT | Performed by: NURSE PRACTITIONER

## 2019-12-31 PROCEDURE — G8420 CALC BMI NORM PARAMETERS: HCPCS | Performed by: NURSE PRACTITIONER

## 2019-12-31 RX ORDER — NAPROXEN 500 MG/1
500 TABLET ORAL 2 TIMES DAILY WITH MEALS
Qty: 60 TABLET | Refills: 0 | Status: SHIPPED | OUTPATIENT
Start: 2019-12-31 | End: 2020-01-29 | Stop reason: SDUPTHER

## 2020-01-02 ENCOUNTER — HOSPITAL ENCOUNTER (OUTPATIENT)
Dept: GENERAL RADIOLOGY | Age: 55
Discharge: HOME | End: 2020-01-04
Payer: MEDICARE

## 2020-01-02 ENCOUNTER — HOSPITAL ENCOUNTER (OUTPATIENT)
Age: 55
Discharge: HOME | End: 2020-01-04
Payer: MEDICARE

## 2020-01-02 PROCEDURE — 73110 X-RAY EXAM OF WRIST: CPT

## 2020-01-29 ENCOUNTER — OFFICE VISIT (OUTPATIENT)
Dept: PRIMARY CARE CLINIC | Age: 55
End: 2020-01-29
Payer: MEDICARE

## 2020-01-29 VITALS
WEIGHT: 153 LBS | DIASTOLIC BLOOD PRESSURE: 83 MMHG | HEIGHT: 67 IN | HEART RATE: 76 BPM | BODY MASS INDEX: 24.01 KG/M2 | SYSTOLIC BLOOD PRESSURE: 139 MMHG

## 2020-01-29 PROCEDURE — 4004F PT TOBACCO SCREEN RCVD TLK: CPT | Performed by: NURSE PRACTITIONER

## 2020-01-29 PROCEDURE — G8484 FLU IMMUNIZE NO ADMIN: HCPCS | Performed by: NURSE PRACTITIONER

## 2020-01-29 PROCEDURE — G8420 CALC BMI NORM PARAMETERS: HCPCS | Performed by: NURSE PRACTITIONER

## 2020-01-29 PROCEDURE — G8427 DOCREV CUR MEDS BY ELIG CLIN: HCPCS | Performed by: NURSE PRACTITIONER

## 2020-01-29 PROCEDURE — 3017F COLORECTAL CA SCREEN DOC REV: CPT | Performed by: NURSE PRACTITIONER

## 2020-01-29 PROCEDURE — 99213 OFFICE O/P EST LOW 20 MIN: CPT | Performed by: NURSE PRACTITIONER

## 2020-01-29 RX ORDER — TIZANIDINE 4 MG/1
4 TABLET ORAL NIGHTLY PRN
Qty: 20 TABLET | Refills: 0 | Status: SHIPPED | OUTPATIENT
Start: 2020-01-29 | End: 2020-02-05 | Stop reason: ALTCHOICE

## 2020-01-29 RX ORDER — NAPROXEN 500 MG/1
500 TABLET ORAL 2 TIMES DAILY WITH MEALS
Qty: 60 TABLET | Refills: 0 | Status: SHIPPED | OUTPATIENT
Start: 2020-01-29 | End: 2020-08-03 | Stop reason: SDUPTHER

## 2020-01-29 RX ORDER — KETOROLAC TROMETHAMINE 30 MG/ML
60 INJECTION, SOLUTION INTRAMUSCULAR; INTRAVENOUS ONCE
Status: COMPLETED | OUTPATIENT
Start: 2020-01-29 | End: 2020-01-29

## 2020-01-29 RX ADMIN — KETOROLAC TROMETHAMINE 60 MG: 30 INJECTION, SOLUTION INTRAMUSCULAR; INTRAVENOUS at 18:19

## 2020-01-29 ASSESSMENT — ENCOUNTER SYMPTOMS
ABDOMINAL PAIN: 0
SHORTNESS OF BREATH: 0
BACK PAIN: 0
VOMITING: 0
COUGH: 0
DIARRHEA: 0
SINUS PAIN: 0
SORE THROAT: 0
NAUSEA: 0

## 2020-01-29 ASSESSMENT — PATIENT HEALTH QUESTIONNAIRE - PHQ9
1. LITTLE INTEREST OR PLEASURE IN DOING THINGS: 0
SUM OF ALL RESPONSES TO PHQ QUESTIONS 1-9: 0
2. FEELING DOWN, DEPRESSED OR HOPELESS: 0
SUM OF ALL RESPONSES TO PHQ QUESTIONS 1-9: 0
SUM OF ALL RESPONSES TO PHQ9 QUESTIONS 1 & 2: 0

## 2020-01-29 NOTE — PROGRESS NOTES
(PROMETRIUM) 100 MG capsule Take 1 capsule by mouth daily 30 capsule 3    estradiol (ESTRACE) 0.5 MG tablet Take 1 tablet by mouth daily 30 tablet 3    Misc. Devices MISC Bilateral wrist brace 1 Device 0    Thumb Spica MISC by Does not apply route Wear daily on left and right hand 2 each 0    Lactobacillus (PROBIOTIC ACIDOPHILUS) TABS Take 1 tablet by mouth daily 30 tablet 12    menthol-methyl salicylate (ICY HOT) 02-05 % external cream Apply topically 3 times daily as needed for Pain 1 Bottle 1     No current facility-administered medications for this visit. Allergies   Allergen Reactions    Codeine Nausea And Vomiting       Subjective:      Review of Systems   Constitutional: Negative for chills and fever. HENT: Negative for ear pain, sinus pain and sore throat. Respiratory: Negative for cough and shortness of breath. Cardiovascular: Negative for chest pain and palpitations. Gastrointestinal: Negative for abdominal pain, diarrhea, nausea and vomiting. Musculoskeletal: Positive for neck pain and neck stiffness. Negative for back pain. Neurological: Negative for dizziness and headaches. All other systems reviewed and are negative. Objective:     Physical Exam  Vitals signs and nursing note reviewed. Constitutional:       Appearance: Normal appearance. Cardiovascular:      Rate and Rhythm: Normal rate. Pulmonary:      Effort: Pulmonary effort is normal.      Breath sounds: Normal breath sounds. Musculoskeletal:      Cervical back: She exhibits tenderness (bilat parspinal tenderness). She exhibits no bony tenderness. Back:       Comments: Pain worsens with lateral rotation of the head, negative axial loading   Skin:     General: Skin is warm and dry. Neurological:      General: No focal deficit present. Mental Status: She is alert and oriented to person, place, and time.        /83 (Site: Right Upper Arm, Position: Sitting, Cuff Size: Medium Adult)   Pulse 76 Ht 5' 7\" (1.702 m)   Wt 153 lb (69.4 kg)   LMP 02/28/2013   BMI 23.96 kg/m²   Lab Review   No visits with results within 2 Month(s) from this visit. Latest known visit with results is:   Office Visit on 11/04/2019   Component Date Value    Strep A Ag 11/04/2019 None Detected        Assessment:       Diagnosis Orders   1. Strain of neck muscle, initial encounter  CT CERVICAL SPINE WO CONTRAST    naproxen (NAPROSYN) 500 MG tablet    ketorolac (TORADOL) injection 60 mg    tiZANidine (ZANAFLEX) 4 MG tablet   2. Strain of right trapezius muscle, initial encounter  CT CERVICAL SPINE WO CONTRAST    naproxen (NAPROSYN) 500 MG tablet    ketorolac (TORADOL) injection 60 mg    tiZANidine (ZANAFLEX) 4 MG tablet       Plan:      Return if symptoms worsen or fail to improve. Orders Placed This Encounter   Medications    naproxen (NAPROSYN) 500 MG tablet     Sig: Take 1 tablet by mouth 2 times daily (with meals)     Dispense:  60 tablet     Refill:  0    ketorolac (TORADOL) injection 60 mg    tiZANidine (ZANAFLEX) 4 MG tablet     Sig: Take 1 tablet by mouth nightly as needed (neck pain)     Dispense:  20 tablet     Refill:  0     Administrations This Visit     ketorolac (TORADOL) injection 60 mg     Admin Date  01/29/2020 Action  Given Dose  60 mg Route  Intramuscular Administered By  Darryl Mcintyre MA              Will treat with muscle relaxant, naproxen. Discussed naproxen dose/duration. Discussed Zanaflex dose/duration, no driving with this medication. Order for CT scan of the cervical spine provided. Return with any new or worsening symptoms, follow-up with PCP. Patient given educational materials - see patient instructions. Discussed use, benefit, and side effects of prescribed medications. All patientquestions answered. Pt voiced understanding. This note was transcribed using dictation with Dragon services. Efforts were made to correct any errors but some words may be misinterpreted.

## 2020-01-29 NOTE — PATIENT INSTRUCTIONS
rub the area to relieve pain and help with blood flow. Do not massage the area if it hurts to do so. · Do not do anything that makes the pain worse. Take it easy for a couple of days. You can do your usual activities if they do not hurt your neck or put it at risk for more stress or injury. · Try sleeping on a special neck pillow. Place it under your neck, not under your head. Placing a tightly rolled-up towel under your neck while you sleep will also work. If you use a neck pillow or rolled towel, do not use your regular pillow at the same time. · To prevent future neck pain, do exercises to stretch and strengthen your neck and back. Learn how to use good posture, safe lifting techniques, and proper body mechanics. When should you call for help? Call 911 anytime you think you may need emergency care. For example, call if:    · You are unable to move an arm or a leg at all.   [facility] your doctor now or seek immediate medical care if:    · You have new or worse symptoms in your arms, legs, chest, belly, or buttocks. Symptoms may include:  ? Numbness or tingling. ? Weakness. ? Pain.     · You lose bladder or bowel control.    Watch closely for changes in your health, and be sure to contact your doctor if:    · You are not getting better as expected. Where can you learn more? Go to [email or web address] [email or web address] and sign in to your [employer] account. Enter M253 in the [facility] box to learn more about \"Neck Strain: Care Instructions. \"     If you do not have an account, please click on the \"Sign Up Now\" link. Current as of: June 26, 2019  Content Version: 12.3  © 3304-2296 [employer]wise, [employer]. Care instructions adapted under license by [facility] ([facility]). If you have questions about a medical condition or this instruction, always ask your healthcare professional. [address] any warranty or liability for your use of this information.

## 2020-01-31 ENCOUNTER — HOSPITAL ENCOUNTER (OUTPATIENT)
Dept: CT IMAGING | Age: 55
Discharge: HOME | End: 2020-02-02
Payer: MEDICARE

## 2020-01-31 PROCEDURE — 72125 CT NECK SPINE W/O DYE: CPT

## 2020-02-05 ENCOUNTER — OFFICE VISIT (OUTPATIENT)
Dept: PRIMARY CARE CLINIC | Age: 55
End: 2020-02-05
Payer: MEDICARE

## 2020-02-05 VITALS
OXYGEN SATURATION: 99 % | TEMPERATURE: 97.3 F | HEART RATE: 68 BPM | WEIGHT: 151.6 LBS | BODY MASS INDEX: 23.74 KG/M2 | DIASTOLIC BLOOD PRESSURE: 90 MMHG | SYSTOLIC BLOOD PRESSURE: 153 MMHG

## 2020-02-05 PROBLEM — I10 ESSENTIAL HYPERTENSION: Status: ACTIVE | Noted: 2020-02-05

## 2020-02-05 PROCEDURE — 4004F PT TOBACCO SCREEN RCVD TLK: CPT | Performed by: NURSE PRACTITIONER

## 2020-02-05 PROCEDURE — G8484 FLU IMMUNIZE NO ADMIN: HCPCS | Performed by: NURSE PRACTITIONER

## 2020-02-05 PROCEDURE — G8427 DOCREV CUR MEDS BY ELIG CLIN: HCPCS | Performed by: NURSE PRACTITIONER

## 2020-02-05 PROCEDURE — 99213 OFFICE O/P EST LOW 20 MIN: CPT | Performed by: NURSE PRACTITIONER

## 2020-02-05 PROCEDURE — 3017F COLORECTAL CA SCREEN DOC REV: CPT | Performed by: NURSE PRACTITIONER

## 2020-02-05 PROCEDURE — G8420 CALC BMI NORM PARAMETERS: HCPCS | Performed by: NURSE PRACTITIONER

## 2020-02-05 RX ORDER — CYCLOBENZAPRINE HCL 10 MG
10 TABLET ORAL NIGHTLY PRN
Qty: 30 TABLET | Refills: 0 | Status: SHIPPED | OUTPATIENT
Start: 2020-02-05 | End: 2020-02-15

## 2020-02-05 ASSESSMENT — ENCOUNTER SYMPTOMS
COUGH: 0
SINUS PAIN: 0
SORE THROAT: 0
VOMITING: 0
NAUSEA: 0
ABDOMINAL PAIN: 0
SHORTNESS OF BREATH: 0
DIARRHEA: 0
BACK PAIN: 0

## 2020-02-05 NOTE — PROGRESS NOTES
cancer screen  07/17/2021    Lipid screen  02/18/2024    HIV screen  Completed    Hepatitis A vaccine  Aged Out    Hepatitis B vaccine  Aged Out    Hib vaccine  Aged Out    Meningococcal (ACWY) vaccine  Aged Out

## 2020-02-05 NOTE — PROGRESS NOTES
Gastrointestinal: Negative for abdominal pain, diarrhea, nausea and vomiting. Musculoskeletal: Positive for neck pain and neck stiffness. Negative for back pain. Neurological: Negative for dizziness and headaches. All other systems reviewed and are negative. Prior to Visit Medications    Medication Sig Taking? Authorizing Provider   cyclobenzaprine (FLEXERIL) 10 MG tablet Take 1 tablet by mouth nightly as needed for Muscle spasms Yes CHARLINE Hedrick CNP   naproxen (NAPROSYN) 500 MG tablet Take 1 tablet by mouth 2 times daily (with meals) Yes CHARLINE Hinson CNP   ibuprofen (IBU) 400 MG tablet Take 1 tablet by mouth every 6 hours as needed for Pain Yes Sri Agustin,    enalapril (VASOTEC) 10 MG tablet Take 1 tablet by mouth daily Yes CHARLINE Hedrick CNP   benzonatate (TESSALON PERLES) 100 MG capsule Take 1 capsule by mouth 3 times daily as needed for Cough Yes CHARLINE Hedrick CNP   progesterone (PROMETRIUM) 100 MG capsule Take 1 capsule by mouth daily Yes CHARLINE Hedrick - CNP   estradiol (ESTRACE) 0.5 MG tablet Take 1 tablet by mouth daily Yes CHARLINE Hedrick - CNP   Misc. Devices MISC Bilateral wrist brace Yes Gricel Mings DO   Thumb Spica MISC by Does not apply route Wear daily on left and right hand Yes CHARLINE Hedrick CNP        Social History     Tobacco Use    Smoking status: Current Every Day Smoker     Packs/day: 1.00     Years: 0.00     Pack years: 0.00     Types: Cigarettes    Smokeless tobacco: Never Used   Substance Use Topics    Alcohol use: Yes     Comment: social        Vitals:    02/05/20 1028 02/05/20 1036   BP: (!) 160/92 (!) 153/90   Pulse: 72 68   Temp: 97.3 °F (36.3 °C)    TempSrc: Oral    SpO2: 98% 99%   Weight: 151 lb 9.6 oz (68.8 kg)      Estimated body mass index is 23.74 kg/m² as calculated from the following:    Height as of 1/29/20: 5' 7\" (1.702 m).     Weight as of this encounter: 151 lb 9.6 oz (68.8 kg).    DIAGNOSTIC FINDINGS:  CBC:  Lab Results   Component Value Date    WBC 7.3 07/10/2016    HGB 14.1 07/10/2016     07/10/2016     04/28/2012       BMP:    Lab Results   Component Value Date     02/18/2019    K 3.8 02/18/2019     02/18/2019    CO2 28 02/18/2019    BUN 17 02/18/2019    CREATININE 0.69 02/18/2019    GLUCOSE 88 02/18/2019    GLUCOSE 87 04/28/2012       HEMOGLOBIN A1C: No results found for: LABA1C    FASTING LIPID PANEL:  Lab Results   Component Value Date    CHOL 185 04/28/2012     02/18/2019    TRIG 44 04/28/2012     Ct Cervical Spine Wo Contrast    Result Date: 1/31/2020  EXAMINATION: CT OF THE CERVICAL SPINE WITHOUT CONTRAST 1/31/2020 2:26 pm TECHNIQUE: CT of the cervical spine was performed without the administration of intravenous contrast. Multiplanar reformatted images are provided for review. Dose modulation, iterative reconstruction, and/or weight based adjustment of the mA/kV was utilized to reduce the radiation dose to as low as reasonably achievable. COMPARISON: None. HISTORY: ORDERING SYSTEM PROVIDED HISTORY: Strain of neck muscle, initial encounter TECHNOLOGIST PROVIDED HISTORY: neck pain Is the patient pregnant?->No Reason for Exam: patient complains of bilateral shoulder pain and right side neck  pain with inability to turn her head right x two weeks Acuity: Unknown Type of Exam: Unknown FINDINGS: BONES/ALIGNMENT: There is no acute fracture or traumatic malalignment. DEGENERATIVE CHANGES: Generalized straightening of the normal cervical lordosis. Multilevel degenerative endplate spurring of the cervical spine. SOFT TISSUES: There is no prevertebral soft tissue swelling. No acute abnormality of the cervical spine. Physical Exam  Constitutional:       Appearance: Normal appearance. She is not ill-appearing. Neck:      Musculoskeletal: Neck supple. Muscular tenderness present. No neck rigidity.    Cardiovascular:      Pulses: compliance addressed. All patient questions answered. Pt  verbalized understanding of all instructions given. · Patient given educational materials - see patient instructions      · Patient advised to contact scheduling offices for any referrals or imaging orders  placed today if they have not been contacted in 48 hours. Return in about 6 weeks (around 3/18/2020) for HTN, Pain. An electronic signature was used to authenticate this note.     --CHARLINE Armando - CNP on 2/5/2020 at 1:14 PM

## 2020-02-26 ENCOUNTER — OFFICE VISIT (OUTPATIENT)
Dept: OBGYN CLINIC | Age: 55
End: 2020-02-26
Payer: MEDICARE

## 2020-02-26 ENCOUNTER — HOSPITAL ENCOUNTER (OUTPATIENT)
Age: 55
Setting detail: SPECIMEN
Discharge: HOME | End: 2020-02-26
Payer: MEDICARE

## 2020-02-26 VITALS
SYSTOLIC BLOOD PRESSURE: 154 MMHG | RESPIRATION RATE: 16 BRPM | WEIGHT: 155 LBS | HEART RATE: 71 BPM | BODY MASS INDEX: 23.49 KG/M2 | DIASTOLIC BLOOD PRESSURE: 87 MMHG | HEIGHT: 68 IN

## 2020-02-26 PROCEDURE — G8427 DOCREV CUR MEDS BY ELIG CLIN: HCPCS | Performed by: CLINICAL NURSE SPECIALIST

## 2020-02-26 PROCEDURE — 4004F PT TOBACCO SCREEN RCVD TLK: CPT | Performed by: CLINICAL NURSE SPECIALIST

## 2020-02-26 PROCEDURE — G8484 FLU IMMUNIZE NO ADMIN: HCPCS | Performed by: CLINICAL NURSE SPECIALIST

## 2020-02-26 PROCEDURE — G8420 CALC BMI NORM PARAMETERS: HCPCS | Performed by: CLINICAL NURSE SPECIALIST

## 2020-02-26 PROCEDURE — 99213 OFFICE O/P EST LOW 20 MIN: CPT | Performed by: CLINICAL NURSE SPECIALIST

## 2020-02-26 PROCEDURE — 3017F COLORECTAL CA SCREEN DOC REV: CPT | Performed by: CLINICAL NURSE SPECIALIST

## 2020-02-26 RX ORDER — CLINDAMYCIN HYDROCHLORIDE 300 MG/1
300 CAPSULE ORAL 2 TIMES DAILY
Qty: 14 CAPSULE | Refills: 0 | Status: SHIPPED | OUTPATIENT
Start: 2020-02-26 | End: 2020-03-30 | Stop reason: SDUPTHER

## 2020-02-26 RX ORDER — FLUCONAZOLE 100 MG/1
100 TABLET ORAL DAILY
Qty: 7 TABLET | Refills: 0 | Status: SHIPPED | OUTPATIENT
Start: 2020-02-26 | End: 2020-03-04

## 2020-02-26 ASSESSMENT — ENCOUNTER SYMPTOMS
EYES NEGATIVE: 1
GASTROINTESTINAL NEGATIVE: 1
RESPIRATORY NEGATIVE: 1
ALLERGIC/IMMUNOLOGIC NEGATIVE: 1

## 2020-02-26 NOTE — PROGRESS NOTES
Subjective:      Patient ID:  Susan Andino is a 49 y.o. female who presents for   Chief Complaint   Patient presents with    Vaginal Discharge     patient is here today for vaginal discharge and itching. this has been going on since last week. says it is thick and cottage cheese like. HPI   Patient is a 50 yo female who presents for vaginal discharge with itching for the past 1 week. Patient reports that her pharmacy changed the flagyl brand and since then she has had watery white discharge and no it is very thick chunky and she felt forced to douch. Review of Systems   Constitutional: Negative for chills and fever. HENT: Negative. Eyes: Negative. Respiratory: Negative. Cardiovascular: Negative. Gastrointestinal: Negative. Endocrine: Negative. Genitourinary: Positive for vaginal discharge (described as white and chunky). Negative for dysuria and menstrual problem. Musculoskeletal: Negative. Skin: Negative. Allergic/Immunologic: Negative. Neurological: Negative. Hematological: Negative. Psychiatric/Behavioral: Negative. BP (!) 154/87 (Site: Left Upper Arm, Position: Sitting, Cuff Size: Medium Adult)   Pulse 71   Resp 16   Ht 5' 8\" (1.727 m)   Wt 155 lb (70.3 kg)   LMP 02/28/2013   BMI 23.57 kg/m²    Patient's last menstrual period was 02/28/2013. Objective:   Physical Exam  Vitals signs reviewed. Constitutional:       Appearance: She is well-developed. HENT:      Head: Normocephalic and atraumatic. Eyes:      Conjunctiva/sclera: Conjunctivae normal.   Neck:      Musculoskeletal: Normal range of motion and neck supple. Cardiovascular:      Rate and Rhythm: Normal rate and regular rhythm. Pulmonary:      Effort: Pulmonary effort is normal.      Breath sounds: Normal breath sounds. Abdominal:      General: Bowel sounds are normal.   Genitourinary:     Vagina: Vaginal discharge (large amount of thin green discharge) present.    Musculoskeletal:

## 2020-02-27 LAB
C TRACH DNA GENITAL QL NAA+PROBE: NEGATIVE
DIRECT EXAM: ABNORMAL
Lab: ABNORMAL
N. GONORRHOEAE DNA: NEGATIVE
SPECIMEN DESCRIPTION: ABNORMAL
SPECIMEN DESCRIPTION: NORMAL

## 2020-03-02 ENCOUNTER — OFFICE VISIT (OUTPATIENT)
Dept: PRIMARY CARE CLINIC | Age: 55
End: 2020-03-02
Payer: MEDICARE

## 2020-03-02 VITALS
BODY MASS INDEX: 24.02 KG/M2 | SYSTOLIC BLOOD PRESSURE: 167 MMHG | WEIGHT: 158 LBS | HEART RATE: 57 BPM | DIASTOLIC BLOOD PRESSURE: 87 MMHG | OXYGEN SATURATION: 100 %

## 2020-03-02 PROCEDURE — G8420 CALC BMI NORM PARAMETERS: HCPCS | Performed by: INTERNAL MEDICINE

## 2020-03-02 PROCEDURE — 99213 OFFICE O/P EST LOW 20 MIN: CPT | Performed by: INTERNAL MEDICINE

## 2020-03-02 PROCEDURE — G8484 FLU IMMUNIZE NO ADMIN: HCPCS | Performed by: INTERNAL MEDICINE

## 2020-03-02 PROCEDURE — 3017F COLORECTAL CA SCREEN DOC REV: CPT | Performed by: INTERNAL MEDICINE

## 2020-03-02 PROCEDURE — 4004F PT TOBACCO SCREEN RCVD TLK: CPT | Performed by: INTERNAL MEDICINE

## 2020-03-02 PROCEDURE — G8427 DOCREV CUR MEDS BY ELIG CLIN: HCPCS | Performed by: INTERNAL MEDICINE

## 2020-03-02 RX ORDER — PREDNISONE 10 MG/1
10 TABLET ORAL DAILY
Qty: 10 TABLET | Refills: 0 | Status: SHIPPED | OUTPATIENT
Start: 2020-03-02 | End: 2020-03-12

## 2020-03-02 RX ORDER — BENZONATATE 100 MG/1
100 CAPSULE ORAL 3 TIMES DAILY PRN
Qty: 10 CAPSULE | Refills: 0 | Status: SHIPPED | OUTPATIENT
Start: 2020-03-02 | End: 2020-05-11 | Stop reason: SDUPTHER

## 2020-03-02 NOTE — PROGRESS NOTES
[address] [facility]  [facility] [address]  Dept: [phone]  Dept Fax: [phone]    Gina Robbins is a 49 y.o. female who presents to the urgent care today for her medicalconditions/complaints as noted below. Kandace PICKARD Minor is c/o of Shoulder Pain (RT shoulder )      HPI:     Shoulder Pain    The pain is present in the left arm and right arm. This is a recurrent problem. The current episode started 1 to 4 weeks ago. The problem has been waxing and waning (comes and goes). The quality of the pain is described as burning (like shock waves going through my arms tinging in fingers except thumbs). The pain is moderate. She has tried NSAIDS for the symptoms. The treatment provided no relief. Past Medical History:   Diagnosis Date    Chronic back pain     Essential hypertension 2/5/2020    Fibroid uterus     Post-menopausal bleeding     UTI (urinary tract infection)         Current Outpatient Medications   Medication Sig Dispense Refill    predniSONE (DELTASONE) 10 MG tablet Take 1 tablet by mouth daily for 10 days 10 tablet 0    clindamycin (CLEOCIN) 300 MG capsule Take 1 capsule by mouth 2 times daily for 7 days 14 capsule 0    fluconazole (DIFLUCAN) 100 MG tablet Take 1 tablet by mouth daily for 7 days 7 tablet 0    naproxen (NAPROSYN) 500 MG tablet Take 1 tablet by mouth 2 times daily (with meals) 60 tablet 0    enalapril (VASOTEC) 10 MG tablet Take 1 tablet by mouth daily 30 tablet 5    progesterone (PROMETRIUM) 100 MG capsule Take 1 capsule by mouth daily 30 capsule 3    estradiol (ESTRACE) 0.5 MG tablet Take 1 tablet by mouth daily 30 tablet 3    ibuprofen (IBU) 400 MG tablet Take 1 tablet by mouth every 6 hours as needed for Pain (Patient not taking: Reported on 3/2/2020) 30 tablet 0    Misc.  Devices MISC Bilateral wrist brace (Patient not taking: Reported on 3/2/2020) 1 Device 0    Thumb Spica MISC by Does not apply

## 2020-03-30 RX ORDER — CLINDAMYCIN HYDROCHLORIDE 300 MG/1
300 CAPSULE ORAL 2 TIMES DAILY
Qty: 14 CAPSULE | Refills: 0 | Status: SHIPPED | OUTPATIENT
Start: 2020-03-30 | End: 2020-05-28 | Stop reason: SDUPTHER

## 2020-03-30 NOTE — TELEPHONE ENCOUNTER
Please Approve or Refuse.   Send to Pharmacy per Pt's Request:      Next Visit Date:  Visit date not found   Last Visit Date: 2/26/2020    No results found for: LABA1C          ( goal A1C is < 7)   BP Readings from Last 3 Encounters:   03/02/20 (!) 167/87   02/26/20 (!) 154/87   02/05/20 (!) 153/90          (goal 120/80)  BUN   Date Value Ref Range Status   02/18/2019 17 6 - 20 mg/dL Final     CREATININE   Date Value Ref Range Status   02/18/2019 0.69 0.50 - 0.90 mg/dL Final     Potassium   Date Value Ref Range Status   02/18/2019 3.8 3.7 - 5.3 mmol/L Final

## 2020-04-23 RX ORDER — ESTRADIOL 0.5 MG/1
0.5 TABLET ORAL DAILY
Qty: 30 TABLET | Refills: 3 | Status: SHIPPED | OUTPATIENT
Start: 2020-04-23 | End: 2021-04-14 | Stop reason: ALTCHOICE

## 2020-04-23 NOTE — TELEPHONE ENCOUNTER
Health Maintenance   Topic Date Due    Potassium monitoring  02/18/2020    Creatinine monitoring  02/18/2020    Colon cancer screen colonoscopy  03/27/2020    DTaP/Tdap/Td vaccine (1 - Tdap) 09/30/2020 (Originally 4/5/1985)    Flu vaccine (Season Ended) 09/30/2020 (Originally 9/1/2020)    Shingles Vaccine (1 of 2) 09/30/2020 (Originally 4/5/2016)    Pneumococcal 0-64 years Vaccine (1 of 1 - PPSV23) 09/30/2020 (Originally 4/5/1972)    Breast cancer screen  09/04/2020    Cervical cancer screen  07/17/2021    Lipid screen  02/18/2024    HIV screen  Completed    Hepatitis A vaccine  Aged Out    Hepatitis B vaccine  Aged Out    Hib vaccine  Aged Out    Meningococcal (ACWY) vaccine  Aged Out             (applicable per patient's age: Cancer Screenings, Depression Screening, Fall Risk Screening, Immunizations)    LDL Cholesterol (mg/dL)   Date Value   02/18/2019 95     AST (U/L)   Date Value   02/18/2019 19     ALT (U/L)   Date Value   02/18/2019 10     BUN (mg/dL)   Date Value   02/18/2019 17      (goal A1C is < 7)   (goal LDL is <100) need 30-50% reduction from baseline     BP Readings from Last 3 Encounters:   03/02/20 (!) 167/87   02/26/20 (!) 154/87   02/05/20 (!) 153/90    (goal /80)      All Future Testing planned in [facility]:  Lab Frequency Next Occurrence   Basic Metabolic Panel Once 67/79/9784   Clostridium Difficile Toxin/Antigen Once 05/13/2020   XR ABDOMEN (2 VIEWS) Once 04/30/2020       Next Visit Date:  Future Appointments   Date Time Provider Kenya Arechiga   5/4/2020  1:45 PM Ravin Rodríguez, APRN - CNP [id number]            Patient Active Problem List:     Chronic pain of both knees     Chronic midline low back pain without sciatica     Serrated adenoma of colon     Essential hypertension

## 2020-05-04 ENCOUNTER — VIRTUAL VISIT (OUTPATIENT)
Dept: INTERNAL MEDICINE | Age: 55
End: 2020-05-04
Payer: MEDICARE

## 2020-05-04 VITALS
HEART RATE: 77 BPM | WEIGHT: 171 LBS | SYSTOLIC BLOOD PRESSURE: 176 MMHG | BODY MASS INDEX: 26 KG/M2 | DIASTOLIC BLOOD PRESSURE: 87 MMHG

## 2020-05-04 PROCEDURE — 4004F PT TOBACCO SCREEN RCVD TLK: CPT | Performed by: NURSE PRACTITIONER

## 2020-05-04 PROCEDURE — 99213 OFFICE O/P EST LOW 20 MIN: CPT | Performed by: NURSE PRACTITIONER

## 2020-05-04 PROCEDURE — G8419 CALC BMI OUT NRM PARAM NOF/U: HCPCS | Performed by: NURSE PRACTITIONER

## 2020-05-04 PROCEDURE — G8427 DOCREV CUR MEDS BY ELIG CLIN: HCPCS | Performed by: NURSE PRACTITIONER

## 2020-05-04 PROCEDURE — 3017F COLORECTAL CA SCREEN DOC REV: CPT | Performed by: NURSE PRACTITIONER

## 2020-05-04 RX ORDER — ENALAPRIL MALEATE 20 MG/1
20 TABLET ORAL DAILY
Qty: 90 TABLET | Refills: 0 | Status: SHIPPED | OUTPATIENT
Start: 2020-05-04 | End: 2020-09-01

## 2020-05-04 ASSESSMENT — ENCOUNTER SYMPTOMS
DIARRHEA: 0
SHORTNESS OF BREATH: 0
BLURRED VISION: 0
RHINORRHEA: 0
BACK PAIN: 0
ORTHOPNEA: 0
VOMITING: 0
NAUSEA: 0
COUGH: 0
ABDOMINAL PAIN: 0
SORE THROAT: 0
ABDOMINAL DISTENTION: 0
BLOOD IN STOOL: 0
CHOKING: 0
WHEEZING: 0
TROUBLE SWALLOWING: 0
SINUS PAIN: 0

## 2020-05-04 NOTE — PATIENT INSTRUCTIONS
your other hand, hold your injured arm (palm outward) behind your back by the wrist. Pull your arm up gently to stretch your shoulder. 3. Advanced stretch: Put a towel over your other shoulder. Put the hand of your injured arm behind your back. Now hold the back end of the towel. With the other hand, hold the front end of the towel in front of your body. Pull gently on the front end of the towel. This will bring your hand farther up your back to stretch your shoulder. Overhead stretch   1. Standing about an arm's length away, grasp onto a solid surface. You could use a countertop, a doorknob, or the back of a sturdy chair. 2. With your knees slightly bent, bend forward with your arms straight. Lower your upper body, and let your shoulders stretch. 3. As your shoulders are able to stretch farther, you may need to take a step or two backward. 4. Hold for at least 15 to 30 seconds. Then stand up and relax. If you had stepped back during your stretch, step forward so you can keep your hands on the solid surface. 5. Repeat 2 to 4 times. Shoulder flexion (lying down)   To make a wand for this exercise, use a piece of PVC pipe or a broom handle with the broom removed. Make the wand about a foot wider than your shoulders. 1. Lie on your back, holding a wand with both hands. Your palms should face down as you hold the wand. 2. Keeping your elbows straight, slowly raise your arms over your head. Raise them until you feel a stretch in your shoulders, upper back, and chest.  3. Hold for 15 to 30 seconds. 4. Repeat 2 to 4 times. Shoulder rotation (lying down)   To make a wand for this exercise, use a piece of PVC pipe or a broom handle with the broom removed. Make the wand about a foot wider than your shoulders. 1. Lie on your back. Hold a wand with both hands with your elbows bent and palms up. 2. Keep your elbows close to your body, and move the wand across your body toward the sore arm.   3. Hold for 8 to 12 counter. You can then slowly progress to the end of a couch, then to a sturdy chair, and finally to the floor. Scapular exercise: Retraction   For this exercise, you will need elastic exercise material, such as surgical tubing or Thera-Band. 1. Put the band around a solid object at about waist level. (A bedpost will work well.) Each hand should hold an end of the band. 2. With your elbows at your sides and bent to 90 degrees, pull the band back. Your shoulder blades should move toward each other. Then move your arms back where you started. 3. Repeat 8 to 12 times. 4. If you have good range of motion in your shoulders, try this exercise with your arms lifted out to the sides. Keep your elbows at a 90-degree angle. Raise the elastic band up to about shoulder level. Pull the band back to move your shoulder blades toward each other. Then move your arms back where you started. Internal rotator strengthening exercise   1. Start by tying a piece of elastic exercise material to a doorknob. You can use surgical tubing or Thera-Band. 2. Stand or sit with your shoulder relaxed and your elbow bent 90 degrees. Your upper arm should rest comfortably against your side. Squeeze a rolled towel between your elbow and your body for comfort. This will help keep your arm at your side. 3. Hold one end of the elastic band in the hand of the painful arm. 4. Slowly rotate your forearm toward your body until it touches your belly. Slowly move it back to where you started. 5. Keep your elbow and upper arm firmly tucked against the towel roll or at your side. 6. Repeat 8 to 12 times. External rotator strengthening exercise   1. Start by tying a piece of elastic exercise material to a doorknob. You can use surgical tubing or Thera-Band. (You may also hold one end of the band in each hand.)  2. Stand or sit with your shoulder relaxed and your elbow bent 90 degrees. Your upper arm should rest comfortably against your side. Squeeze a rolled towel between your elbow and your body for comfort. This will help keep your arm at your side. 3. Hold one end of the elastic band with the hand of the painful arm. 4. Start with your forearm across your belly. Slowly rotate the forearm out away from your body. Keep your elbow and upper arm tucked against the towel roll or the side of your body until you begin to feel tightness in your shoulder. Slowly move your arm back to where you started. 5. Repeat 8 to 12 times. Follow-up care is a key part of your treatment and safety. Be sure to make and go to all appointments, and call your doctor if you are having problems. It's also a good idea to know your test results and keep a list of the medicines you take. Where can you learn more? Go to https://chramyeb.[employer]. org and sign in to your [employer] account. Enter Rolland Krabbe in the [employer] the Bet box to learn more about \"Rotator Cuff: Exercises. \"     If you do not have an account, please click on the \"Sign Up Now\" link. Current as of: June 26, 2019Content Version: 12.4  © 4207-7218 Healthwise, Incorporated. Care instructions adapted under license by [facility] ([facility]). If you have questions about a medical condition or this instruction, always ask your healthcare professional. [address] any warranty or liability for your use of this information.

## 2020-05-11 RX ORDER — BENZONATATE 100 MG/1
100 CAPSULE ORAL 3 TIMES DAILY PRN
Qty: 10 CAPSULE | Refills: 0 | Status: SHIPPED | OUTPATIENT
Start: 2020-05-11 | End: 2020-06-01 | Stop reason: ALTCHOICE

## 2020-05-11 NOTE — TELEPHONE ENCOUNTER
Health Maintenance   Topic Date Due    Potassium monitoring  02/18/2020    Creatinine monitoring  02/18/2020    Colon cancer screen colonoscopy  03/27/2020    DTaP/Tdap/Td vaccine (1 - Tdap) 09/30/2020 (Originally 4/5/1985)    Flu vaccine (Season Ended) 09/30/2020 (Originally 9/1/2020)    Shingles Vaccine (1 of 2) 09/30/2020 (Originally 4/5/2016)    Pneumococcal 0-64 years Vaccine (1 of 1 - PPSV23) 09/30/2020 (Originally 4/5/1972)    Breast cancer screen  09/04/2020    Cervical cancer screen  07/17/2021    Lipid screen  02/18/2024    HIV screen  Completed    Hepatitis A vaccine  Aged Out    Hepatitis B vaccine  Aged Out    Hib vaccine  Aged Out    Meningococcal (ACWY) vaccine  Aged Out             (applicable per patient's age: Cancer Screenings, Depression Screening, Fall Risk Screening, Immunizations)    LDL Cholesterol (mg/dL)   Date Value   02/18/2019 95     AST (U/L)   Date Value   02/18/2019 19     ALT (U/L)   Date Value   02/18/2019 10     BUN (mg/dL)   Date Value   02/18/2019 17      (goal A1C is < 7)   (goal LDL is <100) need 30-50% reduction from baseline     BP Readings from Last 3 Encounters:   05/04/20 (!) 176/87   03/02/20 (!) 167/87   02/26/20 (!) 154/87    (goal /80)      All Future Testing planned in [facility]:  Lab Frequency Next Occurrence   Basic Metabolic Panel Once 85/06/8862   Clostridium Difficile Toxin/Antigen Once 05/13/2020   XR ABDOMEN (2 VIEWS) Once 04/30/2020       Next Visit Date:  No future appointments.          Patient Active Problem List:     Chronic pain of both knees     Chronic midline low back pain without sciatica     Serrated adenoma of colon     Essential hypertension

## 2020-05-28 ENCOUNTER — HOSPITAL ENCOUNTER (OUTPATIENT)
Age: 55
Setting detail: SPECIMEN
Discharge: HOME | End: 2020-05-28
Payer: MEDICARE

## 2020-05-28 ENCOUNTER — OFFICE VISIT (OUTPATIENT)
Dept: OBGYN CLINIC | Age: 55
End: 2020-05-28
Payer: MEDICARE

## 2020-05-28 VITALS
BODY MASS INDEX: 25.79 KG/M2 | WEIGHT: 170.2 LBS | TEMPERATURE: 97.2 F | DIASTOLIC BLOOD PRESSURE: 90 MMHG | HEIGHT: 68 IN | HEART RATE: 73 BPM | SYSTOLIC BLOOD PRESSURE: 153 MMHG

## 2020-05-28 LAB
DIRECT EXAM: NORMAL
Lab: NORMAL
SPECIMEN DESCRIPTION: NORMAL

## 2020-05-28 PROCEDURE — 99396 PREV VISIT EST AGE 40-64: CPT | Performed by: CLINICAL NURSE SPECIALIST

## 2020-05-28 RX ORDER — CLINDAMYCIN HYDROCHLORIDE 300 MG/1
300 CAPSULE ORAL 2 TIMES DAILY
Qty: 14 CAPSULE | Refills: 1 | Status: SHIPPED | OUTPATIENT
Start: 2020-05-28 | End: 2020-06-04

## 2020-05-28 NOTE — PROGRESS NOTES
20 MG tablet Take 1 tablet by mouth daily 90 tablet 0    progesterone (PROMETRIUM) 100 MG capsule TAKE 1 CAPSULE BY MOUTH DAILY 30 capsule 3    estradiol (ESTRACE) 0.5 MG tablet TAKE 1 TABLET BY MOUTH DAILY 30 tablet 3    naproxen (NAPROSYN) 500 MG tablet Take 1 tablet by mouth 2 times daily (with meals) 60 tablet 0    ibuprofen (IBU) 400 MG tablet Take 1 tablet by mouth every 6 hours as needed for Pain 30 tablet 0     No current facility-administered medications for this visit. Allergies: Allergies   Allergen Reactions    Codeine Nausea And Vomiting       Gynecologic History:  Patient's last menstrual period was 2013.   Sexually Active: Yes  STD History:No  Birth Control: No    OB History    Para Term  AB Living   6 1 0 0 0 1   SAB TAB Ectopic Molar Multiple Live Births   0 0 0 0 0 0     ______________________________________________________________________    Review of Systems    REVIEW OFSYSTEMS:        Constitutional:  Unexpected weight change increase in weight since corona virus started , extreme fatigue, night sweats              yes  Skin:                           Rashes, moles   no  Neurological:  Frequentheadaches, seizures         no  Ophthalmic:  Recent visual changes no  ENT:   Difficulty swallowing  no  Breast:              Masses, pain, nipple discharge                            no     Respiratory:  Shortness of breath, coughing           no    Cardiovascular: Chest pain   no     Gastrointestinal: Chronic diarrhea/constipation, nausea/vomiting           no   Urogenital:  Urinary incontinence, frequency, urgency          no                                         Heavy/irregular periods           no                                      Vaginal discharge                   no  Hematological: Bruises easy Denies clotting disorder  yes     Endocrine:  Hot flashes tolerable  yes     Hot/Cold Intolerance  no    Psychological:            Mood and affect were within normal

## 2020-05-30 LAB
HPV SAMPLE: NORMAL
HPV, GENOTYPE 16: NOT DETECTED
HPV, GENOTYPE 18: NOT DETECTED
HPV, HIGH RISK OTHER: NOT DETECTED
HPV, INTERPRETATION: NORMAL
SPECIMEN DESCRIPTION: NORMAL

## 2020-06-01 ENCOUNTER — NURSE TRIAGE (OUTPATIENT)
Dept: OTHER | Facility: CLINIC | Age: 55
End: 2020-06-01

## 2020-06-01 ENCOUNTER — VIRTUAL VISIT (OUTPATIENT)
Dept: PRIMARY CARE CLINIC | Age: 55
End: 2020-06-01
Payer: MEDICARE

## 2020-06-01 PROCEDURE — 99213 OFFICE O/P EST LOW 20 MIN: CPT | Performed by: NURSE PRACTITIONER

## 2020-06-01 PROCEDURE — 3017F COLORECTAL CA SCREEN DOC REV: CPT | Performed by: NURSE PRACTITIONER

## 2020-06-01 PROCEDURE — G8419 CALC BMI OUT NRM PARAM NOF/U: HCPCS | Performed by: NURSE PRACTITIONER

## 2020-06-01 PROCEDURE — G8427 DOCREV CUR MEDS BY ELIG CLIN: HCPCS | Performed by: NURSE PRACTITIONER

## 2020-06-01 PROCEDURE — 4004F PT TOBACCO SCREEN RCVD TLK: CPT | Performed by: NURSE PRACTITIONER

## 2020-06-01 RX ORDER — LORATADINE 10 MG
2 CAPSULE ORAL 2 TIMES DAILY
Qty: 2 ML | Refills: 0 | Status: SHIPPED | OUTPATIENT
Start: 2020-06-01 | End: 2020-06-04

## 2020-06-01 RX ORDER — BENZONATATE 100 MG/1
100 CAPSULE ORAL 3 TIMES DAILY PRN
Qty: 30 CAPSULE | Refills: 0 | Status: SHIPPED | OUTPATIENT
Start: 2020-06-01 | End: 2020-06-08

## 2020-06-01 ASSESSMENT — ENCOUNTER SYMPTOMS
EYE DISCHARGE: 0
EYE ITCHING: 0
WHEEZING: 0
SORE THROAT: 1
COUGH: 1
RHINORRHEA: 0
SHORTNESS OF BREATH: 0
CHEST TIGHTNESS: 0
SINUS PRESSURE: 0

## 2020-06-01 NOTE — PROGRESS NOTES
Visit Information    Have you changed or started any medications since your last visit including any over-the-counter medicines, vitamins, or herbal medicines? no   Are you having any side effects from any of your medications? -  no  Have you stopped taking any of your medications? Is so, why? -  no    Have you seen any other physician or provider since your last visit? Yes - Records Requested  Have you had any other diagnostic tests since your last visit? No  Have you been seen in the emergency room and/or had an admission to a hospital since we last saw you? No  Have you had your routine dental cleaning in the past 6 months? no    Have you activated your [employer] work account? If not, what are your barriers?  Yes     Patient Care Team:  CHARLINE Lopez CNP as PCP - General (Family Medicine)  CHARLINE Lopez CNP as PCP - [facility] Provider    Medical History Review  Past Medical, Family, and Social History reviewed and does not contribute to the patient presenting condition    Health Maintenance   Topic Date Due    Potassium monitoring  02/18/2020    Creatinine monitoring  02/18/2020    Colon cancer screen colonoscopy  03/27/2020    DTaP/Tdap/Td vaccine (1 - Tdap) 09/30/2020 (Originally 4/5/1985)    Flu vaccine (Season Ended) 09/30/2020 (Originally 9/1/2020)    Shingles Vaccine (1 of 2) 09/30/2020 (Originally 4/5/2016)    Pneumococcal 0-64 years Vaccine (1 of 1 - PPSV23) 09/30/2020 (Originally 4/5/1972)    Breast cancer screen  09/04/2020    Lipid screen  02/18/2024    Cervical cancer screen  05/28/2025    HIV screen  Completed    Hepatitis A vaccine  Aged Out    Hepatitis B vaccine  Aged Out    Hib vaccine  Aged Out    Meningococcal (ACWY) vaccine  Aged Out

## 2020-06-01 NOTE — TELEPHONE ENCOUNTER
Caller complaining of cough and sore throat x 1 week. Caller describes cough as a dry cough with coughing spells. States she has had a potential exposure to person with COVID-19 symptoms. Denies fever or SOB. Triage recommendation is to discuss with PCP today. Call transferred to scheduling to set up appt. Reason for Disposition   [1] Continuous (nonstop) coughing interferes with work or school AND [2] no improvement using cough treatment per protocol    Answer Assessment - Initial Assessment Questions  1. COVID-19 DIAGNOSIS: \"Who made your Coronavirus (COVID-19) diagnosis? \" \"Was it confirmed by a positive lab test?\" If not diagnosed by a HCP, ask \"Are there lots of cases (community spread) where you live? \" (See public health department website, if unsure)    * MAJOR community spread: high number of cases; numbers of cases are increasing; many people hospitalized. * MINOR community spread: low number of cases; not increasing; few or no people hospitalized      Minor. 2. ONSET: \"When did the COVID-19 symptoms start?\"   1 week ago. 3. WORST SYMPTOM: \"What is your worst symptom? \" (e.g., cough, fever, shortness of breath, muscle aches)     Cough. 4. COUGH: \"How bad is the cough? \"        Dry cough with coughing spells. Caller states she is becoming \"hoarse\". 5. FEVER: \"Do you have a fever? \" If so, ask: \"What is your temperature, how was it measured, and when did it start? \"      Denies. 6. RESPIRATORY STATUS: \"Describe your breathing? \" (e.g., shortness of breath, wheezing, unable to speak)     Denies. 7. BETTER-SAME-WORSE: \"Are you getting better, staying the same or getting worse compared to yesterday? \"  If getting worse, ask, \"In what way? \"      Same. 8. HIGH RISK DISEASE: \"Do you have any chronic medical problems? \" (e.g., asthma, heart or lung disease, weak immune system, etc.)  Denies. 9. PREGNANCY: \"Is there any chance you are pregnant? \" \"When was your last menstrual period? \"  N/A  10.  OTHER

## 2020-06-02 ENCOUNTER — HOSPITAL ENCOUNTER (OUTPATIENT)
Age: 55
Setting detail: SPECIMEN
Discharge: HOME | End: 2020-06-02
Payer: MEDICARE

## 2020-06-03 LAB — CYTOLOGY REPORT: NORMAL

## 2020-06-06 LAB — SARS-COV-2, NAA: NOT DETECTED

## 2020-06-07 ENCOUNTER — TELEPHONE (OUTPATIENT)
Dept: PRIMARY CARE CLINIC | Age: 55
End: 2020-06-07

## 2020-07-08 ENCOUNTER — OFFICE VISIT (OUTPATIENT)
Dept: PRIMARY CARE CLINIC | Age: 55
End: 2020-07-08
Payer: MEDICARE

## 2020-07-08 ENCOUNTER — HOSPITAL ENCOUNTER (OUTPATIENT)
Age: 55
Setting detail: SPECIMEN
Discharge: HOME | End: 2020-07-08
Payer: MEDICARE

## 2020-07-08 VITALS
BODY MASS INDEX: 25.79 KG/M2 | HEIGHT: 68 IN | RESPIRATION RATE: 16 BRPM | SYSTOLIC BLOOD PRESSURE: 160 MMHG | WEIGHT: 170.19 LBS | HEART RATE: 67 BPM | DIASTOLIC BLOOD PRESSURE: 86 MMHG | TEMPERATURE: 98.8 F | OXYGEN SATURATION: 98 %

## 2020-07-08 PROCEDURE — 99213 OFFICE O/P EST LOW 20 MIN: CPT | Performed by: PHYSICIAN ASSISTANT

## 2020-07-08 PROCEDURE — 3017F COLORECTAL CA SCREEN DOC REV: CPT | Performed by: PHYSICIAN ASSISTANT

## 2020-07-08 PROCEDURE — G8427 DOCREV CUR MEDS BY ELIG CLIN: HCPCS | Performed by: PHYSICIAN ASSISTANT

## 2020-07-08 PROCEDURE — 4004F PT TOBACCO SCREEN RCVD TLK: CPT | Performed by: PHYSICIAN ASSISTANT

## 2020-07-08 PROCEDURE — G8419 CALC BMI OUT NRM PARAM NOF/U: HCPCS | Performed by: PHYSICIAN ASSISTANT

## 2020-07-08 ASSESSMENT — ENCOUNTER SYMPTOMS
EYES NEGATIVE: 1
VOMITING: 0
ABDOMINAL PAIN: 0
DIARRHEA: 0
WHEEZING: 0
NAUSEA: 0
COUGH: 1
SORE THROAT: 0
CHEST TIGHTNESS: 0

## 2020-07-08 NOTE — LETTER
[address] [address]  Phone: [phone]  Fax: [phone]    Paige Mitchell        July 8, 2020     Patient: Angela Walsh   YOB: 1965   Date of Visit: 7/8/2020       To Whom it May Concern:    Kandace Walsh was seen in my clinic on 7/8/2020. She is to remain off work until the results of her COVID test comes back negative (2-5 days) and she is symptoms free. If you have any questions or concerns, please don't hesitate to call.     Sincerely,         Siobhan Jack PA-C

## 2020-07-08 NOTE — PROGRESS NOTES
[address] [address]  Phone: [phone]  Fax: Rima De Leon    Pt Name: Rich Butler  MRN: [id number]  Brianne 1965  Date of evaluation: 7/8/2020  Provider: Bonnie Gudino PA-C     CHIEF COMPLAINT       Chief Complaint   Patient presents with    Covid Testing    Fever    Cough           HISTORY OF PRESENT ILLNESS  (Location/Symptom, Timing/Onset, Context/Setting, Quality, Duration, Modifying Factors, Severity.)   Kandace Walsh is a 48 y.o. Black [2] female who presents to the office for evaluation of      Fever    This is a new problem. The current episode started today. The maximum temperature noted was 99 to 99.9 F. Associated symptoms include coughing. Pertinent negatives include no abdominal pain, chest pain, congestion, diarrhea, ear pain, headaches, muscle aches, nausea, rash, sleepiness, sore throat, urinary pain, vomiting or wheezing. Treatments tried: Dayquil. The treatment provided moderate relief. Cough   Associated symptoms include a fever and postnasal drip. Pertinent negatives include no chest pain, chills, ear pain, headaches, rash, sore throat or wheezing. Nursing Notes were reviewed. REVIEW OF SYSTEMS    (2-9 systems for level 4, 10 or more for level 5)     Review of Systems   Constitutional: Positive for fever. Negative for chills, diaphoresis and fatigue. HENT: Positive for postnasal drip. Negative for congestion, ear pain and sore throat. Eyes: Negative. Respiratory: Positive for cough. Negative for chest tightness and wheezing. Cardiovascular: Negative for chest pain. Gastrointestinal: Negative for abdominal pain, diarrhea, nausea and vomiting. Genitourinary: Negative for dysuria. Musculoskeletal: Negative. Skin: Negative for rash. Neurological: Negative for headaches. Except as noted above the remainder of the review of systems was reviewed andnegative.        PAST ; Stay home: People who are mildly ill with COVID-19 are able to isolate at home during their illness. You should restrict activities outside your home, except for getting medical care.   ; Avoid public areas: Do not go to work, school, or public areas.   ; Avoid public transportation: Avoid using public transportation, ride-sharing, or taxis.  ; Separate yourself from other people and animals in your home   ; Stay away from others: As much as possible, you should stay in a specific room and away from other people in your home. Also, you should use a separate bathroom, if available.   ; Limit contact with pets & animals: You should restrict contact with pets and other animals while you are sick with COVID-19, just like you would around other people. Although there have not been reports of pets or other animals becoming sick with COVID-19, it is still recommended that people sick with COVID-19 limit contact with animals until more information is known about the virus. ; When possible, have another member of your household care for your animals while you are sick. If you are sick with COVID-19, avoid contact with your pet, including petting, snuggling, being kissed or licked, and sharing food. If you must care for your pet or be around animals while you are sick, wash your hands before and after you interact with pets and wear a facemask. See COVID-19 and Animals for more information. Other considerations   The ill person should eat/be fed in their room if possible. Non-disposable  items used should be handled with gloves and washed with hot water or in a . Clean hands after handling used  items.  If possible, dedicate a lined trash can for the ill person. Use gloves when removing garbage bags, handling, and disposing of trash. Wash hands after handling or disposing of trash.    Consider consulting with your local health department about trash disposal guidance if available. Information for Household Members and Caregivers of Someone who is Sick   Call ahead before visiting your doctor   Call ahead: If you have a medical appointment, call the healthcare provider and tell them that you have or may have COVID-19. This will help the healthcare provider's office take steps to keep other people from getting infected or exposed. Wear a facemask if you are sick   ; If you are sick: You should wear a facemask when you are around other people (e.g., sharing a room or vehicle) or pets and before you enter a healthcare provider's office. ; If you are caring for others: If the person who is sick is not able to wear a facemask (for example, because it causes trouble breathing), then people who live with the person who is sick should not stay in the same room with them, or they should wear a facemask if they enter a room with the person who is sick. Cover your coughs and sneezes   ; Cover: Cover your mouth and nose with a tissue when you cough or sneeze.   ; Dispose: Throw used tissues in a lined trash can.   ; Wash hands: Immediately wash your hands with soap and water for at least 20 seconds or, if soap and water are not available, clean your hands with an alcohol-based hand  that contains at least 60% alcohol. Clean your hands often   ; Wash hands: Wash your hands often with soap and water for at least 20 seconds, especially after blowing your nose, coughing, or sneezing; going to the bathroom; and before eating or preparing food.   ; Hand : If soap and water are not readily available, use an alcohol-based hand  with at least 60% alcohol, covering all surfaces of your hands and rubbing them together until they feel dry.   ; Soap and water: Soap and water are the best option if hands are visibly dirty.   ; Avoid touching: Avoid touching your eyes, nose, and mouth with unwashed hands. Handwashing Tips   ;  Wet your hands with clean, running water (warm your healthcare provider to call the local or state health department. Persons who are placed under active monitoring or facilitated self-monitoring should follow instructions provided by their local health department or occupational health professionals, as appropriate.  ; Call 911 if you have a medical emergency: If you have a medical emergency and need to call 911, notify the dispatch personnel that you have, or are being evaluated for COVID-19. If possible, put on a facemask before emergency medical services arrive. Patient instructed to return to the office if symptoms worsen, return, or have any other concerns. Patient understands and is agreeable.          Lisa Rowland PA-C 7/8/2020 3:53 PM

## 2020-07-12 LAB — SARS-COV-2, NAA: NOT DETECTED

## 2020-07-13 ENCOUNTER — TELEPHONE (OUTPATIENT)
Dept: PRIMARY CARE CLINIC | Age: 55
End: 2020-07-13

## 2020-08-03 ENCOUNTER — TELEPHONE (OUTPATIENT)
Dept: OBGYN CLINIC | Age: 55
End: 2020-08-03

## 2020-08-03 RX ORDER — NAPROXEN 500 MG/1
500 TABLET ORAL 2 TIMES DAILY WITH MEALS
Qty: 60 TABLET | Refills: 0 | Status: SHIPPED | OUTPATIENT
Start: 2020-08-03 | End: 2021-09-15 | Stop reason: SDUPTHER

## 2020-08-03 RX ORDER — CLINDAMYCIN HYDROCHLORIDE 300 MG/1
300 CAPSULE ORAL 2 TIMES DAILY
Qty: 14 CAPSULE | Refills: 0 | Status: SHIPPED | OUTPATIENT
Start: 2020-08-03 | End: 2021-07-15 | Stop reason: SDUPTHER

## 2020-08-03 NOTE — TELEPHONE ENCOUNTER
Patient called with c/o vaginal discharge and odor and is requesting clindamycin to be sent to the pharmacy

## 2020-09-01 RX ORDER — ENALAPRIL MALEATE 20 MG/1
20 TABLET ORAL DAILY
Qty: 90 TABLET | Refills: 0 | Status: SHIPPED | OUTPATIENT
Start: 2020-09-01 | End: 2021-04-14

## 2020-09-01 NOTE — TELEPHONE ENCOUNTER
Health Maintenance   Topic Date Due    Potassium monitoring  02/18/2020    Creatinine monitoring  02/18/2020    Colon cancer screen colonoscopy  03/27/2020    Flu vaccine (1) 09/01/2020    Breast cancer screen  09/04/2020    DTaP/Tdap/Td vaccine (1 - Tdap) 09/30/2020 (Originally 4/5/1985)    Shingles Vaccine (1 of 2) 09/30/2020 (Originally 4/5/2016)    Pneumococcal 0-64 years Vaccine (1 of 1 - PPSV23) 09/30/2020 (Originally 4/5/1972)    Lipid screen  02/18/2024    Cervical cancer screen  05/28/2025    HIV screen  Completed    Hepatitis A vaccine  Aged Out    Hepatitis B vaccine  Aged Out    Hib vaccine  Aged Out    Meningococcal (ACWY) vaccine  Aged Out             (applicable per patient's age: Cancer Screenings, Depression Screening, Fall Risk Screening, Immunizations)    LDL Cholesterol (mg/dL)   Date Value   02/18/2019 95     AST (U/L)   Date Value   02/18/2019 19     ALT (U/L)   Date Value   02/18/2019 10     BUN (mg/dL)   Date Value   02/18/2019 17      (goal A1C is < 7)   (goal LDL is <100) need 30-50% reduction from baseline     BP Readings from Last 3 Encounters:   07/08/20 (!) 160/86   05/28/20 (!) 153/90   05/04/20 (!) 176/87    (goal /80)      All Future Testing planned in [facility]:  Lab Frequency Next Occurrence   STEPHANIE DIGITAL SCREEN W CAD BILATERAL Once 09/04/2020       Next Visit Date:  No future appointments.          Patient Active Problem List:     Chronic pain of both knees     Chronic midline low back pain without sciatica     Serrated adenoma of colon     Essential hypertension

## 2021-04-13 DIAGNOSIS — I10 ESSENTIAL HYPERTENSION: ICD-10-CM

## 2021-04-13 NOTE — TELEPHONE ENCOUNTER
No results found for: LABA1C          ( goal A1C is < 7)   No results found for: LABMICR  LDL Cholesterol (mg/dL)   Date Value   02/18/2019 95       (goal LDL is <100)   AST (U/L)   Date Value   02/18/2019 19     ALT (U/L)   Date Value   02/18/2019 10     BUN (mg/dL)   Date Value   02/18/2019 17     BP Readings from Last 3 Encounters:   07/08/20 (!) 160/86   05/28/20 (!) 153/90   05/04/20 (!) 176/87          (goal 120/80)        Patient Active Problem List:     Chronic pain of both knees     Chronic midline low back pain without sciatica     Serrated adenoma of colon     Essential hypertension      ----Kimo Muniz

## 2021-04-14 ENCOUNTER — OFFICE VISIT (OUTPATIENT)
Dept: PRIMARY CARE CLINIC | Age: 56
End: 2021-04-14
Payer: MEDICARE

## 2021-04-14 VITALS
DIASTOLIC BLOOD PRESSURE: 78 MMHG | BODY MASS INDEX: 23.88 KG/M2 | WEIGHT: 157 LBS | OXYGEN SATURATION: 100 % | HEART RATE: 75 BPM | SYSTOLIC BLOOD PRESSURE: 132 MMHG | TEMPERATURE: 97.2 F

## 2021-04-14 DIAGNOSIS — I10 ESSENTIAL HYPERTENSION: Primary | ICD-10-CM

## 2021-04-14 DIAGNOSIS — F17.200 CURRENT SMOKER ON SOME DAYS: ICD-10-CM

## 2021-04-14 DIAGNOSIS — Z12.31 BREAST CANCER SCREENING BY MAMMOGRAM: ICD-10-CM

## 2021-04-14 PROCEDURE — 3017F COLORECTAL CA SCREEN DOC REV: CPT | Performed by: NURSE PRACTITIONER

## 2021-04-14 PROCEDURE — 4004F PT TOBACCO SCREEN RCVD TLK: CPT | Performed by: NURSE PRACTITIONER

## 2021-04-14 PROCEDURE — G8427 DOCREV CUR MEDS BY ELIG CLIN: HCPCS | Performed by: NURSE PRACTITIONER

## 2021-04-14 PROCEDURE — G8420 CALC BMI NORM PARAMETERS: HCPCS | Performed by: NURSE PRACTITIONER

## 2021-04-14 PROCEDURE — 99213 OFFICE O/P EST LOW 20 MIN: CPT | Performed by: NURSE PRACTITIONER

## 2021-04-14 RX ORDER — ENALAPRIL MALEATE 20 MG/1
20 TABLET ORAL DAILY
Qty: 90 TABLET | Refills: 0 | Status: SHIPPED | OUTPATIENT
Start: 2021-04-14 | End: 2021-09-21 | Stop reason: SDUPTHER

## 2021-04-14 SDOH — ECONOMIC STABILITY: TRANSPORTATION INSECURITY
IN THE PAST 12 MONTHS, HAS LACK OF TRANSPORTATION KEPT YOU FROM MEETINGS, WORK, OR FROM GETTING THINGS NEEDED FOR DAILY LIVING?: NO

## 2021-04-14 SDOH — ECONOMIC STABILITY: TRANSPORTATION INSECURITY
IN THE PAST 12 MONTHS, HAS THE LACK OF TRANSPORTATION KEPT YOU FROM MEDICAL APPOINTMENTS OR FROM GETTING MEDICATIONS?: NO

## 2021-04-14 SDOH — ECONOMIC STABILITY: INCOME INSECURITY: HOW HARD IS IT FOR YOU TO PAY FOR THE VERY BASICS LIKE FOOD, HOUSING, MEDICAL CARE, AND HEATING?: NOT HARD AT ALL

## 2021-04-14 ASSESSMENT — ENCOUNTER SYMPTOMS
SORE THROAT: 0
TROUBLE SWALLOWING: 0
ABDOMINAL DISTENTION: 0
ABDOMINAL PAIN: 0
BLOOD IN STOOL: 0
DIARRHEA: 0
VOMITING: 0
COUGH: 0
NAUSEA: 0
BACK PAIN: 0
ORTHOPNEA: 0
SINUS PAIN: 0
WHEEZING: 0
SHORTNESS OF BREATH: 0
BLURRED VISION: 0
RHINORRHEA: 0
CHOKING: 0

## 2021-04-14 ASSESSMENT — PATIENT HEALTH QUESTIONNAIRE - PHQ9
SUM OF ALL RESPONSES TO PHQ QUESTIONS 1-9: 0
2. FEELING DOWN, DEPRESSED OR HOPELESS: 0
SUM OF ALL RESPONSES TO PHQ QUESTIONS 1-9: 0
1. LITTLE INTEREST OR PLEASURE IN DOING THINGS: 0

## 2021-04-14 NOTE — PATIENT INSTRUCTIONS
ED [facility]   [address], [place] [address] [address], [address] [phone]  Call for colonoscopy appt

## 2021-04-14 NOTE — PROGRESS NOTES
medications since your last visit including any over-the-counter medicines, vitamins, or herbal medicines? no   Are you having any side effects from any of your medications? -  no  Have you stopped taking any of your medications? Is so, why? -  no    Have you seen any other physician or provider since your last visit? No  Have you had any other diagnostic tests since your last visit? No  Have you been seen in the emergency room and/or had an admission to a hospital since we last saw you? No  Have you had your routine dental cleaning in the past 6 months? yes -     Have you activated your [employer] account? If not, what are your barriers?  Yes     Patient Care Team:  CHARLINE Knowles CNP as PCP - General (Family Medicine)  CHARLINE Knowles CNP as PCP - [facility] Provider    Medical History Review  Past Medical, Family, and Social History reviewed and does not contribute to the patient presenting condition    Health Maintenance   Topic Date Due    Hepatitis C screen  Never done    Pneumococcal 0-64 years Vaccine (1 of 1 - PPSV23) Never done    COVID-19 Vaccine (1) Never done    DTaP/Tdap/Td vaccine (1 - Tdap) Never done    Shingles Vaccine (1 of 2) Never done    Potassium monitoring  02/18/2020    Creatinine monitoring  02/18/2020    Colon cancer screen colonoscopy  03/27/2020    Breast cancer screen  09/04/2020    Flu vaccine (Season Ended) 09/01/2021    Lipid screen  02/18/2024    Cervical cancer screen  05/28/2025    HIV screen  Completed    Hepatitis A vaccine  Aged Out    Hepatitis B vaccine  Aged Out    Hib vaccine  Aged Out    Meningococcal (ACWY) vaccine  Aged Out

## 2021-07-15 ENCOUNTER — HOSPITAL ENCOUNTER (OUTPATIENT)
Age: 56
Setting detail: SPECIMEN
Discharge: HOME | End: 2021-07-15
Payer: MEDICARE

## 2021-07-15 ENCOUNTER — OFFICE VISIT (OUTPATIENT)
Dept: OBGYN CLINIC | Age: 56
End: 2021-07-15
Payer: MEDICARE

## 2021-07-15 VITALS
HEIGHT: 68 IN | WEIGHT: 156 LBS | BODY MASS INDEX: 23.64 KG/M2 | HEART RATE: 79 BPM | DIASTOLIC BLOOD PRESSURE: 80 MMHG | SYSTOLIC BLOOD PRESSURE: 138 MMHG

## 2021-07-15 DIAGNOSIS — N76.0 ACUTE VAGINITIS: ICD-10-CM

## 2021-07-15 DIAGNOSIS — Z01.419 PAP SMEAR, AS PART OF ROUTINE GYNECOLOGICAL EXAMINATION: ICD-10-CM

## 2021-07-15 DIAGNOSIS — Z12.31 ENCOUNTER FOR SCREENING MAMMOGRAM FOR BREAST CANCER: Primary | ICD-10-CM

## 2021-07-15 PROCEDURE — 99396 PREV VISIT EST AGE 40-64: CPT | Performed by: CLINICAL NURSE SPECIALIST

## 2021-07-15 RX ORDER — CLINDAMYCIN HYDROCHLORIDE 300 MG/1
300 CAPSULE ORAL 2 TIMES DAILY
Qty: 14 CAPSULE | Refills: 0 | Status: SHIPPED | OUTPATIENT
Start: 2021-07-15 | End: 2021-09-21 | Stop reason: SDUPTHER

## 2021-07-15 NOTE — PROGRESS NOTES
[address] [facility]  [address]  [facility] [address]  Dept: [phone]        DATE OF VISIT:  7/15/21        History and Physical    Kandace PICKARD Minor    :  1965  CHIEF COMPLAINT:    Chief Complaint   Patient presents with    Gynecologic Exam                    Manish Walsh is a 55 y.o. female who presents for annual well woman exam with pap and declined STD screening. The patient was seen and examined. Per the patient bowels areregular. She has no voiding complaints. She denies any bloating as well as vaginal discharge. Chaperone for Intimate Exam   Chaperone was offered and accepted as part of the rooming process.    Chaperone: April Robles     _____________________________________________________________________  Past Medical History:   Diagnosis Date    Chronic back pain     Essential hypertension 2020    Fibroid uterus     Post-menopausal bleeding     UTI (urinary tract infection)                                                                    Past Surgical History:   Procedure Laterality Date    COLONOSCOPY N/A 3/27/2019    COLONOSCOPY WITH BIOPSY performed by [facility] at [facility]    HYSTEROSCOPY  6/4/15    and D&C    OTHER SURGICAL HISTORY      vaginal warts removed    TONSILLECTOMY      at age 12   [address]     Family History   Problem Relation Age of Onset    High Blood Pressure Mother     High Blood Pressure Father     Heart Disease Father      Social History     Tobacco Use   Smoking Status Current Some Day Smoker    Packs/day: 1.00    Years: 0.00    Pack years: 0.00    Types: Cigarettes   Smokeless Tobacco Never Used     Social History     Substance and Sexual Activity   Alcohol Use Yes    Comment: social     Current Outpatient Medications   Medication Sig Dispense Refill    clindamycin (CLEOCIN) 300 MG capsule Take 1 capsule by mouth 2 times daily for 7 days 14 capsule 0    naproxen (NAPROSYN) 500 MG tablet Take 1 tablet by mouth 2 times daily (with meals) 60 tablet 0    ibuprofen (IBU) 400 MG tablet Take 1 tablet by mouth every 6 hours as needed for Pain 30 tablet 0    enalapril (VASOTEC) 20 MG tablet TAKE 1 TABLET BY MOUTH DAILY (Patient not taking: Reported on 7/15/2021) 90 tablet 0     No current facility-administered medications for this visit. Allergies: Allergies   Allergen Reactions    Codeine Nausea And Vomiting       Gynecologic History:  Patient's last menstrual period was 2013. Sexually Active: No  STD History:No  Birth Control: No    OB History    Para Term  AB Living   6 1 0 0 0 1   SAB TAB Ectopic Molar Multiple Live Births   0 0 0 0 0 0     ______________________________________________________________________    Review of Systems    REVIEW OFSYSTEMS:        Constitutional:  Unexpected weight change weight loss 12 lbs over 7 months, extreme fatigue, night sweats              yes  Skin:                           Rashes, moles   no  Neurological:  Frequentheadaches, seizures         no  Ophthalmic:  Recent visual changes no  ENT:   Difficulty swallowing  no  Breast:              Masses, pain, nipple discharge                            no     Respiratory:  Shortness of breath, coughing           no    Cardiovascular: Chest pain   no     Gastrointestinal: Chronic diarrhea/constipation, nausea/vomiting           no   Urogenital:  Urinary incontinence, frequency, urgency          no                                         Heavy/irregular periods           no                                      Vaginal discharge         White           yes  Hematological: Bruises easy jin clotting disorder  yes     Endocrine:  Hot flashes daily  yes     Hot/Cold Intolerance  no    Psychological:            Mood and affect were within normal limits.         no                 Physical Exam    Physical Exam:    Vitals:    07/15/21 0954   BP: 138/80   Site: Right Upper Arm   Position: Sitting   Pulse: 79   Weight: 156 lb (70.8 kg)   Height: 5' 8\" (1.727 m)       General Appearance: This  is a well developed, well nourished, well groomed female. Her BMI was reviewed. Nutritional decision making andexercise were discussed. Neurological:  The patient is alert and oriented to time,place, person, and situation    Skin:  A brief inspection of the skin revealed no rashes or lesions. Neck:  The neck was supple. Respiratory: There was unlabored respiratory effort. Lungs clear to ascultation. Cardiovascular: The patients extremities were without calf tenderness or edema. Heart with a regular rate and rhythm. Abdomen: The abdomen was soft and non-tender with no guarding, rebound or rigidity. No hernias were appreciated. Breast:   The patients breasts were symmetrical.  There were no masses, discharge or retractions noted. Self breast exams were reviewed. Pelvic Exam:  The external genitalia was with a normal appearance. The vaginal vault was normal. There were no cystocele, rectocele, or enterocele appreciated. There was thin white vaginal discharge. The cervix was without lesions. There was no cervical motion tenderness. The uterus was mobile, midline and regular. The adnexa no fullness, tenderness or masses appreciated. ASSESSMENT:     Normal annual well woman exam with vaginitis    55 y.o. Female; Annual   Diagnosis Orders   1. Encounter for screening mammogram for breast cancer  STEPHANIE DIGITAL SCREEN W OR WO CAD BILATERAL   2. Pap smear, as part of routine gynecological examination  PAP SMEAR   3. Acute vaginitis  clindamycin (CLEOCIN) 300 MG capsule                  PLAN:  - Pap collected. Discussed new papsmear guidelines. - Discussed with patient taking calcium 1200 mg daily along with Vit. D3 800 IU daily and patient verbalized understanding.   - Smoking risk factors Discussed  - Diet and exercise reviewed.    - Routine healthmaintenance per patients PCP.  - Return to clinic in 1 year or earlier with questions, problems, concerns. Return for 1 year for Annual and as needed.         Electronically signed by CHARLINE Arzate CNP on 7/15/2021 at 10:12 AM

## 2021-07-20 LAB
HPV SAMPLE: ABNORMAL
HPV, GENOTYPE 16: NOT DETECTED
HPV, GENOTYPE 18: DETECTED
HPV, HIGH RISK OTHER: NOT DETECTED
HPV, INTERPRETATION: ABNORMAL
SPECIMEN DESCRIPTION: ABNORMAL

## 2021-07-22 LAB — CYTOLOGY REPORT: NORMAL

## 2021-07-28 ENCOUNTER — TELEPHONE (OUTPATIENT)
Dept: OBGYN CLINIC | Age: 56
End: 2021-07-28

## 2021-08-18 ENCOUNTER — HOSPITAL ENCOUNTER (OUTPATIENT)
Age: 56
Setting detail: SPECIMEN
Discharge: HOME | End: 2021-08-18
Payer: MEDICARE

## 2021-08-18 ENCOUNTER — PROCEDURE VISIT (OUTPATIENT)
Dept: OBGYN CLINIC | Age: 56
End: 2021-08-18
Payer: MEDICARE

## 2021-08-18 VITALS
HEIGHT: 68 IN | HEART RATE: 84 BPM | SYSTOLIC BLOOD PRESSURE: 136 MMHG | DIASTOLIC BLOOD PRESSURE: 88 MMHG | BODY MASS INDEX: 24.25 KG/M2 | WEIGHT: 160 LBS

## 2021-08-18 DIAGNOSIS — R87.810 PAPANICOLAOU SMEAR OF CERVIX WITH POSITIVE HIGH RISK HUMAN PAPILLOMA VIRUS (HPV) TEST: Primary | ICD-10-CM

## 2021-08-18 DIAGNOSIS — Z32.02 NEGATIVE PREGNANCY TEST: ICD-10-CM

## 2021-08-18 LAB
CONTROL: NORMAL
PREGNANCY TEST URINE, POC: NEGATIVE

## 2021-08-18 PROCEDURE — 81025 URINE PREGNANCY TEST: CPT | Performed by: OBSTETRICS & GYNECOLOGY

## 2021-08-18 RX ORDER — CLINDAMYCIN HYDROCHLORIDE 300 MG/1
300 CAPSULE ORAL 2 TIMES DAILY
COMMUNITY
End: 2021-09-13

## 2021-08-18 NOTE — PROGRESS NOTES
COLPOSCOPY PROCEDURE FORM    8/18/2021  Kandace Walsh  Patient's last menstrual period was 02/28/2013.  55 y.o. Social History     Tobacco Use   Smoking Status Current Some Day Smoker    Packs/day: 1.00    Years: 0.00    Pack years: 0.00    Types: Cigarettes   Smokeless Tobacco Never Used         INDICATIONS: NEGATIVE pap smear with + HR HPV 18    COLPOSCOPIC EXAMINATION:                Blood pressure 136/88, pulse 84, height 5' 8\" (1.727 m), weight 160 lb (72.6 kg), last menstrual period 02/28/2013, not currently breastfeeding. Gross observations: completely normal appearing cervix with NO acetyl white changes, punctation, mosaicism or atypical vessels    Satisfactory: Yes     FINDINGS:  completely normal appearing cervix with NO acetyl white changes, punctation, mosaicism or atypical vessels     ECC performed:  Yes    Lugols Iodine Applied:   No       IMPRESSIONS: awaiting pathology  Biopsy sites: ECC only     PLAN: The patient was given formal restriction guidelines. She is to RTO in 2 weeks. FOR PATIENTS WHO UNDERWENT A BIOPSY-They were instructed to report any increase in vaginal bleeding, discharge, or any temperature more than 100.4 F. Strict pelvic rest precautions were reviewed with lifting restrictions.

## 2021-08-20 LAB — SURGICAL PATHOLOGY REPORT: NORMAL

## 2021-09-09 ENCOUNTER — HOSPITAL ENCOUNTER (OUTPATIENT)
Dept: WOMENS IMAGING | Age: 56
Discharge: HOME | End: 2021-09-11
Payer: MEDICARE

## 2021-09-09 DIAGNOSIS — Z12.31 ENCOUNTER FOR SCREENING MAMMOGRAM FOR BREAST CANCER: ICD-10-CM

## 2021-09-09 PROCEDURE — 77063 BREAST TOMOSYNTHESIS BI: CPT

## 2021-09-13 ENCOUNTER — OFFICE VISIT (OUTPATIENT)
Dept: OBGYN CLINIC | Age: 56
End: 2021-09-13
Payer: MEDICARE

## 2021-09-13 VITALS
DIASTOLIC BLOOD PRESSURE: 70 MMHG | HEART RATE: 72 BPM | SYSTOLIC BLOOD PRESSURE: 130 MMHG | HEIGHT: 68 IN | BODY MASS INDEX: 24.25 KG/M2 | WEIGHT: 160 LBS

## 2021-09-13 DIAGNOSIS — N87.0 DYSPLASIA OF CERVIX, LOW GRADE (CIN 1): Primary | ICD-10-CM

## 2021-09-13 PROCEDURE — 3017F COLORECTAL CA SCREEN DOC REV: CPT | Performed by: SPECIALIST

## 2021-09-13 PROCEDURE — 4004F PT TOBACCO SCREEN RCVD TLK: CPT | Performed by: SPECIALIST

## 2021-09-13 PROCEDURE — G8427 DOCREV CUR MEDS BY ELIG CLIN: HCPCS | Performed by: SPECIALIST

## 2021-09-13 PROCEDURE — 99212 OFFICE O/P EST SF 10 MIN: CPT | Performed by: SPECIALIST

## 2021-09-13 PROCEDURE — G8420 CALC BMI NORM PARAMETERS: HCPCS | Performed by: SPECIALIST

## 2021-09-13 ASSESSMENT — ENCOUNTER SYMPTOMS
CONSTIPATION: 0
EYE PAIN: 0
DIARRHEA: 0
ABDOMINAL DISTENTION: 0
VOMITING: 0
APNEA: 0
COUGH: 0
NAUSEA: 0
ABDOMINAL PAIN: 0

## 2021-09-13 NOTE — PROGRESS NOTES
neck pain and neck stiffness. Skin: Negative. Neurological: Negative for light-headedness and numbness. Hematological: Negative. Does not bruise/bleed easily. Objective:   Physical Exam  Vitals and nursing note reviewed. Constitutional:       Appearance: She is well-developed. HENT:      Head: Normocephalic and atraumatic. Neck:      Thyroid: No thyromegaly. Cardiovascular:      Rate and Rhythm: Normal rate and regular rhythm. Pulmonary:      Effort: Pulmonary effort is normal.      Breath sounds: Normal breath sounds. No wheezing. Abdominal:      General: Bowel sounds are normal. There is no distension. Palpations: Abdomen is soft. There is no mass. Tenderness: There is no abdominal tenderness. There is no guarding. Musculoskeletal:         General: Normal range of motion. Cervical back: Normal range of motion and neck supple. Skin:     General: Skin is dry. Neurological:      Mental Status: She is alert and oriented to person, place, and time. Psychiatric:         Behavior: Behavior normal.         Thought Content: Thought content normal.         Assessment:      Patient with positive type 18 HPV here to discuss the result of her colposcopy. Pathology showing CIN1 was reviewed and explained to patient. Patient was advised to repeat pap smear in 1 year. Plan:     Appointment in 1 year. Candy Garcia am scribing for, and in the presence of Dr. Rivera Del Valle. Electronically signed by: Enoc Hernandez 9/13/21 4:15 PM       I agree to the above documentation placed by my scribe Enoc Hernandez. I reviewed the scribe's note and agree with the documented findings and plan of care. Any areas of disagreement are noted on the chart. I have personally evaluated this patient. Additional findings are as noted.  I agree with the chief complaint, past medical history, past surgical history, allergies, medications, social and family history as documented unless otherwise noted below.      Electronically signed by Autumn Link MD on 9/13/2021 at 5:01 PM

## 2021-09-14 DIAGNOSIS — S16.1XXA STRAIN OF NECK MUSCLE, INITIAL ENCOUNTER: ICD-10-CM

## 2021-09-14 DIAGNOSIS — S46.811A STRAIN OF RIGHT TRAPEZIUS MUSCLE, INITIAL ENCOUNTER: ICD-10-CM

## 2021-09-14 NOTE — TELEPHONE ENCOUNTER
----- Message from Thuan Santillan sent at 9/14/2021 12:20 PM EDT -----  Subject: Refill Request    QUESTIONS  Name of Medication? naproxen (NAPROSYN) 500 MG tablet  Patient-reported dosage and instructions? Take 1 tablet by mouth 2 times   daily (with meals  How many days do you have left? 0  Preferred Pharmacy? [address] [facility] phone number (if available)? [phone]  ---------------------------------------------------------------------------  --------------  CALL BACK INFO  What is the best way for the office to contact you? OK to leave message on   voicemail  Preferred Call Back Phone Number?  [phone]

## 2021-09-15 RX ORDER — NAPROXEN 500 MG/1
500 TABLET ORAL 2 TIMES DAILY WITH MEALS
Qty: 60 TABLET | Refills: 0 | Status: SHIPPED | OUTPATIENT
Start: 2021-09-15 | End: 2021-10-20 | Stop reason: SDUPTHER

## 2021-09-21 ENCOUNTER — TELEPHONE (OUTPATIENT)
Dept: OBGYN CLINIC | Age: 56
End: 2021-09-21

## 2021-09-21 DIAGNOSIS — N76.0 ACUTE VAGINITIS: ICD-10-CM

## 2021-09-21 DIAGNOSIS — I10 ESSENTIAL HYPERTENSION: ICD-10-CM

## 2021-09-21 RX ORDER — CLINDAMYCIN HYDROCHLORIDE 300 MG/1
300 CAPSULE ORAL 2 TIMES DAILY
Qty: 14 CAPSULE | Refills: 0 | Status: SHIPPED | OUTPATIENT
Start: 2021-09-21 | End: 2021-09-28

## 2021-09-21 RX ORDER — ENALAPRIL MALEATE 20 MG/1
20 TABLET ORAL DAILY
Qty: 30 TABLET | Refills: 0 | Status: SHIPPED | OUTPATIENT
Start: 2021-09-21 | End: 2021-12-07 | Stop reason: SDUPTHER

## 2021-09-21 NOTE — TELEPHONE ENCOUNTER
----- Message from Kendy Dixon sent at 9/21/2021  9:17 AM EDT -----  Subject: Refill Request    QUESTIONS  Name of Medication? enalapril (VASOTEC) 20 MG tablet  Patient-reported dosage and instructions? 20MG, 1 DAILY  How many days do you have left? 0  Preferred Pharmacy? [address] [facility] phone number (if available)? [phone]  ---------------------------------------------------------------------------  --------------  CALL BACK INFO  What is the best way for the office to contact you? OK to leave message on   voicemail  Preferred Call Back Phone Number?  [phone]

## 2021-09-21 NOTE — TELEPHONE ENCOUNTER
Patient states she was supposed to have a script sent of Clindamycin 300 mg  1 cap two times a day for 7 days. But when she went to the pharmacy it wasn't there. Please advise.

## 2021-10-20 DIAGNOSIS — S46.811A STRAIN OF RIGHT TRAPEZIUS MUSCLE, INITIAL ENCOUNTER: ICD-10-CM

## 2021-10-20 DIAGNOSIS — S16.1XXA STRAIN OF NECK MUSCLE, INITIAL ENCOUNTER: ICD-10-CM

## 2021-10-20 RX ORDER — NAPROXEN 500 MG/1
500 TABLET ORAL 2 TIMES DAILY WITH MEALS
Qty: 60 TABLET | Refills: 0 | Status: SHIPPED | OUTPATIENT
Start: 2021-10-20 | End: 2021-12-22 | Stop reason: SDUPTHER

## 2021-10-20 NOTE — TELEPHONE ENCOUNTER
----- Message from [facility] sent at 10/19/2021  5:12 PM EDT -----  Subject: Refill Request    QUESTIONS  Name of Medication? naproxen (NAPROSYN) 500 MG tablet  Patient-reported dosage and instructions? 1 tablet every 8 hours  How many days do you have left? 5  [facility] Pharm[facility]? [facility] phone number (if available)? [phone]  ---------------------------------------------------------------------------  --------------  CALL BACK INFO  What is the best way for the office to contact you? OK to leave message on   voicemail  Preferred Call Back Phone Number?  [phone]

## 2021-12-02 ENCOUNTER — TELEPHONE (OUTPATIENT)
Dept: PRIMARY CARE CLINIC | Age: 56
End: 2021-12-02

## 2021-12-02 NOTE — TELEPHONE ENCOUNTER
Return call to patient regarding appt request and patient was given information for walk in in [place] due to late work hours and availability of patient schedule.

## 2021-12-03 DIAGNOSIS — I10 ESSENTIAL HYPERTENSION: ICD-10-CM

## 2021-12-07 RX ORDER — ENALAPRIL MALEATE 20 MG/1
20 TABLET ORAL DAILY
Qty: 30 TABLET | Refills: 0 | Status: SHIPPED | OUTPATIENT
Start: 2021-12-07 | End: 2021-12-22 | Stop reason: SDUPTHER

## 2021-12-07 NOTE — TELEPHONE ENCOUNTER
Refill sent to local pharmacy, please contact patient to schedule follow-up for blood pressure before further refills provided

## 2021-12-22 ENCOUNTER — OFFICE VISIT (OUTPATIENT)
Dept: PRIMARY CARE CLINIC | Age: 56
End: 2021-12-22
Payer: MEDICARE

## 2021-12-22 VITALS
TEMPERATURE: 97.2 F | BODY MASS INDEX: 23.72 KG/M2 | DIASTOLIC BLOOD PRESSURE: 86 MMHG | SYSTOLIC BLOOD PRESSURE: 139 MMHG | WEIGHT: 156 LBS | OXYGEN SATURATION: 94 % | HEART RATE: 68 BPM

## 2021-12-22 DIAGNOSIS — M25.561 ACUTE PAIN OF RIGHT KNEE: ICD-10-CM

## 2021-12-22 DIAGNOSIS — I10 ESSENTIAL HYPERTENSION: Primary | ICD-10-CM

## 2021-12-22 DIAGNOSIS — Z23 NEED FOR VACCINATION: ICD-10-CM

## 2021-12-22 DIAGNOSIS — J32.9 SINUSITIS, UNSPECIFIED CHRONICITY, UNSPECIFIED LOCATION: ICD-10-CM

## 2021-12-22 DIAGNOSIS — F17.200 CURRENT SMOKER ON SOME DAYS: ICD-10-CM

## 2021-12-22 PROCEDURE — G8484 FLU IMMUNIZE NO ADMIN: HCPCS | Performed by: NURSE PRACTITIONER

## 2021-12-22 PROCEDURE — G8427 DOCREV CUR MEDS BY ELIG CLIN: HCPCS | Performed by: NURSE PRACTITIONER

## 2021-12-22 PROCEDURE — 90732 PPSV23 VACC 2 YRS+ SUBQ/IM: CPT | Performed by: NURSE PRACTITIONER

## 2021-12-22 PROCEDURE — 3017F COLORECTAL CA SCREEN DOC REV: CPT | Performed by: NURSE PRACTITIONER

## 2021-12-22 PROCEDURE — 99214 OFFICE O/P EST MOD 30 MIN: CPT | Performed by: NURSE PRACTITIONER

## 2021-12-22 PROCEDURE — 4004F PT TOBACCO SCREEN RCVD TLK: CPT | Performed by: NURSE PRACTITIONER

## 2021-12-22 PROCEDURE — G8420 CALC BMI NORM PARAMETERS: HCPCS | Performed by: NURSE PRACTITIONER

## 2021-12-22 PROCEDURE — 90471 IMMUNIZATION ADMIN: CPT | Performed by: NURSE PRACTITIONER

## 2021-12-22 RX ORDER — ENALAPRIL MALEATE 20 MG/1
20 TABLET ORAL DAILY
Qty: 30 TABLET | Refills: 3 | Status: SHIPPED | OUTPATIENT
Start: 2021-12-22 | End: 2022-04-27

## 2021-12-22 RX ORDER — AMOXICILLIN AND CLAVULANATE POTASSIUM 500; 125 MG/1; MG/1
1 TABLET, FILM COATED ORAL 2 TIMES DAILY
Qty: 14 TABLET | Refills: 0 | Status: SHIPPED | OUTPATIENT
Start: 2021-12-22 | End: 2021-12-29

## 2021-12-22 RX ORDER — NAPROXEN 500 MG/1
500 TABLET ORAL 2 TIMES DAILY WITH MEALS
Qty: 60 TABLET | Refills: 0 | Status: SHIPPED | OUTPATIENT
Start: 2021-12-22 | End: 2022-05-05 | Stop reason: SDUPTHER

## 2021-12-22 ASSESSMENT — ENCOUNTER SYMPTOMS
RHINORRHEA: 0
WHEEZING: 0
ORTHOPNEA: 0
BLURRED VISION: 0
CHOKING: 0
VOMITING: 0
SORE THROAT: 0
BLOOD IN STOOL: 0
SINUS PAIN: 0
DIARRHEA: 0
ABDOMINAL DISTENTION: 0
ABDOMINAL PAIN: 0
BACK PAIN: 0
COUGH: 1
TROUBLE SWALLOWING: 0
HEMOPTYSIS: 0
SHORTNESS OF BREATH: 0
NAUSEA: 0

## 2021-12-22 ASSESSMENT — PATIENT HEALTH QUESTIONNAIRE - PHQ9
SUM OF ALL RESPONSES TO PHQ9 QUESTIONS 1 & 2: 0
SUM OF ALL RESPONSES TO PHQ QUESTIONS 1-9: 0
2. FEELING DOWN, DEPRESSED OR HOPELESS: 0
1. LITTLE INTEREST OR PLEASURE IN DOING THINGS: 0

## 2021-12-22 NOTE — PATIENT INSTRUCTIONS
Patient Education        Pneumococcal Polysaccharide Vaccine: What You Need to Know  Why get vaccinated? Pneumococcal polysaccharide vaccine (PPSV23) can prevent pneumococcal disease. Pneumococcal disease refers to any illness caused by pneumococcal bacteria. These bacteria can cause many types of illnesses, including pneumonia, which is an infection of the lungs. Pneumococcal bacteria are one of the most common causes of pneumonia. Besides pneumonia, pneumococcal bacteria can also cause:  · Ear infections,  · Sinus infections  · Meningitis (infection of the tissue covering the brain and spinal cord)  · Bacteremia (bloodstream infection)  Anyone can get pneumococcal disease, but children under 3years of age, people with certain medical conditions, adults 72 years or older, and cigarette smokers are at the highest risk. Most pneumococcal infections are mild. However, some can result in long-term problems, such as brain damage or hearing loss. Meningitis, bacteremia, and pneumonia caused by pneumococcal disease can be fatal.  PPSV23  PPSV23 protects against 23 types of bacteria that cause pneumococcal disease. PPSV23 is recommended for:  · All adults 72 years or older,  · Anyone 2 years or older with certain medical conditions that can lead to an increased risk for pneumococcal disease. Most people need only one dose of PPSV23. A second dose of PPSV23, and another type of pneumococcal vaccine called PCV13, are recommended for certain high-risk groups. Your health care provider can give you more information. People 65 years or older should get a dose of PPSV23 even if they have already gotten one or more doses of the vaccine before they turned 65. Talk with your health care provider  Tell your vaccine provider if the person getting the vaccine:  · Has had an allergic reaction after a previous dose of PPSV23, or has any severe, life-threatening allergies.   In some cases, your health care provider may decide to postpone PPSV23 vaccination to a future visit. People with minor illnesses, such as a cold, may be vaccinated. People who are moderately or severely ill should usually wait until they recover before getting PPSV23. Your health care provider can give you more information. Risks of a vaccine reaction  · Redness or pain where the shot is given, feeling tired, fever, or muscle aches can happen after PPSV23. People sometimes faint after medical procedures, including vaccination. Tell your provider if you feel dizzy or have vision changes or ringing in the ears. As with any medicine, there is a very remote chance of a vaccine causing a severe allergic reaction, other serious injury, or death. What if there is a serious problem? An allergic reaction could occur after the vaccinated person leaves the clinic. If you see signs of a severe allergic reaction (hives, swelling of the face and throat, difficulty breathing, a fast heartbeat, dizziness, or weakness), call 9-1-1 and get the person to the nearest hospital.  For other signs that concern you, call your health care provider. Adverse reactions should be reported to the Vaccine Adverse Event Reporting System (VAERS). Your health care provider will usually file this report, or you can do it yourself. Visit the VAERS website at www.vaers. hhs.gov at www.vaers. hhs.gov or call 2-630.234.7221. VAERS is only for reporting reactions, and VAERS staff do not give medical advice. How can I learn more? · Ask your health care provider. · Call your local or state health department. · Contact the Centers for Disease Control and Prevention (CDC):  ? Call 1-254.932.3843 (1-800-CDC-INFO) or  ? Visit CDC's website at www.cdc.gov/vaccines  Vaccine Information Statement  PPSV23 Vaccine  10/30/2019  [facility] and [facility] for Disease Control and Prevention  Many Vaccine Information Statements are available in Marshallese and other languages.  See www.immunize.org/vis. Hojas de información Sobre Vacunas están disponibles en español y en muchos otros idiomas. Visite Adrian.si. Care instructions adapted under license by [facility] ([facility]). If you have questions about a medical condition or this instruction, always ask your healthcare professional. [address] any warranty or liability for your use of this information.

## 2021-12-22 NOTE — PROGRESS NOTES
[facility]  [id number] [facility] [address]  Dept: [phone]  Dept Fax: [phone]    Patient Care Team:  CHARLINE Desir CNP as PCP - General (Family Medicine)  CHARLINE Desir CNP as PCP - REHABILITATION [facility] Empaneled Provider    2021     Amauri Walsh (:  [birth year])QU a 55 y.o. female, here for evaluation of the following medical concerns:   Chief Complaint   Patient presents with    Follow-up     HTN    Cough     Dry    Knee Problem     Rt       Asking me to check her right knee today. Has noticed that she can not kneel or put pressure on the right knee, noticed it  when she was looking in he closet. No pain with walking, can sit comfortably. No swelling or redness. No injury to the knee    No recent antibiotics. Is a current smoker, working in quitting. Currently down to 5-10 a day. Cough  This is a new problem. The current episode started 1 to 4 weeks ago (2 weeks). The cough is productive of sputum. Associated symptoms include chills, nasal congestion and postnasal drip. Pertinent negatives include no chest pain, fever, headaches, hemoptysis, rhinorrhea, sore throat, shortness of breath or wheezing. Hypertension  This is a chronic problem. The problem is unchanged. The problem is uncontrolled. Pertinent negatives include no anxiety, blurred vision, chest pain, headaches, neck pain, orthopnea, palpitations, peripheral edema or shortness of breath. Past treatments include ACE inhibitors. The current treatment provides mild improvement. .    Review of Systems   Constitutional: Positive for chills. Negative for appetite change, diaphoresis and fever. HENT: Positive for postnasal drip. Negative for congestion, drooling, rhinorrhea, sinus pain, sore throat and trouble swallowing. Eyes: Negative for blurred vision. Respiratory: Positive for cough.  Negative for hemoptysis, choking, shortness of breath and wheezing. Cardiovascular: Negative for chest pain, palpitations and orthopnea. Gastrointestinal: Negative for abdominal distention, abdominal pain, blood in stool, diarrhea, nausea and vomiting. Genitourinary: Negative for dysuria, flank pain, hematuria, pelvic pain and vaginal bleeding. Musculoskeletal: Negative for arthralgias, back pain, gait problem, neck pain and neck stiffness. Neurological: Negative for dizziness, tremors, weakness, numbness and headaches. Prior to Visit Medications    Medication Sig Taking? Authorizing Provider   BIOTIN PO Take by mouth Yes Historical Provider, MD   VITAMIN D PO Take by mouth Yes Historical Provider, MD   amoxicillin-clavulanate (AUGMENTIN) 500-125 MG per tablet Take 1 tablet by mouth 2 times daily for 7 days Yes CHARLINE Montiel CNP   enalapril (VASOTEC) 20 MG tablet Take 1 tablet by mouth daily Yes CHARLINE Montiel CNP   naproxen (NAPROSYN) 500 MG tablet Take 1 tablet by mouth 2 times daily (with meals) Yes CHARLINE Montiel CNP        Social History     Tobacco Use    Smoking status: Current Some Day Smoker     Packs/day: 1.00     Years: 0.00     Pack years: 0.00     Types: Cigarettes    Smokeless tobacco: Never Used   Substance Use Topics    Alcohol use: Yes     Comment: social        Vitals:    12/22/21 0842 12/22/21 0918   BP: (!) 145/85 139/86   Site: Right Upper Arm    Position: Sitting    Pulse: 71 68   Temp: 97.2 °F (36.2 °C)    TempSrc: Temporal    SpO2: 94%    Weight: 156 lb (70.8 kg)      Estimated body mass index is 23.72 kg/m² as calculated from the following:    Height as of 9/13/21: 5' 8\" (1.727 m). Weight as of this encounter: 156 lb (70.8 kg).     DIAGNOSTIC FINDINGS:  CBC:  Lab Results   Component Value Date    WBC 7.3 07/10/2016    HGB 14.1 07/10/2016     07/10/2016     04/28/2012       BMP:    Lab Results   Component Value Date     02/18/2019    K 3.8 02/18/2019     02/18/2019    CO2 28 02/18/2019    BUN 17 02/18/2019    CREATININE 0.69 02/18/2019    GLUCOSE 88 02/18/2019    GLUCOSE 87 04/28/2012       HEMOGLOBIN A1C: No results found for: LABA1C    FASTING LIPID PANEL:  Lab Results   Component Value Date    CHOL 185 04/28/2012     02/18/2019    TRIG 44 04/28/2012       Physical Exam  Vitals reviewed. Constitutional:       General: She is not in acute distress. Appearance: Normal appearance. She is well-developed. She is not toxic-appearing. HENT:      Head: Normocephalic. Right Ear: Tympanic membrane and external ear normal. Tympanic membrane is not erythematous or bulging. Left Ear: Tympanic membrane and external ear normal. Tympanic membrane is not erythematous or bulging. Nose: Congestion present. Right Sinus: No maxillary sinus tenderness or frontal sinus tenderness. Left Sinus: No maxillary sinus tenderness or frontal sinus tenderness. Mouth/Throat:      Mouth: Mucous membranes are moist. Mucous membranes are not dry. Pharynx: Uvula midline. Posterior oropharyngeal erythema present. No oropharyngeal exudate or uvula swelling. Tonsils: No tonsillar exudate. Eyes:      General: No scleral icterus. Right eye: No discharge. Left eye: No discharge. Conjunctiva/sclera: Conjunctivae normal.   Cardiovascular:      Rate and Rhythm: Normal rate and regular rhythm. Pulmonary:      Effort: Pulmonary effort is normal.      Breath sounds: Normal breath sounds. Abdominal:      Palpations: Abdomen is soft. Tenderness: There is no abdominal tenderness. Musculoskeletal:         General: Normal range of motion. Cervical back: Normal range of motion and neck supple. Right knee: Swelling present. No effusion, erythema or bony tenderness. Normal range of motion. No tenderness. Legs:    Lymphadenopathy:      Head:      Right side of head: No submental adenopathy. Left side of head: No submental adenopathy. Cervical: No cervical adenopathy. Skin:     General: Skin is warm. Coloration: Skin is not jaundiced. Neurological:      Mental Status: She is alert and oriented to person, place, and time. Psychiatric:         Mood and Affect: Mood normal.         Behavior: Behavior normal.         Thought Content: Thought content normal.         ASSESSMENT     Diagnosis Orders   1. Essential hypertension  enalapril (VASOTEC) 20 MG tablet   2. Current smoker on some days     3. Sinusitis, unspecified chronicity, unspecified location  amoxicillin-clavulanate (AUGMENTIN) 500-125 MG per tablet   4. Need for vaccination  PNEUMOVAX 23 subcutaneous/IM (Pneumococcal polysaccharide vaccine 23-valent >= 1yo)   5. Acute pain of right knee            PLAN:  Orders Placed This Encounter   Medications    amoxicillin-clavulanate (AUGMENTIN) 500-125 MG per tablet     Sig: Take 1 tablet by mouth 2 times daily for 7 days     Dispense:  14 tablet     Refill:  0    enalapril (VASOTEC) 20 MG tablet     Sig: Take 1 tablet by mouth daily     Dispense:  30 tablet     Refill:  3    naproxen (NAPROSYN) 500 MG tablet     Sig: Take 1 tablet by mouth 2 times daily (with meals)     Dispense:  60 tablet     Refill:  0         1. Mild swelling distal to patella, suspect infra patellar bursitis. Encourage patient to apply topical pain reliever, use ice and/or heat as tolerated and monitor for symptom improvement  2. Patient with congestion and cough greater than 2 weeks, no recent use of antibiotics. Lungs clear bilaterally. Augmentin started for sinus infection  3. Pressure at goal today, no medication changes made. FOLLOW UP AND INSTRUCTIONS:  Return in about 6 months (around 6/22/2022). · Nicy received counseling on the following healthy behaviors:medication adherence and tobacco cessation    · Discussed use, benefit, and side effects of prescribed medications.   Barriers to  medication compliance addressed. All patient questions answered. Pt  verbalized understanding of all instructions given. · Patient given educational materials - see patient instructions      · Patient advised to contact scheduling offices for any referrals or imaging orders  placed today if they have not been contacted in 48 hours. Return in about 6 months (around 6/22/2022). An electronic signature was used to authenticate this note. --CHARLINE Doan CNP on 12/22/2021 at 11:24 AM  Visit Information    Have you changed or started any medications since your last visit including any over-the-counter medicines, vitamins, or herbal medicines? no   Are you having any side effects from any of your medications? -  no  Have you stopped taking any of your medications? Is so, why? -  no    Have you seen any other physician or provider since your last visit? No  Have you had any other diagnostic tests since your last visit? No  Have you been seen in the emergency room and/or had an admission to a hospital since we last saw you? No  Have you had your routine dental cleaning in the past 6 months? no    Have you activated your [employer] account? If not, what are your barriers?  No: declined     Patient Care Team:  CHARLINE Doan CNP as PCP - General (Family Medicine)  CHARLINE Doan CNP as PCP - [facility] Empaneled Provider    Medical History Review  Past Medical, Family, and Social History reviewed and does not contribute to the patient presenting condition    Health Maintenance   Topic Date Due    Hepatitis C screen  Never done    DTaP/Tdap/Td vaccine (1 - Tdap) Never done    Shingles Vaccine (1 of 2) Never done    Potassium monitoring  02/18/2020    Creatinine monitoring  02/18/2020    Colon cancer screen colonoscopy  03/27/2020    Flu vaccine (1) 12/22/2022 (Originally 9/1/2021)    COVID-19 Vaccine (3 - Booster for Weaver Peter series) 02/04/2022    Breast cancer screen  09/09/2023    Lipid screen  02/18/2024    Cervical cancer screen  07/15/2026    Pneumococcal 0-64 years Vaccine (2 of 2 - PPSV23) 04/05/2031    HIV screen  Completed    Hepatitis A vaccine  Aged Out    Hepatitis B vaccine  Aged Out    Hib vaccine  Aged Out    Meningococcal (ACWY) vaccine  Aged Out

## 2021-12-28 ENCOUNTER — TELEPHONE (OUTPATIENT)
Dept: OBGYN CLINIC | Age: 56
End: 2021-12-28

## 2021-12-29 RX ORDER — CLINDAMYCIN HYDROCHLORIDE 300 MG/1
300 CAPSULE ORAL 2 TIMES DAILY
Qty: 14 CAPSULE | Refills: 0 | Status: SHIPPED | OUTPATIENT
Start: 2021-12-29 | End: 2022-01-05

## 2022-01-23 NOTE — TELEPHONE ENCOUNTER

## 2022-04-13 ENCOUNTER — TELEPHONE (OUTPATIENT)
Dept: OBGYN CLINIC | Age: 57
End: 2022-04-13

## 2022-04-13 RX ORDER — CLINDAMYCIN HYDROCHLORIDE 300 MG/1
300 CAPSULE ORAL 2 TIMES DAILY
Qty: 14 CAPSULE | Refills: 0 | Status: SHIPPED | OUTPATIENT
Start: 2022-04-13 | End: 2022-04-20

## 2022-04-13 NOTE — TELEPHONE ENCOUNTER
Patient is insistent on getting a refill on her clindamycin for BV. Patient said this is a chronic condition. Please advise.

## 2022-04-27 ENCOUNTER — OFFICE VISIT (OUTPATIENT)
Dept: OBGYN CLINIC | Age: 57
End: 2022-04-27
Payer: MEDICARE

## 2022-04-27 ENCOUNTER — HOSPITAL ENCOUNTER (OUTPATIENT)
Age: 57
Setting detail: SPECIMEN
Discharge: HOME | End: 2022-04-27

## 2022-04-27 ENCOUNTER — TELEPHONE (OUTPATIENT)
Dept: OBGYN CLINIC | Age: 57
End: 2022-04-27

## 2022-04-27 VITALS
DIASTOLIC BLOOD PRESSURE: 96 MMHG | SYSTOLIC BLOOD PRESSURE: 130 MMHG | WEIGHT: 165 LBS | HEIGHT: 68 IN | HEART RATE: 66 BPM | BODY MASS INDEX: 25.01 KG/M2

## 2022-04-27 DIAGNOSIS — N76.0 ACUTE VAGINITIS: ICD-10-CM

## 2022-04-27 DIAGNOSIS — E55.9 VITAMIN D DEFICIENCY: Primary | ICD-10-CM

## 2022-04-27 DIAGNOSIS — N76.0 ACUTE VAGINITIS: Primary | ICD-10-CM

## 2022-04-27 LAB
CANDIDA SPECIES, DNA PROBE: NEGATIVE
GARDNERELLA VAGINALIS, DNA PROBE: POSITIVE
SOURCE: ABNORMAL
TRICHOMONAS VAGINALIS DNA: NEGATIVE
UREAP-MYCOPLASMA CUL: NORMAL
VITAMIN D 25-HYDROXY: 17.2 NG/ML

## 2022-04-27 PROCEDURE — 3017F COLORECTAL CA SCREEN DOC REV: CPT | Performed by: CLINICAL NURSE SPECIALIST

## 2022-04-27 PROCEDURE — G8419 CALC BMI OUT NRM PARAM NOF/U: HCPCS | Performed by: CLINICAL NURSE SPECIALIST

## 2022-04-27 PROCEDURE — 99213 OFFICE O/P EST LOW 20 MIN: CPT | Performed by: CLINICAL NURSE SPECIALIST

## 2022-04-27 PROCEDURE — G8427 DOCREV CUR MEDS BY ELIG CLIN: HCPCS | Performed by: CLINICAL NURSE SPECIALIST

## 2022-04-27 PROCEDURE — 4004F PT TOBACCO SCREEN RCVD TLK: CPT | Performed by: CLINICAL NURSE SPECIALIST

## 2022-04-27 RX ORDER — ERGOCALCIFEROL (VITAMIN D2) 1250 MCG
50000 CAPSULE ORAL WEEKLY
Qty: 4 CAPSULE | Refills: 3 | Status: SHIPPED | OUTPATIENT
Start: 2022-04-27 | End: 2022-05-20

## 2022-04-27 RX ORDER — ENALAPRIL MALEATE 20 MG/1
20 TABLET ORAL DAILY
COMMUNITY
End: 2022-05-02

## 2022-04-27 RX ORDER — FLUCONAZOLE 100 MG/1
100 TABLET ORAL DAILY
Qty: 7 TABLET | Refills: 0 | Status: SHIPPED | OUTPATIENT
Start: 2022-04-27 | End: 2022-05-04

## 2022-04-27 RX ORDER — CLINDAMYCIN HYDROCHLORIDE 300 MG/1
300 CAPSULE ORAL 2 TIMES DAILY
Qty: 14 CAPSULE | Refills: 0 | Status: SHIPPED | OUTPATIENT
Start: 2022-04-27 | End: 2022-05-04

## 2022-04-27 ASSESSMENT — ENCOUNTER SYMPTOMS
GASTROINTESTINAL NEGATIVE: 1
EYES NEGATIVE: 1
RESPIRATORY NEGATIVE: 1
ALLERGIC/IMMUNOLOGIC NEGATIVE: 1

## 2022-04-27 NOTE — TELEPHONE ENCOUNTER
----- Message from CHARLINE Alvarez CNP sent at 4/27/2022  3:10 PM EDT -----  Please let her know that she is deficient in her Vit. D and I sent drisdol she will take 1 tab once a week for 3 months then we will redraw her blood the order was placed for the bloodwork she can have that done at any [facility] facility or come here.

## 2022-04-27 NOTE — PROGRESS NOTES
Subjective:      Patient ID:  Reymundo Arauz is a 65 y.o. female who presents for   Chief Complaint   Patient presents with    Vaginitis       HPI      Patient is a 66 yo female who presents for chronic BV. Patient states that currently she has white creamy vaginal discharge with odor for the past 1-2 weeks. Review of Systems   Constitutional: Negative for chills and fever. HENT: Negative. Eyes: Negative. Respiratory: Negative. Cardiovascular: Negative. Gastrointestinal: Negative. Endocrine: Negative. Genitourinary: Positive for vaginal discharge (white creamy discharge for several weeks). Negative for dysuria and menstrual problem. Musculoskeletal: Negative. Skin: Negative. Allergic/Immunologic: Negative. Neurological: Negative. Hematological: Negative. Psychiatric/Behavioral: Negative. BP (!) 130/96   Pulse 66   Ht 5' 8\" (1.727 m)   Wt 165 lb (74.8 kg)   LMP 02/28/2013   Breastfeeding No   BMI 25.09 kg/m²    Patient's last menstrual period was 02/28/2013.     Family History   Problem Relation Age of Onset    High Blood Pressure Mother     High Blood Pressure Father     Heart Disease Father       Past Medical History:   Diagnosis Date    Chronic back pain     Essential hypertension 2/5/2020    Fibroid uterus     Post-menopausal bleeding     UTI (urinary tract infection)       Past Surgical History:   Procedure Laterality Date    COLONOSCOPY N/A 3/27/2019    COLONOSCOPY WITH BIOPSY performed [facility]ia [facility]el [facility]    HYSTEROSCOPY  6/4/15    and D&C    OTHER SURGICAL HISTORY  1984    vaginal warts removed    TONSILLECTOMY      at age 12   Bygget 65      Social History     Socioeconomic History    Marital status: Single     Spouse name: None    Number of children: None    Years of education: None    Highest education level: None   Occupational History    None   Tobacco Use    Smoking status: Current Some Day Smoker Packs/day: 1.00     Years: 0.00     Pack years: 0.00     Types: Cigarettes    Smokeless tobacco: Never Used   Vaping Use    Vaping Use: Never used   Substance and Sexual Activity    Alcohol use: Yes     Comment: social    Drug use: No    Sexual activity: Yes   Other Topics Concern    None   Social History Narrative    None     Social Determinants of Health     Financial Resource Strain:     Difficulty of Paying Living Expenses: Not on file   Food Insecurity:     Worried About Running Out of Food in the Last Year: Not on file    Daniel of Food in the Last Year: Not on file   Transportation Needs:     Lack of Transportation (Medical): Not on file    Lack of Transportation (Non-Medical):  Not on file   Physical Activity:     Days of Exercise per Week: Not on file    Minutes of Exercise per Session: Not on file   Stress:     Feeling of Stress : Not on file   Social Connections:     Frequency of Communication with Friends and Family: Not on file    Frequency of Social Gatherings with Friends and Family: Not on file    Attends [employer] Services: Not on file    Active Member of [address] [place] [address] or Organizations: Not on file    Attends Club or Organization Meetings: Not on file    Marital Status: Not on file   Intimate Partner Violence:     Fear of Current or Ex-Partner: Not on file    Emotionally Abused: Not on file    Physically Abused: Not on file    Sexually Abused: Not on file   Housing Stability:     Unable to Pay for Housing in the Last Year: Not on file    Number of [place] in the Last Year: Not on file    Unstable Housing in the Last Year: Not on file      Current Outpatient Medications   Medication Sig Dispense Refill    enalapril (VASOTEC) 20 MG tablet Take 20 mg by mouth daily      clindamycin (CLEOCIN) 300 MG capsule Take 1 capsule by mouth 2 times daily for 7 days 14 capsule 0    fluconazole (DIFLUCAN) 100 MG tablet Take 1 tablet by mouth daily for 7 days 7 tablet 0    BIOTIN PO Take by mouth      VITAMIN D PO Take by mouth      naproxen (NAPROSYN) 500 MG tablet Take 1 tablet by mouth 2 times daily (with meals) 60 tablet 0     No current facility-administered medications for this visit. Objective:   Physical Exam  Vitals reviewed. Constitutional:       Appearance: She is well-developed. HENT:      Head: Normocephalic and atraumatic. Eyes:      Conjunctiva/sclera: Conjunctivae normal.   Cardiovascular:      Rate and Rhythm: Normal rate and regular rhythm. Pulmonary:      Effort: Pulmonary effort is normal.      Breath sounds: Normal breath sounds. Musculoskeletal:         General: Normal range of motion. Cervical back: Normal range of motion and neck supple. Skin:     General: Skin is warm and dry. Neurological:      Mental Status: She is oriented to person, place, and time. Psychiatric:         Behavior: Behavior normal.         Thought Content: Thought content normal.         Judgment: Judgment normal.         Assessment:      Diagnosis Orders   1. Acute vaginitis  Vaginitis DNA Probe    Culture, Ureaplasma/Mycoplasma hominis    Vitamin D 25 Hydroxy    clindamycin (CLEOCIN) 300 MG capsule           Plan:    Discussed multiple tx options for BV and patient would like to take clindamycin oral  Discussed testing for ureaplasma/mycoplasma as these can exhibit s/s of BV    Return for as needed. Patient was seen with total face to face time of 20 minutes. More than 50% of this visit was on counseling andeducation regarding the problems listed below and her options. She was also counseled on her preventative health maintenance recommendations and follow-up.     Electronically signed by: Deepti Herrmann CNP

## 2022-05-02 ENCOUNTER — TELEPHONE (OUTPATIENT)
Dept: OBGYN CLINIC | Age: 57
End: 2022-05-02

## 2022-05-02 RX ORDER — ENALAPRIL MALEATE 20 MG/1
TABLET ORAL
Qty: 30 TABLET | Refills: 3 | Status: SHIPPED | OUTPATIENT
Start: 2022-05-02 | End: 2022-11-03

## 2022-05-02 NOTE — TELEPHONE ENCOUNTER
Health Maintenance   Topic Date Due    Hepatitis C screen  Never done    DTaP/Tdap/Td vaccine (1 - Tdap) Never done    Diabetes screen  Never done    Shingles vaccine (1 of 2) Never done    Potassium  02/18/2020    Creatinine  02/18/2020    Colorectal Cancer Screen  03/27/2020    Flu vaccine (Season Ended) 12/22/2022 (Originally 9/1/2022)    Depression Screen  12/22/2022    Pneumococcal 0-64 years Vaccine (2 - PCV) 12/22/2022    Breast cancer screen  09/09/2023    Lipids  02/18/2024    Cervical cancer screen  07/15/2026    COVID-19 Vaccine  Completed    HIV screen  Completed    Hepatitis A vaccine  Aged Out    Hepatitis B vaccine  Aged Out    Hib vaccine  Aged Out    Meningococcal (ACWY) vaccine  Aged Out             (applicable per patient's age: Cancer Screenings, Depression Screening, Fall Risk Screening, Immunizations)    LDL Cholesterol (mg/dL)   Date Value   02/18/2019 95     AST (U/L)   Date Value   02/18/2019 19     ALT (U/L)   Date Value   02/18/2019 10     BUN (mg/dL)   Date Value   02/18/2019 17      (goal A1C is < 7)   (goal LDL is <100) need 30-50% reduction from baseline     BP Readings from Last 3 Encounters:   04/27/22 (!) 130/96   12/22/21 139/86   09/13/21 130/70    (goal /80)      All Future Testing planned in [facility]:  Lab Frequency Next Occurrence   STEPHANIE DIGITAL SCREEN W OR WO CAD BILATERAL Once 07/29/2021   Vitamin D 25 Hydroxy Once 05/27/2022       Next Visit Date:  Future Appointments   Date Time Provider Kenya Arechiga   6/22/2022  8:30 AM CHARLINE Sue CNP [facility] [place]            Patient Active Problem List:     Chronic pain of both knees     Chronic midline low back pain without sciatica     Serrated adenoma of colon     Essential hypertension

## 2022-05-03 ENCOUNTER — TELEPHONE (OUTPATIENT)
Dept: OBGYN CLINIC | Age: 57
End: 2022-05-03

## 2022-05-03 DIAGNOSIS — Z22.39 CARRIER OF UREAPLASMA UREALYTICUM: Primary | ICD-10-CM

## 2022-05-03 RX ORDER — DOXYCYCLINE HYCLATE 100 MG
100 TABLET ORAL 2 TIMES DAILY
Qty: 14 TABLET | Refills: 0 | Status: SHIPPED | OUTPATIENT
Start: 2022-05-03 | End: 2022-05-10

## 2022-05-03 RX ORDER — AZITHROMYCIN 500 MG/1
TABLET, FILM COATED ORAL
Qty: 5 TABLET | Refills: 0 | Status: SHIPPED | OUTPATIENT
Start: 2022-05-03 | End: 2022-05-20 | Stop reason: ALTCHOICE

## 2022-05-05 ENCOUNTER — OFFICE VISIT (OUTPATIENT)
Dept: PRIMARY CARE CLINIC | Age: 57
End: 2022-05-05
Payer: MEDICARE

## 2022-05-05 VITALS
TEMPERATURE: 97 F | SYSTOLIC BLOOD PRESSURE: 148 MMHG | OXYGEN SATURATION: 98 % | HEART RATE: 73 BPM | DIASTOLIC BLOOD PRESSURE: 74 MMHG

## 2022-05-05 DIAGNOSIS — L72.3 SEBACEOUS CYST: Primary | ICD-10-CM

## 2022-05-05 DIAGNOSIS — I10 ESSENTIAL HYPERTENSION: ICD-10-CM

## 2022-05-05 PROCEDURE — 99213 OFFICE O/P EST LOW 20 MIN: CPT | Performed by: INTERNAL MEDICINE

## 2022-05-05 PROCEDURE — G8419 CALC BMI OUT NRM PARAM NOF/U: HCPCS | Performed by: INTERNAL MEDICINE

## 2022-05-05 PROCEDURE — G8427 DOCREV CUR MEDS BY ELIG CLIN: HCPCS | Performed by: INTERNAL MEDICINE

## 2022-05-05 PROCEDURE — 4004F PT TOBACCO SCREEN RCVD TLK: CPT | Performed by: INTERNAL MEDICINE

## 2022-05-05 PROCEDURE — 3017F COLORECTAL CA SCREEN DOC REV: CPT | Performed by: INTERNAL MEDICINE

## 2022-05-05 RX ORDER — NAPROXEN 500 MG/1
500 TABLET ORAL 2 TIMES DAILY WITH MEALS
Qty: 60 TABLET | Refills: 0 | Status: SHIPPED | OUTPATIENT
Start: 2022-05-05 | End: 2022-10-26 | Stop reason: SDUPTHER

## 2022-05-05 SDOH — ECONOMIC STABILITY: FOOD INSECURITY: WITHIN THE PAST 12 MONTHS, THE FOOD YOU BOUGHT JUST DIDN'T LAST AND YOU DIDN'T HAVE MONEY TO GET MORE.: NEVER TRUE

## 2022-05-05 SDOH — ECONOMIC STABILITY: FOOD INSECURITY: WITHIN THE PAST 12 MONTHS, YOU WORRIED THAT YOUR FOOD WOULD RUN OUT BEFORE YOU GOT MONEY TO BUY MORE.: NEVER TRUE

## 2022-05-05 ASSESSMENT — SOCIAL DETERMINANTS OF HEALTH (SDOH): HOW HARD IS IT FOR YOU TO PAY FOR THE VERY BASICS LIKE FOOD, HOUSING, MEDICAL CARE, AND HEATING?: VERY HARD

## 2022-05-05 NOTE — PROGRESS NOTES
[facility]  [address] [address]  Dept: [phone]  Dept Fax: [phone]    Carola Walsh is a 65 y.o. female who presents to the urgent care today for her medicalconditions/complaints as noted below. Carmella Peters is c/o of Mass (upper middle back  , started as a small bump  a while ago and has gotten bigger ) and Hypertension      HPI:     Hypertension  This is a chronic problem. The current episode started more than 1 year ago. The problem is unchanged. The problem is controlled. There are no associated agents to hypertension. There are no known risk factors for coronary artery disease. Past treatments include ACE inhibitors. The current treatment provides moderate improvement. There are no compliance problems. Rash  This is a new problem. The current episode started more than 1 month ago. The problem has been gradually worsening since onset. The affected locations include the back. The rash is characterized by swelling. She was exposed to nothing. Past Medical History:   Diagnosis Date    Chronic back pain     Essential hypertension 2/5/2020    Fibroid uterus     Post-menopausal bleeding     UTI (urinary tract infection)         Current Outpatient Medications   Medication Sig Dispense Refill    naproxen (NAPROSYN) 500 MG tablet Take 1 tablet by mouth 2 times daily (with meals) 60 tablet 0    doxycycline hyclate (VIBRA-TABS) 100 MG tablet Take 1 tablet by mouth 2 times daily for 7 days 14 tablet 0    azithromycin (ZITHROMAX) 500 MG tablet Take 2 tablets the day after finishing doxycycline, followed by 1 tablet daily for 3 days.  5 tablet 0    enalapril (VASOTEC) 20 MG tablet TAKE 1 TABLET BY MOUTH DAILY 30 tablet 3    ergocalciferol (DRISDOL) 1.25 MG (99155 UT) capsule Take 1 capsule by mouth once a week 4 capsule 3    BIOTIN PO Take by mouth      VITAMIN D PO Take by mouth       No current facility-administered medications for this visit. Allergies   Allergen Reactions    Codeine Nausea And Vomiting       Health Maintenance   Topic Date Due    Hepatitis C screen  Never done    DTaP/Tdap/Td vaccine (1 - Tdap) Never done    Diabetes screen  Never done    Shingles vaccine (1 of 2) Never done    Potassium  02/18/2020    Creatinine  02/18/2020    Colorectal Cancer Screen  03/27/2020    Flu vaccine (Season Ended) 12/22/2022 (Originally 9/1/2022)    Depression Screen  12/22/2022    Pneumococcal 0-64 years Vaccine (2 - PCV) 12/22/2022    Breast cancer screen  09/09/2023    Lipids  02/18/2024    Cervical cancer screen  07/15/2026    COVID-19 Vaccine  Completed    HIV screen  Completed    Hepatitis A vaccine  Aged Out    Hepatitis B vaccine  Aged Out    Hib vaccine  Aged Out    Meningococcal (ACWY) vaccine  Aged Out       Subjective:      Review of Systems   Skin: Positive for rash. All other systems reviewed and are negative. Objective:     Physical Exam  Vitals reviewed. Constitutional:       Appearance: Normal appearance. HENT:      Head: Normocephalic and atraumatic. Skin:     General: Skin is warm and dry. Neurological:      General: No focal deficit present. Mental Status: She is alert and oriented to person, place, and time. Psychiatric:         Mood and Affect: Mood normal.         Behavior: Behavior normal.       BP (!) 148/74   Pulse 73   Temp 97 °F (36.1 °C) (Temporal)   LMP 02/28/2013   SpO2 98%       Assessment:       Diagnosis Orders   1. Sebaceous cyst  [address] [facility], [place], [facility], General Surgery, [facility]   2. Essential hypertension         Plan:      No follow-ups on file.     Orders Placed This Encounter   Medications    naproxen (NAPROSYN) 500 MG tablet     Sig: Take 1 tablet by mouth 2 times daily (with meals)     Dispense:  60 tablet     Refill:  0     Orders Placed This Encounter   Procedures   [address] [facility], [place], DO, General Surgery, [facility]     Referral Priority:   Routine     Referral Type:   Eval and Treat     Referral Reason:   Specialty Services Required     Referred to Provider:   Sofy Hernandez DO     Requested Specialty:   General Surgery     Number of Visits Requested:   1     She will monitor her BP at home. Patient given educational materials - see patient instructions. Discussed use, benefit, and side effects of prescribed medications. All patientquestions answered. Pt voiced understanding.     Electronically signed by Maribell Brower MD on 5/5/2022at 4:55 PM

## 2022-05-11 ENCOUNTER — OFFICE VISIT (OUTPATIENT)
Dept: PRIMARY CARE CLINIC | Age: 57
End: 2022-05-11
Payer: MEDICARE

## 2022-05-11 ENCOUNTER — OFFICE VISIT (OUTPATIENT)
Dept: SURGERY | Age: 57
End: 2022-05-11
Payer: MEDICARE

## 2022-05-11 VITALS
SYSTOLIC BLOOD PRESSURE: 141 MMHG | OXYGEN SATURATION: 98 % | DIASTOLIC BLOOD PRESSURE: 85 MMHG | HEART RATE: 74 BPM | TEMPERATURE: 97 F

## 2022-05-11 VITALS
HEIGHT: 68 IN | BODY MASS INDEX: 24.71 KG/M2 | TEMPERATURE: 96.9 F | SYSTOLIC BLOOD PRESSURE: 133 MMHG | HEART RATE: 76 BPM | WEIGHT: 163 LBS | DIASTOLIC BLOOD PRESSURE: 83 MMHG

## 2022-05-11 DIAGNOSIS — H10.32 ACUTE BACTERIAL CONJUNCTIVITIS OF LEFT EYE: Primary | ICD-10-CM

## 2022-05-11 DIAGNOSIS — L72.3 SEBACEOUS CYST: Primary | ICD-10-CM

## 2022-05-11 PROCEDURE — 4004F PT TOBACCO SCREEN RCVD TLK: CPT | Performed by: INTERNAL MEDICINE

## 2022-05-11 PROCEDURE — 3017F COLORECTAL CA SCREEN DOC REV: CPT | Performed by: INTERNAL MEDICINE

## 2022-05-11 PROCEDURE — 99213 OFFICE O/P EST LOW 20 MIN: CPT | Performed by: INTERNAL MEDICINE

## 2022-05-11 PROCEDURE — 4004F PT TOBACCO SCREEN RCVD TLK: CPT | Performed by: STUDENT IN AN ORGANIZED HEALTH CARE EDUCATION/TRAINING PROGRAM

## 2022-05-11 PROCEDURE — 3017F COLORECTAL CA SCREEN DOC REV: CPT | Performed by: STUDENT IN AN ORGANIZED HEALTH CARE EDUCATION/TRAINING PROGRAM

## 2022-05-11 PROCEDURE — G8427 DOCREV CUR MEDS BY ELIG CLIN: HCPCS | Performed by: INTERNAL MEDICINE

## 2022-05-11 PROCEDURE — G8420 CALC BMI NORM PARAMETERS: HCPCS | Performed by: INTERNAL MEDICINE

## 2022-05-11 PROCEDURE — G8420 CALC BMI NORM PARAMETERS: HCPCS | Performed by: STUDENT IN AN ORGANIZED HEALTH CARE EDUCATION/TRAINING PROGRAM

## 2022-05-11 PROCEDURE — 99203 OFFICE O/P NEW LOW 30 MIN: CPT | Performed by: STUDENT IN AN ORGANIZED HEALTH CARE EDUCATION/TRAINING PROGRAM

## 2022-05-11 PROCEDURE — G8427 DOCREV CUR MEDS BY ELIG CLIN: HCPCS | Performed by: STUDENT IN AN ORGANIZED HEALTH CARE EDUCATION/TRAINING PROGRAM

## 2022-05-11 RX ORDER — SULFACETAMIDE SODIUM 100 MG/ML
2 SOLUTION/ DROPS OPHTHALMIC EVERY 4 HOURS
Qty: 1 EACH | Refills: 0 | Status: SHIPPED | OUTPATIENT
Start: 2022-05-11 | End: 2022-05-20 | Stop reason: ALTCHOICE

## 2022-05-11 ASSESSMENT — ENCOUNTER SYMPTOMS: EYE DISCHARGE: 1

## 2022-05-11 NOTE — PROGRESS NOTES
Fabiana Walsh is a 65 y.o. female who presents today for evaluation of sebaceous cyst of her back. She claims its been there for about 5 years now and it has recently been getting more swollen and bothersome. Her mother has popped it multiple times recently. She claims it has foul smelling cheesy drainage. She denies fevers or chills. Denies any other significant medical history. She takes a medication for HTN. Denies blood thinners. She does smoke. Patient also has a lipoma of her right anterior shoulder. She noticed it recently but its not bothersome to her. She would like to wait to have it removed if it becomes painful or larger. Past Medical History:   Diagnosis Date    Chronic back pain     Essential hypertension 2/5/2020    Fibroid uterus     Post-menopausal bleeding     UTI (urinary tract infection)        Past Surgical History:   Procedure Laterality Date    COLONOSCOPY N/A 3/27/2019    COLONOSCOPY WITH BIOPSY performed by Mike Carranza MD at [facility]    HYSTEROSCOPY  6/4/15    and D&C    OTHER SURGICAL HISTORY  1984    vaginal warts removed    TONSILLECTOMY      at age 12   [address]       Current Outpatient Medications   Medication Sig Dispense Refill    naproxen (NAPROSYN) 500 MG tablet Take 1 tablet by mouth 2 times daily (with meals) 60 tablet 0    azithromycin (ZITHROMAX) 500 MG tablet Take 2 tablets the day after finishing doxycycline, followed by 1 tablet daily for 3 days. 5 tablet 0    enalapril (VASOTEC) 20 MG tablet TAKE 1 TABLET BY MOUTH DAILY 30 tablet 3    ergocalciferol (DRISDOL) 1.25 MG (27977 UT) capsule Take 1 capsule by mouth once a week 4 capsule 3    BIOTIN PO Take by mouth      VITAMIN D PO Take by mouth       No current facility-administered medications for this visit.        Allergies   Allergen Reactions    Codeine Nausea And Vomiting       Family History   Problem Relation Age of Onset    High Blood Pressure Mother     High Blood Pressure Father     Heart Disease Father        Social History     Socioeconomic History    Marital status: Single     Spouse name: Not on file    Number of children: Not on file    Years of education: Not on file    Highest education level: Not on file   Occupational History    Not on file   Tobacco Use    Smoking status: Current Some Day Smoker     Packs/day: 1.00     Years: 0.00     Pack years: 0.00     Types: Cigarettes    Smokeless tobacco: Never Used   Vaping Use    Vaping Use: Never used   Substance and Sexual Activity    Alcohol use: Yes     Comment: social    Drug use: No    Sexual activity: Yes   Other Topics Concern    Not on file   Social History Narrative    Not on file     Social Determinants of Health     Financial Resource Strain: High Risk    Difficulty of Paying Living Expenses: Very hard   Food Insecurity: No Food Insecurity    Worried About Running Out of Food in the Last Year: Never true    Daniel of Food in the Last Year: Never true   Transportation Needs:     Lack of Transportation (Medical): Not on file    Lack of Transportation (Non-Medical):  Not on file   Physical Activity:     Days of Exercise per Week: Not on file    Minutes of Exercise per Session: Not on file   Stress:     Feeling of Stress : Not on file   Social Connections:     Frequency of Communication with Friends and Family: Not on file    Frequency of Social Gatherings with Friends and Family: Not on file    Attends Jain Services: Not on file    Active Member of Clubs or Organizations: Not on file    Attends Club or Organization Meetings: Not on file    Marital Status: Not on file   Intimate Partner Violence:     Fear of Current or Ex-Partner: Not on file    Emotionally Abused: Not on file    Physically Abused: Not on file    Sexually Abused: Not on file   Housing Stability:     Unable to Pay for Housing in the Last Year: Not on file    Number of [facility] in the Last Year: Not on file    Unstable Housing in the Last Year: Not on file       ROS: Negative except stated in HPI  History obtained from chart review and the patient  General ROS: negative  Respiratory ROS: no cough, shortness of breath, or wheezing  Cardiovascular ROS: no chest pain or dyspnea on exertion  Gastrointestinal ROS: negative  Musculoskeletal ROS: negative  Neurological ROS: negative    Objective   Vitals:    05/11/22 0913   BP: 133/83   Pulse: 76   Temp: 96.9 °F (36.1 °C)     General:A & O x3  HEENT:  NCAT, PERRL, EMOI, oral mucus membrane pink and moist  Lungs: no inc WOB   Heart: RRR  Abdomen: soft, nontender, no HSM, no guarding, no rebound, no masses  Extremity: Normal, without deformities, edema, or skin discoloration  Neuro: CN II-XII grossly intact  Skin: central portion of back with sebaceous cyst notes. Pore with no active drainage. Non tender and not currently infected. No erythema. About 3cm in diameter; lipoma of right anterior shoulder just under bra strap about 3.5cm, soft mobile nontender      Assessment     1. 65yo female with sebaceous cyst of back; lipoma of right shoulder    Plan     1. Will plan for excision of the sebaceous cyst of her back in the OR. Patient would like to not have the lipoma removed just yet as it is not bothersome to her. Procedure, benefits and risks explained to the patient who understood and consent obtained. Electronically signed by Tatiana Vargas DO on 5/11/2022 at 9:40 AM    Attending Physician Statement  I have discussed the case with Dr Adan Russell, including pertinent history and exam findings with the resident. I have seen and examined the patient and the key elements of the encounter have been performed by me. I agree with the assessment, plan and orders as documented by the resident.       Electronically signed by Issac Tanner IV, DO  on 5/13/2022 at 1:18 PM

## 2022-05-11 NOTE — PROGRESS NOTES
[facility]  [address] [address]  Dept: [phone]  Dept Fax: [phone]    Iona Walsh is a 65 y.o. female who presents to the urgent care today for her medicalconditions/complaints as noted below. Iona Walsh is c/o of Conjunctivitis (started today )      HPI:     Conjunctivitis   The current episode started today. The onset was sudden. The problem has been unchanged. Associated symptoms include eye discharge. The left eye is affected. Past Medical History:   Diagnosis Date    Chronic back pain     Essential hypertension 2/5/2020    Fibroid uterus     Post-menopausal bleeding     UTI (urinary tract infection)         Current Outpatient Medications   Medication Sig Dispense Refill    sulfacetamide (BLEPH-10) 10 % ophthalmic solution Place 2 drops into the left eye every 4 hours for 10 days 1 each 0    naproxen (NAPROSYN) 500 MG tablet Take 1 tablet by mouth 2 times daily (with meals) 60 tablet 0    azithromycin (ZITHROMAX) 500 MG tablet Take 2 tablets the day after finishing doxycycline, followed by 1 tablet daily for 3 days. 5 tablet 0    enalapril (VASOTEC) 20 MG tablet TAKE 1 TABLET BY MOUTH DAILY 30 tablet 3    ergocalciferol (DRISDOL) 1.25 MG (50114 UT) capsule Take 1 capsule by mouth once a week 4 capsule 3    BIOTIN PO Take by mouth      VITAMIN D PO Take by mouth       No current facility-administered medications for this visit.      Allergies   Allergen Reactions    Codeine Nausea And Vomiting       Health Maintenance   Topic Date Due    Hepatitis C screen  Never done    DTaP/Tdap/Td vaccine (1 - Tdap) Never done    Shingles vaccine (1 of 2) Never done    Colorectal Cancer Screen  03/27/2020    Flu vaccine (Season Ended) 12/22/2022 (Originally 9/1/2022)    Depression Screen  12/22/2022    Pneumococcal 0-64 years Vaccine (2 - PCV) 12/22/2022    Breast cancer screen  09/09/2023    Lipids  02/18/2024    Cervical cancer screen  07/15/2026    COVID-19 Vaccine  Completed    HIV screen  Completed    Hepatitis A vaccine  Aged Out    Hepatitis B vaccine  Aged Out    Hib vaccine  Aged Out    Meningococcal (ACWY) vaccine  Aged Out       Subjective:      Review of Systems   Eyes: Positive for discharge. All other systems reviewed and are negative. Objective:     Physical Exam  Vitals reviewed. Constitutional:       Appearance: Normal appearance. HENT:      Head: Normocephalic and atraumatic. Eyes:      Conjunctiva/sclera:      Left eye: Left conjunctiva is injected. Exudate present. Skin:     General: Skin is warm and dry. Neurological:      General: No focal deficit present. Mental Status: She is alert and oriented to person, place, and time. Psychiatric:         Mood and Affect: Mood normal.         Behavior: Behavior normal.       BP (!) 141/85   Pulse 74   Temp 97 °F (36.1 °C) (Temporal)   LMP 02/28/2013   SpO2 98%       Assessment:       Diagnosis Orders   1. Acute bacterial conjunctivitis of left eye  sulfacetamide (BLEPH-10) 10 % ophthalmic solution       Plan:      No follow-ups on file. Orders Placed This Encounter   Medications    sulfacetamide (BLEPH-10) 10 % ophthalmic solution     Sig: Place 2 drops into the left eye every 4 hours for 10 days     Dispense:  1 each     Refill:  0     No orders of the defined types were placed in this encounter. Patient given educational materials - see patient instructions. Discussed use, benefit, and side effects of prescribed medications. All patientquestions answered. Pt voiced understanding.     Electronically signed by Evangelina Hoover MD on 5/11/2022at 5:05 PM

## 2022-05-11 NOTE — LETTER
[facility]  [address] [address]  Phone: [phone]  Fax: [phone]    Marisel Dunlap MD        May 11, 2022     Patient: Mavis Walsh   YOB: 1965   Date of Visit: 5/11/2022       To Whom It May Concern: It is my medical opinion that Kandace Walsh may return to work on 5/14/2022 . If you have any questions or concerns, please don't hesitate to call.     Sincerely,        Marisel Dunlap MD

## 2022-05-11 NOTE — PROGRESS NOTES
Visit Information    Have you changed or started any medications since your last visit including any over-the-counter medicines, vitamins, or herbal medicines? no   Have you stopped taking any of your medications? Is so, why? -  no  Are you having any side effects from any of your medications? - no    Have you seen any other physician or provider since your last visit?  no   Have you had any other diagnostic tests since your last visit?  no   Have you been seen in the emergency room and/or had an admission in a hospital since we last saw you?  no   Have you had your routine dental cleaning in the past 6 months?  no     Do you have an active MyChart account? If no, what is the barrier?   No: declined     Patient Care Team:  CHARLINE Landrum CNP as PCP - General (Family Medicine)  CHARLINE Landrum CNP as PCP - [facility]    Medical History Review  Past Medical, Family, and Social History reviewed and does not contribute to the patient presenting condition    Health Maintenance   Topic Date Due    Hepatitis C screen  Never done    DTaP/Tdap/Td vaccine (1 - Tdap) Never done    Shingles vaccine (1 of 2) Never done    Colorectal Cancer Screen  03/27/2020    Flu vaccine (Season Ended) 12/22/2022 (Originally 9/1/2022)    Depression Screen  12/22/2022    Pneumococcal 0-64 years Vaccine (2 - PCV) 12/22/2022    Breast cancer screen  09/09/2023    Lipids  02/18/2024    Cervical cancer screen  07/15/2026    COVID-19 Vaccine  Completed    HIV screen  Completed    Hepatitis A vaccine  Aged Out    Hepatitis B vaccine  Aged Out    Hib vaccine  Aged Out    Meningococcal (ACWY) vaccine  Aged Out

## 2022-05-11 NOTE — PATIENT INSTRUCTIONS
Thank you letting us take care of you today. We hope that all your questions were addressed. If a question was overlooked or something else comes to mind after you return home, please call our office at [phone]. If you need to cancel or change an appointment, surgery or procedure, please contact the office at [phone].

## 2022-05-24 ENCOUNTER — HOSPITAL ENCOUNTER (OUTPATIENT)
Age: 57
Setting detail: OUTPATIENT SURGERY
Discharge: HOME | End: 2022-05-24
Attending: SURGERY | Admitting: SURGERY
Payer: MEDICARE

## 2022-05-24 ENCOUNTER — ANESTHESIA EVENT (OUTPATIENT)
Dept: OPERATING ROOM | Age: 57
End: 2022-05-24
Payer: MEDICARE

## 2022-05-24 ENCOUNTER — ANESTHESIA (OUTPATIENT)
Dept: OPERATING ROOM | Age: 57
End: 2022-05-24
Payer: MEDICARE

## 2022-05-24 VITALS
HEIGHT: 68 IN | BODY MASS INDEX: 24.55 KG/M2 | TEMPERATURE: 97.4 F | HEART RATE: 57 BPM | SYSTOLIC BLOOD PRESSURE: 147 MMHG | RESPIRATION RATE: 13 BRPM | WEIGHT: 162 LBS | DIASTOLIC BLOOD PRESSURE: 82 MMHG | OXYGEN SATURATION: 98 %

## 2022-05-24 DIAGNOSIS — Z87.2 HX OF SEBACEOUS CYST: ICD-10-CM

## 2022-05-24 DIAGNOSIS — G89.18 POSTOPERATIVE PAIN: Primary | ICD-10-CM

## 2022-05-24 LAB
GFR NON-AFRICAN AMERICAN: >60 ML/MIN
GFR SERPL CREATININE-BSD FRML MDRD: >60 ML/MIN
GFR SERPL CREATININE-BSD FRML MDRD: NORMAL ML/MIN/{1.73_M2}
GLUCOSE BLD-MCNC: 78 MG/DL (ref 74–100)
POC BUN: 17 MG/DL (ref 8–26)
POC CREATININE: 0.89 MG/DL (ref 0.51–1.19)

## 2022-05-24 PROCEDURE — 7100000010 HC PHASE II RECOVERY - FIRST 15 MIN: Performed by: SURGERY

## 2022-05-24 PROCEDURE — 93005 ELECTROCARDIOGRAM TRACING: CPT | Performed by: ANESTHESIOLOGY

## 2022-05-24 PROCEDURE — 2580000003 HC RX 258: Performed by: ANESTHESIOLOGY

## 2022-05-24 PROCEDURE — 82565 ASSAY OF CREATININE: CPT

## 2022-05-24 PROCEDURE — 3700000000 HC ANESTHESIA ATTENDED CARE: Performed by: SURGERY

## 2022-05-24 PROCEDURE — 84520 ASSAY OF UREA NITROGEN: CPT

## 2022-05-24 PROCEDURE — 6360000002 HC RX W HCPCS: Performed by: SURGERY

## 2022-05-24 PROCEDURE — 2500000003 HC RX 250 WO HCPCS

## 2022-05-24 PROCEDURE — 82947 ASSAY GLUCOSE BLOOD QUANT: CPT

## 2022-05-24 PROCEDURE — 7100000000 HC PACU RECOVERY - FIRST 15 MIN: Performed by: SURGERY

## 2022-05-24 PROCEDURE — 3600000003 HC SURGERY LEVEL 3 BASE: Performed by: SURGERY

## 2022-05-24 PROCEDURE — 7100000001 HC PACU RECOVERY - ADDTL 15 MIN: Performed by: SURGERY

## 2022-05-24 PROCEDURE — 2580000003 HC RX 258: Performed by: SURGERY

## 2022-05-24 PROCEDURE — 3600000013 HC SURGERY LEVEL 3 ADDTL 15MIN: Performed by: SURGERY

## 2022-05-24 PROCEDURE — 2580000003 HC RX 258

## 2022-05-24 PROCEDURE — 2500000003 HC RX 250 WO HCPCS: Performed by: SURGERY

## 2022-05-24 PROCEDURE — 6360000002 HC RX W HCPCS

## 2022-05-24 PROCEDURE — 6370000000 HC RX 637 (ALT 250 FOR IP): Performed by: ANESTHESIOLOGY

## 2022-05-24 PROCEDURE — 88304 TISSUE EXAM BY PATHOLOGIST: CPT

## 2022-05-24 PROCEDURE — 2709999900 HC NON-CHARGEABLE SUPPLY: Performed by: SURGERY

## 2022-05-24 PROCEDURE — 3700000001 HC ADD 15 MINUTES (ANESTHESIA): Performed by: SURGERY

## 2022-05-24 RX ORDER — PROPOFOL 10 MG/ML
INJECTION, EMULSION INTRAVENOUS PRN
Status: DISCONTINUED | OUTPATIENT
Start: 2022-05-24 | End: 2022-05-24 | Stop reason: SDUPTHER

## 2022-05-24 RX ORDER — SODIUM CHLORIDE, SODIUM LACTATE, POTASSIUM CHLORIDE, CALCIUM CHLORIDE 600; 310; 30; 20 MG/100ML; MG/100ML; MG/100ML; MG/100ML
INJECTION, SOLUTION INTRAVENOUS CONTINUOUS PRN
Status: DISCONTINUED | OUTPATIENT
Start: 2022-05-24 | End: 2022-05-24 | Stop reason: SDUPTHER

## 2022-05-24 RX ORDER — SODIUM CHLORIDE, SODIUM LACTATE, POTASSIUM CHLORIDE, CALCIUM CHLORIDE 600; 310; 30; 20 MG/100ML; MG/100ML; MG/100ML; MG/100ML
INJECTION, SOLUTION INTRAVENOUS CONTINUOUS
Status: DISCONTINUED | OUTPATIENT
Start: 2022-05-24 | End: 2022-05-24 | Stop reason: HOSPADM

## 2022-05-24 RX ORDER — LIDOCAINE HYDROCHLORIDE 10 MG/ML
INJECTION, SOLUTION EPIDURAL; INFILTRATION; INTRACAUDAL; PERINEURAL PRN
Status: DISCONTINUED | OUTPATIENT
Start: 2022-05-24 | End: 2022-05-24 | Stop reason: SDUPTHER

## 2022-05-24 RX ORDER — MIDAZOLAM HYDROCHLORIDE 1 MG/ML
INJECTION INTRAMUSCULAR; INTRAVENOUS PRN
Status: DISCONTINUED | OUTPATIENT
Start: 2022-05-24 | End: 2022-05-24 | Stop reason: SDUPTHER

## 2022-05-24 RX ORDER — DOCUSATE SODIUM 100 MG/1
100 CAPSULE, LIQUID FILLED ORAL DAILY
Qty: 30 CAPSULE | Refills: 0 | Status: SHIPPED | OUTPATIENT
Start: 2022-05-24

## 2022-05-24 RX ORDER — FENTANYL CITRATE 50 UG/ML
INJECTION, SOLUTION INTRAMUSCULAR; INTRAVENOUS PRN
Status: DISCONTINUED | OUTPATIENT
Start: 2022-05-24 | End: 2022-05-24 | Stop reason: SDUPTHER

## 2022-05-24 RX ORDER — SODIUM CHLORIDE, SODIUM LACTATE, POTASSIUM CHLORIDE, CALCIUM CHLORIDE 600; 310; 30; 20 MG/100ML; MG/100ML; MG/100ML; MG/100ML
INJECTION, SOLUTION INTRAVENOUS CONTINUOUS
Status: CANCELLED | OUTPATIENT
Start: 2022-05-24

## 2022-05-24 RX ORDER — BUPIVACAINE HYDROCHLORIDE AND EPINEPHRINE 5; 5 MG/ML; UG/ML
INJECTION, SOLUTION EPIDURAL; INTRACAUDAL; PERINEURAL PRN
Status: DISCONTINUED | OUTPATIENT
Start: 2022-05-24 | End: 2022-05-24 | Stop reason: HOSPADM

## 2022-05-24 RX ORDER — FENTANYL CITRATE 50 UG/ML
25 INJECTION, SOLUTION INTRAMUSCULAR; INTRAVENOUS EVERY 5 MIN PRN
Status: CANCELLED | OUTPATIENT
Start: 2022-05-24

## 2022-05-24 RX ORDER — ONDANSETRON 2 MG/ML
INJECTION INTRAMUSCULAR; INTRAVENOUS PRN
Status: DISCONTINUED | OUTPATIENT
Start: 2022-05-24 | End: 2022-05-24 | Stop reason: SDUPTHER

## 2022-05-24 RX ORDER — SODIUM CHLORIDE 0.9 % (FLUSH) 0.9 %
5-40 SYRINGE (ML) INJECTION EVERY 12 HOURS SCHEDULED
Status: CANCELLED | OUTPATIENT
Start: 2022-05-24

## 2022-05-24 RX ORDER — ONDANSETRON 4 MG/1
4 TABLET, FILM COATED ORAL EVERY 8 HOURS PRN
Qty: 30 TABLET | Refills: 0 | Status: SHIPPED | OUTPATIENT
Start: 2022-05-24 | End: 2022-07-07

## 2022-05-24 RX ORDER — NEOSTIGMINE METHYLSULFATE 5 MG/5 ML
SYRINGE (ML) INTRAVENOUS PRN
Status: DISCONTINUED | OUTPATIENT
Start: 2022-05-24 | End: 2022-05-24 | Stop reason: SDUPTHER

## 2022-05-24 RX ORDER — OXYCODONE HYDROCHLORIDE 5 MG/1
5 TABLET ORAL EVERY 6 HOURS PRN
Qty: 28 TABLET | Refills: 0 | Status: SHIPPED | OUTPATIENT
Start: 2022-05-24 | End: 2022-05-31

## 2022-05-24 RX ORDER — ONDANSETRON 2 MG/ML
4 INJECTION INTRAMUSCULAR; INTRAVENOUS
Status: CANCELLED | OUTPATIENT
Start: 2022-05-24 | End: 2022-05-24

## 2022-05-24 RX ORDER — MAGNESIUM HYDROXIDE 1200 MG/15ML
LIQUID ORAL CONTINUOUS PRN
Status: DISCONTINUED | OUTPATIENT
Start: 2022-05-24 | End: 2022-05-24 | Stop reason: HOSPADM

## 2022-05-24 RX ORDER — ONDANSETRON 4 MG/1
4 TABLET, FILM COATED ORAL EVERY 8 HOURS PRN
Qty: 30 TABLET | Refills: 0 | Status: SHIPPED | OUTPATIENT
Start: 2022-05-24 | End: 2022-05-24 | Stop reason: SDUPTHER

## 2022-05-24 RX ORDER — ROCURONIUM BROMIDE 10 MG/ML
INJECTION, SOLUTION INTRAVENOUS PRN
Status: DISCONTINUED | OUTPATIENT
Start: 2022-05-24 | End: 2022-05-24 | Stop reason: SDUPTHER

## 2022-05-24 RX ORDER — PROCHLORPERAZINE EDISYLATE 5 MG/ML
5 INJECTION INTRAMUSCULAR; INTRAVENOUS
Status: CANCELLED | OUTPATIENT
Start: 2022-05-24 | End: 2022-05-24

## 2022-05-24 RX ORDER — SODIUM CHLORIDE 9 MG/ML
INJECTION, SOLUTION INTRAVENOUS PRN
Status: DISCONTINUED | OUTPATIENT
Start: 2022-05-24 | End: 2022-05-24 | Stop reason: HOSPADM

## 2022-05-24 RX ORDER — GLYCOPYRROLATE 0.2 MG/ML
INJECTION INTRAMUSCULAR; INTRAVENOUS PRN
Status: DISCONTINUED | OUTPATIENT
Start: 2022-05-24 | End: 2022-05-24 | Stop reason: SDUPTHER

## 2022-05-24 RX ORDER — SODIUM CHLORIDE 0.9 % (FLUSH) 0.9 %
5-40 SYRINGE (ML) INJECTION PRN
Status: DISCONTINUED | OUTPATIENT
Start: 2022-05-24 | End: 2022-05-24 | Stop reason: HOSPADM

## 2022-05-24 RX ORDER — SODIUM CHLORIDE 0.9 % (FLUSH) 0.9 %
5-40 SYRINGE (ML) INJECTION EVERY 12 HOURS SCHEDULED
Status: DISCONTINUED | OUTPATIENT
Start: 2022-05-24 | End: 2022-05-24 | Stop reason: HOSPADM

## 2022-05-24 RX ORDER — DOCUSATE SODIUM 100 MG/1
100 CAPSULE, LIQUID FILLED ORAL DAILY
Qty: 30 CAPSULE | Refills: 0 | Status: SHIPPED | OUTPATIENT
Start: 2022-05-24 | End: 2022-05-24 | Stop reason: SDUPTHER

## 2022-05-24 RX ORDER — DEXAMETHASONE SODIUM PHOSPHATE 10 MG/ML
INJECTION INTRAMUSCULAR; INTRAVENOUS PRN
Status: DISCONTINUED | OUTPATIENT
Start: 2022-05-24 | End: 2022-05-24 | Stop reason: SDUPTHER

## 2022-05-24 RX ORDER — ACETAMINOPHEN 500 MG
1000 TABLET ORAL ONCE
Status: COMPLETED | OUTPATIENT
Start: 2022-05-24 | End: 2022-05-24

## 2022-05-24 RX ORDER — OXYCODONE HYDROCHLORIDE 5 MG/1
5 TABLET ORAL EVERY 6 HOURS PRN
Qty: 28 TABLET | Refills: 0 | Status: SHIPPED | OUTPATIENT
Start: 2022-05-24 | End: 2022-05-24 | Stop reason: SDUPTHER

## 2022-05-24 RX ADMIN — PROPOFOL 50 MG: 10 INJECTION, EMULSION INTRAVENOUS at 14:15

## 2022-05-24 RX ADMIN — Medication 2000 MG: at 14:16

## 2022-05-24 RX ADMIN — ROCURONIUM BROMIDE 30 MG: 10 INJECTION INTRAVENOUS at 14:05

## 2022-05-24 RX ADMIN — SODIUM CHLORIDE, POTASSIUM CHLORIDE, SODIUM LACTATE AND CALCIUM CHLORIDE: 600; 310; 30; 20 INJECTION, SOLUTION INTRAVENOUS at 14:01

## 2022-05-24 RX ADMIN — MIDAZOLAM HYDROCHLORIDE 2 MG: 1 INJECTION, SOLUTION INTRAMUSCULAR; INTRAVENOUS at 14:02

## 2022-05-24 RX ADMIN — GLYCOPYRROLATE 0.8 MG: 0.2 INJECTION INTRAMUSCULAR; INTRAVENOUS at 14:41

## 2022-05-24 RX ADMIN — LIDOCAINE HYDROCHLORIDE 50 MG: 10 INJECTION, SOLUTION EPIDURAL; INFILTRATION; INTRACAUDAL; PERINEURAL at 14:05

## 2022-05-24 RX ADMIN — ONDANSETRON 4 MG: 2 INJECTION INTRAMUSCULAR; INTRAVENOUS at 14:40

## 2022-05-24 RX ADMIN — PROPOFOL 150 MG: 10 INJECTION, EMULSION INTRAVENOUS at 14:05

## 2022-05-24 RX ADMIN — DEXAMETHASONE SODIUM PHOSPHATE 10 MG: 10 INJECTION INTRAMUSCULAR; INTRAVENOUS at 14:22

## 2022-05-24 RX ADMIN — FENTANYL CITRATE 50 MCG: 50 INJECTION, SOLUTION INTRAMUSCULAR; INTRAVENOUS at 14:15

## 2022-05-24 RX ADMIN — ACETAMINOPHEN 1000 MG: 500 TABLET ORAL at 13:05

## 2022-05-24 RX ADMIN — SODIUM CHLORIDE, POTASSIUM CHLORIDE, SODIUM LACTATE AND CALCIUM CHLORIDE: 600; 310; 30; 20 INJECTION, SOLUTION INTRAVENOUS at 13:03

## 2022-05-24 RX ADMIN — Medication 4 MG: at 14:41

## 2022-05-24 RX ADMIN — FENTANYL CITRATE 50 MCG: 50 INJECTION, SOLUTION INTRAMUSCULAR; INTRAVENOUS at 14:05

## 2022-05-24 ASSESSMENT — PAIN - FUNCTIONAL ASSESSMENT: PAIN_FUNCTIONAL_ASSESSMENT: NONE - DENIES PAIN

## 2022-05-24 NOTE — PROGRESS NOTES
Spoke to pt granddaughter Paul Whatley and she confirmed she will be able to  pt after procedure. Number is [phone].

## 2022-05-24 NOTE — ANESTHESIA POSTPROCEDURE EVALUATION
Department of Anesthesiology  Postprocedure Note    Patient: Leighton Gonzales  MRN: [id number]  YOB: 1965  Date of evaluation: 5/24/2022  Time:  3:41 PM     Procedure Summary     Date: 05/24/22 Room / Location: [address]    Anesthesia Start: 1401 Anesthesia Stop: 4982    Procedure: EXCISION OF BACK  SEBACEOUS CYST (N/A ) Diagnosis:       Hx of sebaceous cyst      (SEBACEOUS CYST)    Surgeons: Toña Meneses IV, DO Responsible Provider: Tomeka Mathews MD    Anesthesia Type: TIVA ASA Status: 2          Anesthesia Type: No value filed. Christiana Phase I: Christiana Score: 10    Christiana Phase II:      Last vitals: Reviewed and per EMR flowsheets.        Anesthesia Post Evaluation    Patient location during evaluation: PACU  Patient participation: complete - patient participated  Level of consciousness: awake and alert  Pain score: 0  Nausea & Vomiting: no nausea  Cardiovascular status: hemodynamically stable and hypertensive  Respiratory status: room air

## 2022-05-24 NOTE — H&P
file    Highest education level: Not on file   Occupational History    Not on file   Tobacco Use    Smoking status: Current Every Day Smoker     Packs/day: 0.50     Years: 0.00     Pack years: 0.00     Types: Cigarettes    Smokeless tobacco: Never Used   Vaping Use    Vaping Use: Never used   Substance and Sexual Activity    Alcohol use: Yes     Comment: social    Drug use: No    Sexual activity: Yes   Other Topics Concern    Not on file   Social History Narrative    Not on file     Social Determinants of Health     Financial Resource Strain: High Risk    Difficulty of Paying Living Expenses: Very hard   Food Insecurity: No Food Insecurity    Worried About Running Out of Food in the Last Year: Never true    Daniel of Food in the Last Year: Never true   Transportation Needs:     Lack of Transportation (Medical): Not on file    Lack of Transportation (Non-Medical):  Not on file   Physical Activity:     Days of Exercise per Week: Not on file    Minutes of Exercise per Session: Not on file   Stress:     Feeling of Stress : Not on file   Social Connections:     Frequency of Communication with Friends and Family: Not on file    Frequency of Social Gatherings with Friends and Family: Not on file    Attends Yazdanism Services: Not on file    Active Member of [address] [place] [address] [employer] or Organizations: Not on file    Attends Club or Organization Meetings: Not on file    Marital Status: Not on file   Intimate Partner Violence:     Fear of Current or Ex-Partner: Not on file    Emotionally Abused: Not on file    Physically Abused: Not on file    Sexually Abused: Not on file   Housing Stability:     Unable to Pay for Housing in the Last Year: Not on file    Number of [place] in the Last Year: Not on file    Unstable Housing in the Last Year: Not on file       Family History:       Problem Relation Age of Onset    High Blood Pressure Mother     Heart Disease Mother     High Blood Pressure Father     Heart Disease Father        REVIEW OF SYSTEMS:    CONSTITUTIONAL: Denies recent weight loss, fatigue, fevers, chills. HEENT: No rhinorrhea, dysphagia, odynphagia. CARDIOVASCULAR: No history of MI, recent chest pain. RESPIRATORY: Denies shortness of breath, COPD, asthma. GASTROINTESTINAL: No abdominal pain, nausea, vomiting  GENITOURINARY: Denies increased frequency or dysuria. HEMATOLOGIC/LYMPHATIC: Denies history of anemia or DVTs. ENDOCRINE: Denies history of thyroid problems or diabetes. NEUROLOGIC: Denies history of CVA, TIA. Review of systems negative unless listed above. PHYSICAL EXAM:    VITALS:  BP (!) 140/87   Pulse 56   Temp 97.5 °F (36.4 °C) (Temporal)   Resp 18   Ht 5' 8\" (1.727 m)   Wt 162 lb (73.5 kg)   LMP 02/28/2013   SpO2 98%   BMI 24.63 kg/m²   INTAKE/OUTPUT:   No intake or output data in the 24 hours ending 05/24/22 1357      General:A & O x3  HEENT:  NCAT, PERRL, EMOI, oral mucus membrane pink and moist  Lungs: no inc WOB   Heart: RRR  Abdomen: soft, nontender, no HSM, no guarding, no rebound, no masses  Extremity: Normal, without deformities, edema, or skin discoloration  Neuro: CN II-XII grossly intact  Skin: central portion of back with sebaceous cyst notes. Pore with no active drainage. Non tender and not currently infected. No erythema. About 3cm in diameter; lipoma of right anterior shoulder just under bra strap about 3.5cm, soft mobile nontender      ASSESSMENT:  3 65year old female with sebaceous cyst of back    Plan:  1.  Risks and benefits of procedure were discussed with patient, all questions answered and patient would like to proceed with excision of back cyst today      Electronically signed by Nate Gayle DO  on 5/24/2022 at 1:57 PM

## 2022-05-24 NOTE — OP NOTE
A 3-0 Monocryl was then used for a running subcuticular stitch. Dermabond was applied on top. The patient tolerated the procedure well. All sharps and instruments counts were correct. The patient was moved into the postoperative area. Dr. Mary Esquivel was present for the entire case.    Electronically signed by Ellis Zambrano DO on 5/24/2022 at 2:55 PM

## 2022-05-24 NOTE — ANESTHESIA PRE PROCEDURE
Department of Anesthesiology  Preprocedure Note       Name:  Toni White   Age:  65 y.o.  :  1965                                          MRN:  [id number]         Date:  2022      Surgeon: Tiffany Reyna):  Bobby Tanner IV, DO    Procedure: Procedure(s):  EXCISION OF BACK  SEBACEOUS CYST    Medications prior to admission:   Prior to Admission medications    Medication Sig Start Date End Date Taking? Authorizing Provider   oxyCODONE (ROXICODONE) 5 MG immediate release tablet Take 1 tablet by mouth every 6 hours as needed for Pain for up to 7 days. Intended supply: 7 days. Take lowest dose possible to manage pain 22 Yes Jose Danielle MD   ondansetron [facility]) 4 MG tablet Take 1 tablet by mouth every 8 hours as needed for Nausea or Vomiting 22  Yes Jose Danielle MD   docusate sodium (COLACE) 100 MG capsule Take 1 capsule by mouth daily 22  Yes Jose Danielle MD   naproxen (NAPROSYN) 500 MG tablet Take 1 tablet by mouth 2 times daily (with meals) 22   Sapna Jefferson MD   enalapril (VASOTEC) 20 MG tablet TAKE 1 TABLET BY MOUTH DAILY 22   CHARLINE Elliott - CNP       Current medications:    Current Facility-Administered Medications   Medication Dose Route Frequency Provider Last Rate Last Admin    ceFAZolin (ANCEF) 2000 mg in sterile water 20 mL IV syringe  2,000 mg IntraVENous Once [employer] IV, DO        lactated ringers infusion   IntraVENous Continuous Anthony Murphy  mL/hr at 22 1303 New Bag at 22 1303    sodium chloride flush 0.9 % injection 5-40 mL  5-40 mL IntraVENous 2 times per day Anthony Murphy MD        sodium chloride flush 0.9 % injection 5-40 mL  5-40 mL IntraVENous PRN Anthony Murphy MD        0.9 % sodium chloride infusion   IntraVENous PRN Anthony Murphy MD           Allergies:     Allergies   Allergen Reactions    Codeine Nausea And Vomiting       Problem List:    Patient Active Problem List   Diagnosis Code    Chronic pain of both knees M25.561, M25.562, G89.29    Chronic midline low back pain without sciatica M54.50, G89.29    Serrated adenoma of colon D12.6    Essential hypertension I10       Past Medical History:        Diagnosis Date    Chronic back pain     Essential hypertension 2/5/2020    Fibroid uterus     Post-menopausal bleeding     Sebaceous cyst     Under care of team 05/20/2022    PCP: Juanita STOLL, [facility], last visit 4/2022    UTI (urinary tract infection)        Past Surgical History:        Procedure Laterality Date    COLONOSCOPY N/A 3/27/2019    COLONOSCOPY WITH BIOPSY performed by [address] [address] [address], MD at [facility]    HYSTEROSCOPY  6/4/15    and D&C   [address]    vaginal warts removed    TONSILLECTOMY      at age 12   [address]       Social History:    Social History     Tobacco Use    Smoking status: Current Every Day Smoker     Packs/day: 0.50     Years: 0.00     Pack years: 0.00     Types: Cigarettes    Smokeless tobacco: Never Used   Substance Use Topics    Alcohol use: Yes     Comment: social                                Ready to quit: Not Answered  Counseling given: Not Answered      Vital Signs (Current):   Vitals:    05/20/22 1148 05/24/22 1227   BP:  (!) 140/87   Pulse:  56   Resp:  18   Temp:  97.5 °F (36.4 °C)   TempSrc:  Temporal   SpO2:  98%   Weight: 162 lb (73.5 kg) 162 lb (73.5 kg)   Height: 5' 8\" (1.727 m) 5' 8\" (1.727 m)                                              BP Readings from Last 3 Encounters:   05/24/22 (!) 140/87   05/11/22 (!) 141/85   05/11/22 133/83       NPO Status: Time of last liquid consumption: 1800                        Time of last solid consumption: 1800                        Date of last liquid consumption: 05/23/22                        Date of last solid food consumption: 05/23/22    BMI:   Wt Readings from Last 3 Encounters:   05/24/22 162 lb (73.5 kg)   05/11/22 163 lb (73.9 kg)   04/27/22 165 lb (74.8 kg)     Body mass index is 24.63 kg/m². CBC:   Lab Results   Component Value Date    WBC 7.3 07/10/2016    RBC 5.33 07/10/2016    RBC 4.68 04/28/2012    HGB 14.1 07/10/2016    HCT 40.8 07/10/2016    MCV 76.6 07/10/2016    RDW 14.9 07/10/2016     07/10/2016     04/28/2012       CMP:   Lab Results   Component Value Date     02/18/2019    K 3.8 02/18/2019     02/18/2019    CO2 28 02/18/2019    BUN 17 02/18/2019    CREATININE 0.89 05/24/2022    CREATININE 0.69 02/18/2019    GFRAA >60 02/18/2019    LABGLOM >60 05/24/2022    GLUCOSE 88 02/18/2019    GLUCOSE 87 04/28/2012    PROT 6.9 02/18/2019    CALCIUM 8.9 02/18/2019    BILITOT 0.47 02/18/2019    ALKPHOS 47 02/18/2019    AST 19 02/18/2019    ALT 10 02/18/2019       POC Tests:   Recent Labs     05/24/22  1245   POCGLU 78   POCBUN 17       Coags: No results found for: PROTIME, INR, APTT    HCG (If Applicable):   Lab Results   Component Value Date    PREGTESTUR negative 08/18/2021        ABGs: No results found for: PHART, PO2ART, JUM2ZHX, RUT9QMR, BEART, F5ZHARMG     Type & Screen (If Applicable):  No results found for: LABABO, LABRH    Drug/Infectious Status (If Applicable):  No results found for: HIV, HEPCAB    COVID-19 Screening (If Applicable):   Lab Results   Component Value Date    COVID19 Not Detected 07/08/2020           Anesthesia Evaluation  Patient summary reviewed and Nursing notes reviewed  Airway: Mallampati: I  TM distance: >3 FB   Neck ROM: full  Mouth opening: > = 3 FB   Dental:          Pulmonary: breath sounds clear to auscultation                             Cardiovascular:            Rhythm: regular  Rate: normal                    Neuro/Psych:               GI/Hepatic/Renal:             Endo/Other:                     Abdominal:       Abdomen: soft. Vascular:           Other Findings:           Anesthesia Plan      TIVA     ASA 2     (Reviewed all available relevant information, laboratory results and diagnostic tests.  Discussed risks , benefits and alternatives of anesthetic and sedation options. Possible need  for blood transfusions discussed. Pt / family given opportunity to ask questions. All questions answered. TIVA,   Possible GA  ETT/  LMA)  Induction: intravenous. Anesthetic plan and risks discussed with patient. Plan discussed with CRNA.     Attending anesthesiologist reviewed and agrees with Sharath Johnson MD   5/24/2022

## 2022-05-24 NOTE — BRIEF OP NOTE
Brief Postoperative Note      Patient: Adenike Walsh  YOB: 1965  MRN: [id number]    Date of Procedure: 5/24/2022    Pre-Op Diagnosis: SEBACEOUS CYST    Post-Op Diagnosis: Same       Procedure(s):  EXCISION OF BACK  SEBACEOUS CYST    Surgeon(s):  Niecy Tanner IV, DO    Assistant:  * No surgical staff found *    Anesthesia: General    Estimated Blood Loss (mL): Minimal    Complications: None    Specimens:   ID Type Source Tests Collected by Time Destination   A : BACK CYST Tissue Back SURGICAL PATHOLOGY Niecy Tanner IV, DO 5/24/2022 1429        Implants:  * No implants in log *      Drains: * No LDAs found *    Findings: Sebaceous cyst with capsule     Electronically signed by Moni Mandujano DO on 5/24/2022 at 2:56 PM

## 2022-05-24 NOTE — PROGRESS NOTES
Called granddaughter Radha Terrazas to let her know pt was out of surgery and to please come to recovery to go over discharge instructions and sign paper work. She stated she was on the way to the hospital and will be here shortly.

## 2022-05-25 LAB
EKG ATRIAL RATE: 55 BPM
EKG P AXIS: 63 DEGREES
EKG P-R INTERVAL: 146 MS
EKG Q-T INTERVAL: 480 MS
EKG QRS DURATION: 84 MS
EKG QTC CALCULATION (BAZETT): 459 MS
EKG R AXIS: 57 DEGREES
EKG T AXIS: 52 DEGREES
EKG VENTRICULAR RATE: 55 BPM

## 2022-05-25 PROCEDURE — 93010 ELECTROCARDIOGRAM REPORT: CPT | Performed by: INTERNAL MEDICINE

## 2022-05-26 LAB — SURGICAL PATHOLOGY REPORT: NORMAL

## 2022-06-07 ENCOUNTER — HOSPITAL ENCOUNTER (EMERGENCY)
Age: 57
Discharge: HOME | End: 2022-06-07
Attending: EMERGENCY MEDICINE
Payer: MEDICARE

## 2022-06-07 VITALS
OXYGEN SATURATION: 100 % | BODY MASS INDEX: 24.25 KG/M2 | TEMPERATURE: 97.2 F | SYSTOLIC BLOOD PRESSURE: 170 MMHG | DIASTOLIC BLOOD PRESSURE: 88 MMHG | HEIGHT: 68 IN | RESPIRATION RATE: 16 BRPM | HEART RATE: 58 BPM | WEIGHT: 160 LBS

## 2022-06-07 DIAGNOSIS — L76.34 POSTOPERATIVE SEROMA OF SKIN AFTER NON-DERMATOLOGIC PROCEDURE: Primary | ICD-10-CM

## 2022-06-07 PROCEDURE — 99282 EMERGENCY DEPT VISIT SF MDM: CPT

## 2022-06-07 ASSESSMENT — ENCOUNTER SYMPTOMS
SHORTNESS OF BREATH: 0
ABDOMINAL PAIN: 0
BACK PAIN: 1

## 2022-06-07 NOTE — ED NOTES
Pt ambulated to ED for wound check. Pt states she had a cyst removal on 5/24/22 in between her scapulae here at the surgery center. Pt did not have any problems, but noticed a couple days ago that it started to drain and she had pus on her shirt, and also noticed some swelling as if the cyst had came back larger. Pt states the wound site was covered in a dressing, but the fluid still came through. She has not been on any abx since the procedure and has been taking oxycodone for the pain. Pt is afebrile, VSS.       Malu Prasad, RN  06/07/22 9619

## 2022-06-07 NOTE — CONSULTS
General Surgery:  Consult Note        PATIENT NAME: Reynaldo Walsh   YOB: 1965    ADMISSION DATE: 6/7/2022  3:59 AM     Admitting Provider: Loyd Aschoff    Consulted Physician: Caren Neely DATE: 6/7/2022    Chief Complaint:  Back lesion drainage  Consult Regarding:  Back lesion drainage     HISTORY OF PRESENT ILLNESS:  The patient is a 65 y.o. female  who underwent excision of back sebaceous cyst on 5/24. She presents to the ED this AM with drainage from the site that started yesterday and has been soaking her clothing. She denies pain at the site, no fever chills. She states she was supposed to follow up in [facility] post operatively but she forgot the surgeons name and called a few different numbers who kept transferring her to different people. She has covered the site with guaze at home to help protect her clothing. Past Medical History:        Diagnosis Date    Chronic back pain     Essential hypertension 2/5/2020    Fibroid uterus     Post-menopausal bleeding     Sebaceous cyst     Under care of team 05/20/2022    PCP: Tej STOLL, [facility], last visit 4/2022    UTI (urinary tract infection)        Past Surgical History:        Procedure Laterality Date    COLONOSCOPY N/A 03/27/2019    COLONOSCOPY WITH BIOPSY performed by Nia Nagel MD at [facility]    EXCISION/BIOPSY N/A 05/24/2022    EXCISION OF BACK  SEBACEOUS CYST (N/A )    HYSTEROSCOPY  06/04/2015    and D&C    OTHER SURGICAL HISTORY  1984    vaginal warts removed    SKIN BIOPSY N/A 5/24/2022    EXCISION OF BACK  SEBACEOUS CYST performed by Issac Tanner IV, DO at [place] [address]      at age 12    [address]       Medications Prior to Admission:   Not in a hospital admission.     Allergies:  Codeine    Social History:   Social History     Socioeconomic History    Marital status: Single     Spouse name: Not on file    Number of children: Not on file    Years of education: Not on file Highest education level: Not on file   Occupational History    Not on file   Tobacco Use    Smoking status: Current Every Day Smoker     Packs/day: 0.50     Years: 0.00     Pack years: 0.00     Types: Cigarettes    Smokeless tobacco: Never Used   Vaping Use    Vaping Use: Never used   Substance and Sexual Activity    Alcohol use: Yes     Comment: social    Drug use: No    Sexual activity: Yes   Other Topics Concern    Not on file   Social History Narrative    Not on file     Social Determinants of Health     Financial Resource Strain: High Risk    Difficulty of Paying Living Expenses: Very hard   Food Insecurity: No Food Insecurity    Worried About Running Out of Food in the Last Year: Never true    Ran Out of Food in the Last Year: Never true   Transportation Needs:     Lack of Transportation (Medical): Not on file    Lack of Transportation (Non-Medical):  Not on file   Physical Activity:     Days of Exercise per Week: Not on file    Minutes of Exercise per Session: Not on file   Stress:     Feeling of Stress : Not on file   Social Connections:     Frequency of Communication with Friends and Family: Not on file    Frequency of Social Gatherings with Friends and Family: Not on file    Attends Religion Services: Not on file    Active Member of Clubs or Organizations: Not on file    Attends Club or Organization Meetings: Not on file    Marital Status: Not on file   Intimate Partner Violence:     Fear of Current or Ex-Partner: Not on file    Emotionally Abused: Not on file    Physically Abused: Not on file    Sexually Abused: Not on file   Housing Stability:     Unable to Pay for Housing in the Last Year: Not on file    Number of [place] in the Last Year: Not on file    Unstable Housing in the Last Year: Not on file       Family History:       Problem Relation Age of Onset    High Blood Pressure Mother     Heart Disease Mother     High Blood Pressure Father     Heart Disease Father        REVIEW OF SYSTEMS: CONSTITUTIONAL: Denies recent weight loss, fatigue, fevers, chills. HEENT: Denies rhinorrhea, dysphagia, odynphagia. CARDIOVASCULAR: Denies history of MI, recent chest pain. RESPIRATORY: Denies recent history of shortness of breath or history of PE. GASTROINTESTINAL: Denies n/v/d  GENITOURINARY: Denies increased frequency or dysuria. HEMATOLOGIC/LYMPHATIC: Denies history of anemia or DVTs. ENDOCRINE: Denies history of thyroid problems or diabetes. NEURO: Denies history of CVA, TIA. Review of systems negative unless listed above. PHYSICAL EXAM:    VITALS:  BP (!) 170/88   Pulse 58   Temp 97.2 °F (36.2 °C) (Oral)   Resp 16   Ht 5' 8\" (1.727 m)   Wt 160 lb (72.6 kg)   LMP 02/28/2013   SpO2 100%   BMI 24.33 kg/m²   INTAKE/OUTPUT:   No intake or output data in the 24 hours ending 06/07/22 0434    CONSTITUTIONAL:  awake, alert, not distressed and normal weight  HEENT: Normocephalic/atraumatic, without obvious abnormality. NECK:  Supple, symmetrical, trachea midline   CARDIOVASCULAR: Regular rate and rhythm   LUNGS: equal chest rise bilaterally   ABDOMEN: soft, nontender, nondistended. MUSCULOSKELETAL: Muscle strength intact in all extremities bilaterally. NEUROLOGIC: CN II- XII intact. Gross motor intact without focal weakness. SKIN: Back incision well healed except for small pinhole tract. Area under incision fluctuant, moderate amount of serous fluid expressed. No erythema, mildly tender to palpation   Orientation:   oriented to person, place, and time    No new labs    Pertinent Radiology:   No results found. ASSESSMENT:  S/p sebaceous cyst removal on 5/24 with Postoperative seroma       Plan: Moderate amount of serous fluid expressed from site. Patient educated this is post operative seroma and may reoccur and continue draining now that tract has formed. If drainage becomes purulent or have fever/chills please return to ED.   Guaze dressing placed to site to protect clothing  Patient instructed to wash the site with soap and water   Follow up in [facility] this Wednesday 6/8 for post op evaluation       Electronically signed by Bonifacio Luz DO  on 6/7/2022 at 4:34 AM     I have reviewed the resident's note and I have reviewed patient's chart. I have discussed the findings, established the care plan and recommendations with Resident.     Electronically signed by Leatha Tanner IV, DO on 6/7/22 at 9:09 AM EDT

## 2022-06-07 NOTE — ED PROVIDER NOTES
[facility] ED  Emergency Department Encounter  Emergency Medicine Resident     Pt Name: Juan Elizabeth  MRN: [id number]  [place] 1965  Date of evaluation: 6/7/22  PCP:  CHARLINE Castano CNP    CHIEF COMPLAINT       Chief Complaint   Patient presents with    Wound Check       HISTORY OFPRESENT ILLNESS  (Location/Symptom, Timing/Onset, Context/Setting, Quality, Duration, Modifying Factors,Severity.)      Kandace Walsh is a 65 y. o.yo female who presents with concern for postop issue. Patient states a few weeks ago she had a sebaceous cyst removed from her mid back with Dr. Kevin Man here. States she has not yet followed up for a postop appointment as she was unable to figure out what number to call to follow-up with. States yesterday she noticed the wound opened up and began draining some fluid. States it is also tender. She says she cannot see the wound so she is unsure if it is changed visually. Denies any fevers. Has not been taking any antibiotics. PAST MEDICAL / SURGICAL / SOCIAL / FAMILY HISTORY      has a past medical history of Chronic back pain, Essential hypertension, Fibroid uterus, Post-menopausal bleeding, Sebaceous cyst, Under care of team, and UTI (urinary tract infection). has a past surgical history that includes Tubal ligation (0706); other surgical history (1984); Tonsillectomy; hysteroscopy (06/04/2015); Colonoscopy (N/A, 03/27/2019); Excision/Biopsy (N/A, 05/24/2022); and skin biopsy (N/A, 5/24/2022).      Social History     Socioeconomic History    Marital status: Single     Spouse name: Not on file    Number of children: Not on file    Years of education: Not on file    Highest education level: Not on file   Occupational History    Not on file   Tobacco Use    Smoking status: Current Every Day Smoker     Packs/day: 0.50     Years: 0.00     Pack years: 0.00     Types: Cigarettes    Smokeless tobacco: Never Used   Vaping Use    Vaping Use: Never used Substance and Sexual Activity    Alcohol use: Yes     Comment: social    Drug use: No    Sexual activity: Yes   Other Topics Concern    Not on file   Social History Narrative    Not on file     Social Determinants of Health     Financial Resource Strain: High Risk    Difficulty of Paying Living Expenses: Very hard   Food Insecurity: No Food Insecurity    Worried About Running Out of Food in the Last Year: Never true    Daniel of Food in the Last Year: Never true   Transportation Needs:     Lack of Transportation (Medical): Not on file    Lack of Transportation (Non-Medical): Not on file   Physical Activity:     Days of Exercise per Week: Not on file    Minutes of Exercise per Session: Not on file   Stress:     Feeling of Stress : Not on file   Social Connections:     Frequency of Communication with Friends and Family: Not on file    Frequency of Social Gatherings with Friends and Family: Not on file    Attends Oriental orthodox Services: Not on file    Active Member of [address] [place] [address] or Organizations: Not on file    Attends Club or Organization Meetings: Not on file    Marital Status: Not on file   Intimate Partner Violence:     Fear of Current or Ex-Partner: Not on file    Emotionally Abused: Not on file    Physically Abused: Not on file    Sexually Abused: Not on file   Housing Stability:     Unable to Pay for Housing in the Last Year: Not on file    Number of [place] in the Last Year: Not on file    Unstable Housing in the Last Year: Not on file       Family History   Problem Relation Age of Onset    High Blood Pressure Mother     Heart Disease Mother     High Blood Pressure Father     Heart Disease Father         Allergies:  Codeine    Home Medications:  Prior to Admission medications    Medication Sig Start Date End Date Taking?  Authorizing Provider   ondansetron (ZOFRAN) 4 MG tablet Take 1 tablet by mouth every 8 hours as needed for Nausea or Vomiting 5/24/22   Zach Antonio MD   docusate sodium (COLACE) 100 MG capsule Take 1 capsule by mouth daily 5/24/22   Adis Bowie MD   naproxen (NAPROSYN) 500 MG tablet Take 1 tablet by mouth 2 times daily (with meals) 5/5/22   Evangelina Hoover MD   enalapril (VASOTEC) 20 MG tablet TAKE 1 TABLET BY MOUTH DAILY 5/2/22   Madalynn Area, APRN - CNP       REVIEW OFSYSTEMS    (2-9 systems for level 4, 10 or more for level 5)      Review of Systems   Constitutional: Negative for fever. HENT: Negative for congestion. Respiratory: Negative for shortness of breath. Cardiovascular: Negative for chest pain. Gastrointestinal: Negative for abdominal pain. Musculoskeletal: Positive for back pain. Skin: Positive for wound. Negative for rash. Neurological: Negative for headaches. Psychiatric/Behavioral: Negative for behavioral problems. PHYSICAL EXAM   (up to 7 for level 4, 8 or more forlevel 5)      INITIAL VITALS:   Vitals:    06/07/22 0357   BP: (!) 170/88   Pulse: 58   Resp: 16   Temp: 97.2 °F (36.2 °C)   TempSrc: Oral   SpO2: 100%   Weight: 160 lb (72.6 kg)   Height: 5' 8\" (1.727 m)         Physical Exam  Vitals reviewed. Constitutional:       General: She is not in acute distress. Appearance: Normal appearance. She is not ill-appearing. HENT:      Head: Normocephalic and atraumatic. Right Ear: External ear normal.      Left Ear: External ear normal.      Nose: Nose normal.   Eyes:      General:         Right eye: No discharge. Left eye: No discharge. Extraocular Movements: Extraocular movements intact. Cardiovascular:      Rate and Rhythm: Normal rate and regular rhythm. Pulmonary:      Effort: Pulmonary effort is normal. No respiratory distress. Abdominal:      General: There is no distension. Palpations: Abdomen is soft. Tenderness: There is no abdominal tenderness. Musculoskeletal:      Comments: Moving all 4 extremities   Skin:     Capillary Refill: Capillary refill takes less than 2 seconds. Comments: Well-healing incision on midline back. Surrounding swelling. Tender to palpation. No erythema. See media. Neurological:      General: No focal deficit present. Mental Status: She is alert and oriented to person, place, and time. Cranial Nerves: No cranial nerve deficit. Psychiatric:         Mood and Affect: Mood normal.         Behavior: Behavior normal.             DIFFERENTIAL  DIAGNOSIS     PLAN (LABS / IMAGING / EKG):  Orders Placed This Encounter   Procedures    Inpatient consult to General Surgery       MEDICATIONS ORDERED:  No orders of the defined types were placed in this encounter. DDX: Postop infection, normal postop course    Initial MDM/Plan: 65 y.o. female who presents with drainage from incision from cyst removal few weeks ago. Also having increasing pain at the site. Patient well-appearing initial evaluation, hypertensive but otherwise stable vital signs, afebrile. Does have area of swelling noted around incision, not actively draining, no erythema. We will reach out to general surgery and reassess. DIAGNOSTIC RESULTS / EMERGENCYDEPARTMENT COURSE / MDM     LABS:  Labs Reviewed - No data to display      RADIOLOGY:  No results found. EKG  None    All EKG's are interpreted by the Emergency Department Physicianwho either signs or Co-signs this chart in the absence of a cardiologist.    EMERGENCY DEPARTMENT COURSE:  ED Course as of 06/07/22 0755   Tue Jun 07, 2022   0424 Surgery down evaluating patient [AB]   0430 Surgery evaluated wound. Determined it was a seroma. Was able to drain a good amount of fluid from the wound with direct pressure. Would like patient to follow-up in their surgery clinic on Wednesday. [AB]   0454 Patient will be discharged at this time. Instructed to follow-up with the surgery clinic tomorrow. Given strict return precautions including if she develops any fevers, worsening pain, any other concerning symptoms.   Patient agreed with discharge plan at this time. [AB]      ED Course User Index  [AB] Julia Izaguirre DO          PROCEDURES:  None    CONSULTS:  IP CONSULT TO GENERAL SURGERY    CRITICAL CARE:  See attending physician note    FINAL IMPRESSION      1.  Postoperative seroma of skin after non-dermatologic procedure          DISPOSITION / PLAN     DISPOSITION Decision To Discharge 06/07/2022 04:55:06 AM      PATIENT REFERRED TO:  [facility] ED  [address] [facility]  [phone]  Go to   If symptoms worsen    CHARLINE Maurer [address] [facility]  [phone]    Schedule an appointment as soon as possible for a visit in 3 days      [address] [address] [phone]  Schedule an appointment as soon as possible for a visit in 1 day  For wound re-check      DISCHARGE MEDICATIONS:  Discharge Medication List as of 6/7/2022  4:55 AM          Shlomo Concepcion DO  Emergency Medicine Resident    (Please note that portions of this note were completed with a voice recognition program.Efforts were made to edit the dictations but occasionally words are mis-transcribed.)        Julia Izaguirre DO  Resident  06/07/22 5451

## 2022-06-07 NOTE — ED PROVIDER NOTES
[facility]     Emergency Department     Faculty Attestation    I performed a history and physical examination of the patient and discussed management with the resident. I have reviewed and agree with the residents findings including all diagnostic interpretations, and treatment plans as written. Any areas of disagreement are noted on the chart. I was personally present for the key portions of any procedures. I have documented in the chart those procedures where I was not present during the key portions. I have reviewed the emergency nurses triage note. I agree with the chief complaint, past medical history, past surgical history, allergies, medications, social and family history as documented unless otherwise noted below. Documentation of the HPI, Physical Exam and Medical Decision Making performed by scribarlene is based on my personal performance of the HPI, PE and MDM. For Physician Assistant/ Nurse Practitioner cases/documentation I have personally evaluated this patient and have completed at least one if not all key elements of the E/M (history, physical exam, and MDM). Additional findings are as noted. 66 yo F had cyst removal Dr Shira Mota 5/24, noted increase swelling, no fever or injury  pe sbp [address] RN escort for exam<   Mid thoracic tenderness with healed surgical site, mild fluctuance at site, no erythema,     Surgery consulted, & expressed seroma, pt given out pt follow up,     EKG Interpretation    Interpreted by me      CRITICAL CARE: There was a high probability of clinically significant/life threatening deterioration in this patient's condition which required my urgent intervention. Total critical care time was 0 minutes. This excludes any time for separately reportable procedures.        [facility] 24, DO  06/07/22 1411 [facility] [address] E, DO  06/07/22 2173

## 2022-06-08 ENCOUNTER — OFFICE VISIT (OUTPATIENT)
Dept: SURGERY | Age: 57
End: 2022-06-08
Payer: MEDICARE

## 2022-06-08 VITALS
BODY MASS INDEX: 24.49 KG/M2 | HEIGHT: 68 IN | DIASTOLIC BLOOD PRESSURE: 88 MMHG | TEMPERATURE: 97.3 F | WEIGHT: 161.6 LBS | HEART RATE: 65 BPM | SYSTOLIC BLOOD PRESSURE: 175 MMHG

## 2022-06-08 DIAGNOSIS — Z98.890 HX OF EXCISION OF EPIDERMAL INCLUSION CYST: Primary | ICD-10-CM

## 2022-06-08 DIAGNOSIS — Z87.2 HX OF EXCISION OF EPIDERMAL INCLUSION CYST: Primary | ICD-10-CM

## 2022-06-08 PROCEDURE — 99213 OFFICE O/P EST LOW 20 MIN: CPT | Performed by: STUDENT IN AN ORGANIZED HEALTH CARE EDUCATION/TRAINING PROGRAM

## 2022-06-08 PROCEDURE — 4004F PT TOBACCO SCREEN RCVD TLK: CPT | Performed by: STUDENT IN AN ORGANIZED HEALTH CARE EDUCATION/TRAINING PROGRAM

## 2022-06-08 PROCEDURE — 3017F COLORECTAL CA SCREEN DOC REV: CPT | Performed by: STUDENT IN AN ORGANIZED HEALTH CARE EDUCATION/TRAINING PROGRAM

## 2022-06-08 PROCEDURE — G8427 DOCREV CUR MEDS BY ELIG CLIN: HCPCS | Performed by: STUDENT IN AN ORGANIZED HEALTH CARE EDUCATION/TRAINING PROGRAM

## 2022-06-08 PROCEDURE — G8420 CALC BMI NORM PARAMETERS: HCPCS | Performed by: STUDENT IN AN ORGANIZED HEALTH CARE EDUCATION/TRAINING PROGRAM

## 2022-06-08 NOTE — PROGRESS NOTES
[facility]   PROGRESS NOTE    DATE: June 8, 2022      SUBJECTIVE:  Suly Ring is a 65 y.o. female who returns to the [facility] surgery clinic for wound check. Patient underwent excision of upper back cyst on 5/24/2022, pathology returning as benign epidermal cyst.  Patient did go to the ED early yesterday morning for drainage and was found to have a seroma. Today, patient reports mild tenderness, but relief after drainage. She denies CP, SOB, N/V, C/F. Continues to apply dressing to wound. Has no other complaints today. Past Medical History:   Diagnosis Date    Chronic back pain     Essential hypertension 2/5/2020    Fibroid uterus     Post-menopausal bleeding     Sebaceous cyst     Under care of team 05/20/2022    PCP: Becca STOLL, [facility], last visit 4/2022    UTI (urinary tract infection)        Past Surgical History:   Procedure Laterality Date    COLONOSCOPY N/A 03/27/2019    COLONOSCOPY WITH BIOPSY performed by Nataly Slade MD at [facility]    EXCISION/BIOPSY N/A 05/24/2022    EXCISION OF BACK  SEBACEOUS CYST (N/A )    HYSTEROSCOPY  06/04/2015    and D&C    OTHER SURGICAL HISTORY  1984    vaginal warts removed    SKIN BIOPSY N/A 5/24/2022    EXCISION OF BACK  SEBACEOUS CYST performed by Tequila Tanner IV, DO at [address] [place] [address]      at age 12   [address]       Current Outpatient Medications   Medication Sig Dispense Refill    ondansetron (ZOFRAN) 4 MG tablet Take 1 tablet by mouth every 8 hours as needed for Nausea or Vomiting 30 tablet 0    docusate sodium (COLACE) 100 MG capsule Take 1 capsule by mouth daily 30 capsule 0    naproxen (NAPROSYN) 500 MG tablet Take 1 tablet by mouth 2 times daily (with meals) 60 tablet 0    enalapril (VASOTEC) 20 MG tablet TAKE 1 TABLET BY MOUTH DAILY 30 tablet 3     No current facility-administered medications for this visit.        Allergies   Allergen Reactions    Codeine Nausea And Vomiting Family History   Problem Relation Age of Onset    High Blood Pressure Mother     Heart Disease Mother     High Blood Pressure Father     Heart Disease Father        Social History     Socioeconomic History    Marital status: Single     Spouse name: Not on file    Number of children: Not on file    Years of education: Not on file    Highest education level: Not on file   Occupational History    Not on file   Tobacco Use    Smoking status: Current Every Day Smoker     Packs/day: 0.50     Years: 0.00     Pack years: 0.00     Types: Cigarettes    Smokeless tobacco: Never Used   Vaping Use    Vaping Use: Never used   Substance and Sexual Activity    Alcohol use: Yes     Comment: social    Drug use: No    Sexual activity: Yes   Other Topics Concern    Not on file   Social History Narrative    Not on file     Social Determinants of Health     Financial Resource Strain: High Risk    Difficulty of Paying Living Expenses: Very hard   Food Insecurity: No Food Insecurity    Worried About Running Out of Food in the Last Year: Never true    Daniel of Food in the Last Year: Never true   Transportation Needs:     Lack of Transportation (Medical): Not on file    Lack of Transportation (Non-Medical):  Not on file   Physical Activity:     Days of Exercise per Week: Not on file    Minutes of Exercise per Session: Not on file   Stress:     Feeling of Stress : Not on file   Social Connections:     Frequency of Communication with Friends and Family: Not on file    Frequency of Social Gatherings with Friends and Family: Not on file    Attends Anabaptist Services: Not on file    Active Member of Clubs or Organizations: Not on file    Attends Club or Organization Meetings: Not on file    Marital Status: Not on file   Intimate Partner Violence:     Fear of Current or Ex-Partner: Not on file    Emotionally Abused: Not on file    Physically Abused: Not on file    Sexually Abused: Not on file   Housing Stability:     Unable to Pay for Housing in the Last Year: Not on file    Number of Places Lived in the Last Year: Not on file    Unstable Housing in the Last Year: Not on file       ROS:  GEN: Denies recent weight loss, fatigue, fevers, chills. HEENT: No rhinorrhea, dysphagia, odynphagia. CV: No history of MI, recent chest pain. RESP: Denies shortness of breath, COPD, asthma. GI: Denies abdominal pain, nausea, vomiting  : Denies increased frequency or dysuria. HEM[de-identified] Denies history of anemia or DVTs. ENDO: Denies history of thyroid problems or diabetes. NEURO: Denies history of CVA, TIA. Notes reviewed, and agree with documentation in pt's chart. PHYSICAL EXAM:  Vitals:    06/08/22 0949   BP: (!) 175/88   Pulse: 65   Temp:      GEN: Alert and oriented x 3. In no acute distress. Appears stated age. HEENT: PERRLA, EOMI  NECK: trachea midline  HEART: Regular rate and rhythm    LUNGS: symmetrical chest rise bilaterally  ABDOMEN: Soft, nondistended, no TTP  EXT: no cyanosis, clubbing or edema present    NEURO: no focal deficits, gross motor intact. SKIN: No rashes or nodules noted. Upper back with scar consistent with recent surgical history with small superficial opening in the center with minimal drainage. IMAGING:      LABS:  @LABRCNT*@      ASSESSMENT:  57F 2wk s/p excision epidermal cyst  Pathology: Ruptured epidermal cyst, 4.5 x 3.5 x 3.0 cm    PLAN:  1. Reviewed the patient chart and discussed the patient with Dr. Mary Esquivel. 2. Seroma appears resolved at this time, minimal drainage since being seen in the ED more than 24 hours ago. 3. Educated patient of postop course and that seroma is a common complication given the size of the cavity after large cyst excision. Advised her to keep the area covered with sterile gauze. Advised her that this could still recur but will resolve with time. 4. Follow-up in clinic in 2 weeks for wound check.         Maribel Sen DO  6/8/2022    Attending Physician Statement  I have discussed the case with Dr Isela Hu, including pertinent history and exam findings with the resident. I have seen and examined the patient and the key elements of the encounter have been performed by me. I agree with the assessment, plan and orders as documented by the resident.       Electronically signed by Luis Tanner IV, DO  on 6/22/2022 at 8:42 AM

## 2022-06-08 NOTE — PROGRESS NOTES
Visit Information    Have you changed or started any medications since your last visit including any over-the-counter medicines, vitamins, or herbal medicines? no   Have you stopped taking any of your medications? Is so, why? -  no  Are you having any side effects from any of your medications? - no    Have you seen any other physician or provider since your last visit?  no   Have you had any other diagnostic tests since your last visit?  no   Have you been seen in the emergency room and/or had an admission in a hospital since we last saw you?  no   Have you had your routine dental cleaning in the past 6 months?  no     Do you have an active MyChart account? If no, what is the barrier?   No: Declines    Patient Care Team:  CHARLINE Espinoza CNP as PCP - General (Family Medicine)  CHARLINE Espinoza CNP [facility]    Medical History Review  Past Medical, Family, and Social History reviewed and does contribute to the patient presenting condition    Health Maintenance   Topic Date Due    Hepatitis C screen  Never done    DTaP/Tdap/Td vaccine (1 - Tdap) Never done    Shingles vaccine (1 of 2) Never done    Colorectal Cancer Screen  03/27/2020    Flu vaccine (Season Ended) 12/22/2022 (Originally 9/1/2022)    Depression Screen  12/22/2022    Pneumococcal 0-64 years Vaccine (2 - PCV) 12/22/2022    Breast cancer screen  09/09/2023    Lipids  02/18/2024    Cervical cancer screen  07/15/2026    COVID-19 Vaccine  Completed    HIV screen  Completed    Hepatitis A vaccine  Aged Out    Hepatitis B vaccine  Aged Out    Hib vaccine  Aged Out    Meningococcal (ACWY) vaccine  Aged Out

## 2022-06-08 NOTE — PATIENT INSTRUCTIONS
Thank you letting us take care of you today. We hope that all your questions were addressed. If a question was overlooked or something else comes to mind after you return home, please call our office at [phone]. Follow-up in clinic in 2 weeks for postop check. If you need to cancel or change an appointment, surgery or procedure, please contact the office at [phone].

## 2022-06-22 ENCOUNTER — OFFICE VISIT (OUTPATIENT)
Dept: SURGERY | Age: 57
End: 2022-06-22
Payer: MEDICARE

## 2022-06-22 VITALS
TEMPERATURE: 97 F | HEIGHT: 68 IN | HEART RATE: 73 BPM | DIASTOLIC BLOOD PRESSURE: 85 MMHG | WEIGHT: 164.4 LBS | SYSTOLIC BLOOD PRESSURE: 138 MMHG | BODY MASS INDEX: 24.92 KG/M2

## 2022-06-22 DIAGNOSIS — L72.0 EPIDERMAL CYST: Primary | ICD-10-CM

## 2022-06-22 PROCEDURE — G8419 CALC BMI OUT NRM PARAM NOF/U: HCPCS | Performed by: STUDENT IN AN ORGANIZED HEALTH CARE EDUCATION/TRAINING PROGRAM

## 2022-06-22 PROCEDURE — 4004F PT TOBACCO SCREEN RCVD TLK: CPT | Performed by: STUDENT IN AN ORGANIZED HEALTH CARE EDUCATION/TRAINING PROGRAM

## 2022-06-22 PROCEDURE — 3017F COLORECTAL CA SCREEN DOC REV: CPT | Performed by: STUDENT IN AN ORGANIZED HEALTH CARE EDUCATION/TRAINING PROGRAM

## 2022-06-22 PROCEDURE — G8427 DOCREV CUR MEDS BY ELIG CLIN: HCPCS | Performed by: STUDENT IN AN ORGANIZED HEALTH CARE EDUCATION/TRAINING PROGRAM

## 2022-06-22 PROCEDURE — 99212 OFFICE O/P EST SF 10 MIN: CPT | Performed by: STUDENT IN AN ORGANIZED HEALTH CARE EDUCATION/TRAINING PROGRAM

## 2022-06-22 NOTE — PROGRESS NOTES
History and Physical  [facility]    Patient's Name/Date of Birth: Maral Reid / 1965 (65 y.o.)    Date: June 22, 2022     HPI: Pt is a 65 y.o. female who presents to [facility] for wound check following a cyst excision on 5/24/2022. Postoperative course was complicated by seroma and patient required drainage in the ED. Today she denies any tenderness, drainage, fever, chills, chest pain, shortness of breath. She does not have pain in the area of the incision and is very happy with how it turned out. Past Medical History:   Diagnosis Date    Chronic back pain     Essential hypertension 2/5/2020    Fibroid uterus     Post-menopausal bleeding     Sebaceous cyst     Under care of team 05/20/2022    PCP: Jacob STOLL, [facility], last visit 4/2022    UTI (urinary tract infection)        Past Surgical History:   Procedure Laterality Date    COLONOSCOPY N/A 03/27/2019    COLONOSCOPY WITH BIOPSY performed by Carlos Acosta MD at [facility]    EXCISION/BIOPSY N/A 05/24/2022    EXCISION OF BACK  SEBACEOUS CYST (N/A )    HYSTEROSCOPY  06/04/2015    and D&C    OTHER SURGICAL HISTORY  1984    vaginal warts removed    SKIN BIOPSY N/A 5/24/2022    EXCISION OF BACK  SEBACEOUS CYST performed by Carline Tanner IV, DO at [address]      at age 12   [address]       Current Outpatient Medications   Medication Sig Dispense Refill    ondansetron (ZOFRAN) 4 MG tablet Take 1 tablet by mouth every 8 hours as needed for Nausea or Vomiting 30 tablet 0    docusate sodium (COLACE) 100 MG capsule Take 1 capsule by mouth daily 30 capsule 0    naproxen (NAPROSYN) 500 MG tablet Take 1 tablet by mouth 2 times daily (with meals) 60 tablet 0    enalapril (VASOTEC) 20 MG tablet TAKE 1 TABLET BY MOUTH DAILY 30 tablet 3     No current facility-administered medications for this visit.        Allergies   Allergen Reactions    Codeine Nausea And Vomiting       Family History   Problem Relation Age of Onset    High Blood Pressure Mother     Heart Disease Mother     High Blood Pressure Father     Heart Disease Father        Social History     Socioeconomic History    Marital status: Single     Spouse name: Not on file    Number of children: Not on file    Years of education: Not on file    Highest education level: Not on file   Occupational History    Not on file   Tobacco Use    Smoking status: Current Every Day Smoker     Packs/day: 0.50     Years: 0.00     Pack years: 0.00     Types: Cigarettes    Smokeless tobacco: Never Used   Vaping Use    Vaping Use: Never used   Substance and Sexual Activity    Alcohol use: Yes     Comment: social    Drug use: No    Sexual activity: Yes   Other Topics Concern    Not on file   Social History Narrative    Not on file     Social Determinants of Health     Financial Resource Strain: High Risk    Difficulty of Paying Living Expenses: Very hard   Food Insecurity: No Food Insecurity    Worried About Running Out of Food in the Last Year: Never true    Daniel of Food in the Last Year: Never true   Transportation Needs:     Lack of Transportation (Medical): Not on file    Lack of Transportation (Non-Medical):  Not on file   Physical Activity:     Days of Exercise per Week: Not on file    Minutes of Exercise per Session: Not on file   Stress:     Feeling of Stress : Not on file   Social Connections:     Frequency of Communication with Friends and Family: Not on file    Frequency of Social Gatherings with Friends and Family: Not on file    Attends [employer] Services: Not on file    Active Member of Clubs or Organizations: Not on file    Attends Club or Organization Meetings: Not on file    Marital Status: Not on file   Intimate Partner Violence:     Fear of Current or Ex-Partner: Not on file    Emotionally Abused: Not on file    Physically Abused: Not on file    Sexually Abused: Not on file   Housing Stability:     Unable to Pay for Housing in the Last Year: Not on file    Number of Places Lived in the Last Year: Not on file    Unstable Housing in the Last Year: Not on file       ROS:   GEN: Denies recent weight loss, fatigue, fevers, chills. HEENT: No rhinorrhea, dysphagia, odynphagia. CV: No history of MI, recent chest pain. RESP: Denies shortness of breath, COPD, asthma. GI: Denies abdominal pain, nausea, vomiting  : Denies increased frequency or dysuria. HEM[de-identified] Denies history of anemia or DVTs. ENDO: Denies history of thyroid problems or diabetes. NEURO: Denies history of CVA, TIA. Notes reviewed, and agree with documentation in pt's chart. Physical Exam:  Vitals:    06/22/22 1119   BP: 138/85   Pulse: 73   Temp: 97 °F (36.1 °C)     GEN: Alert and oriented x 3. In no acute distress. Appears stated age. HEENT: PERRLA, EOMI  NECK: trachea midline  HEART: Regular rate and rhythm    LUNGS: symmetrical chest rise bilaterally  ABDOMEN: Soft, nondistended, no TTP  EXT: no cyanosis, clubbing or edema present    NEURO: no focal deficits, gross motor intact. SKIN: No rashes or nodules noted. Upper back with scar consistent with recent surgical history with small scab in center of incision. No surrounding erythema, tenderness, induration or drainage. Assessment      Diagnosis Orders   1. Epidermal cyst         Plan   Back incision is mostly healed with a small scab in the midline that will very soon fall off. Patient is healing well and does not need to return to clinic unless she has further symptoms. Nidhi Melendrez DO  6/22/2022    Attending Physician Statement  I have discussed the case with Dr Cindy Decker, including pertinent history and exam findings with the resident. I have seen and examined the patient and the key elements of the encounter have been performed by me. I agree with the assessment, plan and orders as documented by the resident.       Electronically signed by Carlos Sharma DO  on 6/29/2022 at 9:48 AM

## 2022-06-22 NOTE — PROGRESS NOTES
Visit Information    Have you changed or started any medications since your last visit including any over-the-counter medicines, vitamins, or herbal medicines? no   Have you stopped taking any of your medications? Is so, why? -  no  Are you having any side effects from any of your medications? - no    Have you seen any other physician or provider since your last visit?  no   Have you had any other diagnostic tests since your last visit?  no   Have you been seen in the emergency room and/or had an admission in a hospital since we last saw you?  no   Have you had your routine dental cleaning in the past 6 months?  no     Do you have an active MyChart account? If no, what is the barrier?   No: declined    Patient Care Team:  CHARLINE Patotn CNP as PCP - General (Family Medicine)  CHARLINE Patton CNP as PCP - [facility] Provider    Medical History Review  Past Medical, Family, and Social History reviewed and does not contribute to the patient presenting condition    Health Maintenance   Topic Date Due    Hepatitis C screen  Never done    DTaP/Tdap/Td vaccine (1 - Tdap) Never done    Shingles vaccine (1 of 2) Never done    Colorectal Cancer Screen  03/27/2020    Flu vaccine (Season Ended) 12/22/2022 (Originally 9/1/2022)    Depression Screen  12/22/2022    Pneumococcal 0-64 years Vaccine (2 - PCV) 12/22/2022    Breast cancer screen  09/09/2023    Lipids  02/18/2024    Cervical cancer screen  07/15/2026    COVID-19 Vaccine  Completed    HIV screen  Completed    Hepatitis A vaccine  Aged Out    Hepatitis B vaccine  Aged Out    Hib vaccine  Aged Out    Meningococcal (ACWY) vaccine  Aged Out

## 2022-06-22 NOTE — PATIENT INSTRUCTIONS
Thank you letting us take care of you today. We hope that all your questions were addressed. If a question was overlooked or something else comes to mind after you return home, please call our office at [phone]. If you need to cancel or change an appointment, surgery or procedure, please contact the office at [phone].

## 2022-07-07 ENCOUNTER — OFFICE VISIT (OUTPATIENT)
Dept: PRIMARY CARE CLINIC | Age: 57
End: 2022-07-07
Payer: MEDICARE

## 2022-07-07 VITALS
BODY MASS INDEX: 25.55 KG/M2 | SYSTOLIC BLOOD PRESSURE: 159 MMHG | HEART RATE: 63 BPM | DIASTOLIC BLOOD PRESSURE: 90 MMHG | WEIGHT: 168 LBS | OXYGEN SATURATION: 98 % | TEMPERATURE: 97.3 F

## 2022-07-07 DIAGNOSIS — Z12.11 COLON CANCER SCREENING: ICD-10-CM

## 2022-07-07 DIAGNOSIS — F17.200 CURRENT SMOKER ON SOME DAYS: ICD-10-CM

## 2022-07-07 DIAGNOSIS — Z12.31 ENCOUNTER FOR SCREENING MAMMOGRAM FOR MALIGNANT NEOPLASM OF BREAST: ICD-10-CM

## 2022-07-07 DIAGNOSIS — Z23 NEED FOR VACCINATION: ICD-10-CM

## 2022-07-07 DIAGNOSIS — Z86.010 HISTORY OF COLON POLYPS: ICD-10-CM

## 2022-07-07 DIAGNOSIS — I10 ESSENTIAL HYPERTENSION: Primary | ICD-10-CM

## 2022-07-07 PROCEDURE — 99214 OFFICE O/P EST MOD 30 MIN: CPT | Performed by: NURSE PRACTITIONER

## 2022-07-07 PROCEDURE — G8419 CALC BMI OUT NRM PARAM NOF/U: HCPCS | Performed by: NURSE PRACTITIONER

## 2022-07-07 PROCEDURE — 3017F COLORECTAL CA SCREEN DOC REV: CPT | Performed by: NURSE PRACTITIONER

## 2022-07-07 PROCEDURE — G8427 DOCREV CUR MEDS BY ELIG CLIN: HCPCS | Performed by: NURSE PRACTITIONER

## 2022-07-07 PROCEDURE — 4004F PT TOBACCO SCREEN RCVD TLK: CPT | Performed by: NURSE PRACTITIONER

## 2022-07-07 RX ORDER — ZOSTER VACCINE RECOMBINANT, ADJUVANTED 50 MCG/0.5
0.5 KIT INTRAMUSCULAR SEE ADMIN INSTRUCTIONS
Qty: 0.5 ML | Refills: 0 | Status: SHIPPED | OUTPATIENT
Start: 2022-07-07 | End: 2023-01-03

## 2022-07-07 RX ORDER — AMLODIPINE BESYLATE 5 MG/1
5 TABLET ORAL DAILY
Qty: 90 TABLET | Refills: 1 | Status: SHIPPED | OUTPATIENT
Start: 2022-07-07

## 2022-07-07 ASSESSMENT — ENCOUNTER SYMPTOMS
SINUS PAIN: 0
CHOKING: 0
TROUBLE SWALLOWING: 0
NAUSEA: 0
DIARRHEA: 0
BLOOD IN STOOL: 0
BACK PAIN: 0
VOMITING: 0
ABDOMINAL DISTENTION: 0
ABDOMINAL PAIN: 0
BLURRED VISION: 0
ORTHOPNEA: 0

## 2022-07-07 NOTE — PROGRESS NOTES
[facility]  [id number] [facility] [address]  Dept: [phone]  Dept Fax: [phone]    Patient Care Team:  CHARLINE Montiel CNP as PCP - General (Family Medicine)  CHARLINE Montiel CNP as PCP - [facility] Empaneled Provider    2022     Adia Walsh (:  [phone])BR a 65 y.o. female, here for evaluation of the following medical concerns:   Chief Complaint   Patient presents with    Hypertension     6M F/U           Is a current smoker, working in [employer]. Currently down to 5-10 a day. Taking enalapril for blood pressure, prefers to take in the evening        Hypertension  This is a chronic problem. The problem is unchanged. The problem is uncontrolled. Pertinent negatives include no anxiety, blurred vision, neck pain, orthopnea, palpitations or peripheral edema. Past treatments include ACE inhibitors. The current treatment provides mild improvement. .    Review of Systems   Constitutional: Negative for appetite change and diaphoresis. HENT: Negative for congestion, drooling, sinus pain and trouble swallowing. Eyes: Negative for blurred vision. Respiratory: Negative for choking. Cardiovascular: Negative for palpitations and orthopnea. Gastrointestinal: Negative for abdominal distention, abdominal pain, blood in stool, diarrhea, nausea and vomiting. Genitourinary: Negative for dysuria, flank pain, hematuria, pelvic pain and vaginal bleeding. Musculoskeletal: Negative for arthralgias, back pain, gait problem, neck pain and neck stiffness. Neurological: Negative for dizziness, tremors, weakness and numbness. Prior to Visit Medications    Medication Sig Taking?  Authorizing Provider   zoster recombinant adjuvanted vaccine [facility]) 50 MCG/0.5ML SUSR injection Inject 0.5 mLs into the muscle See Admin Instructions 1 dose now and repeat in 2-6 months Yes CHARLINE Montiel CNP amLODIPine (NORVASC) 5 MG tablet Take 1 tablet by mouth daily Yes CHARLINE Mccarthy CNP   docusate sodium (COLACE) 100 MG capsule Take 1 capsule by mouth daily Yes Libby Jones MD   naproxen (NAPROSYN) 500 MG tablet Take 1 tablet by mouth 2 times daily (with meals) Yes Penny Stern MD   enalapril (VASOTEC) 20 MG tablet TAKE 1 TABLET BY MOUTH DAILY Yes CHARLINE Mccarthy CNP        Social History     Tobacco Use    Smoking status: Current Every Day Smoker     Packs/day: 0.50     Years: 0.00     Pack years: 0.00     Types: Cigarettes    Smokeless tobacco: Never Used   Substance Use Topics    Alcohol use: Yes     Comment: social        Vitals:    07/07/22 0956 07/07/22 1033   BP: (!) 174/83 (!) 159/90   Site: Left Upper Arm    Position: Sitting    Pulse: 64 63   Temp: 97.3 °F (36.3 °C)    TempSrc: Infrared    SpO2: 98%    Weight: 168 lb (76.2 kg)      Estimated body mass index is 25.55 kg/m² as calculated from the following:    Height as of 6/22/22: 5' 7.99\" (1.727 m). Weight as of this encounter: 168 lb (76.2 kg). DIAGNOSTIC FINDINGS:  CBC:  Lab Results   Component Value Date/Time    WBC 7.3 07/10/2016 01:52 PM    HGB 14.1 07/10/2016 01:52 PM     07/10/2016 01:52 PM     04/28/2012 02:47 PM       BMP:    Lab Results   Component Value Date/Time     02/18/2019 10:34 AM    K 3.8 02/18/2019 10:34 AM     02/18/2019 10:34 AM    CO2 28 02/18/2019 10:34 AM    BUN 17 02/18/2019 10:34 AM    CREATININE 0.89 05/24/2022 12:45 PM    CREATININE 0.69 02/18/2019 10:34 AM    GLUCOSE 88 02/18/2019 10:34 AM    GLUCOSE 87 04/28/2012 02:47 PM       HEMOGLOBIN A1C: No results found for: LABA1C    FASTING LIPID PANEL:  Lab Results   Component Value Date    CHOL 185 04/28/2012     02/18/2019    TRIG 44 04/28/2012       Physical Exam  Constitutional:       Appearance: Normal appearance. She is not toxic-appearing. HENT:      Head: Normocephalic.       Right Ear: External ear normal. Left Ear: External ear normal.      Nose: Nose normal.      Mouth/Throat:      Mouth: Mucous membranes are moist.   Eyes:      General: No scleral icterus. Right eye: No discharge. Left eye: No discharge. Conjunctiva/sclera: Conjunctivae normal.   Cardiovascular:      Rate and Rhythm: Normal rate and regular rhythm. Pulmonary:      Effort: Pulmonary effort is normal.      Breath sounds: Normal breath sounds. Musculoskeletal:         General: Normal range of motion. Cervical back: Normal range of motion and neck supple. Right lower leg: No edema. Left lower leg: No edema. Skin:     Coloration: Skin is not jaundiced. Neurological:      Mental Status: She is alert and oriented to person, place, and time. Psychiatric:         Mood and Affect: Mood normal.         Behavior: Behavior normal.         Thought Content: Thought content normal.         ASSESSMENT     Diagnosis Orders   1. Essential hypertension  amLODIPine (NORVASC) 5 MG tablet   2. Current smoker on some days     3. Encounter for screening mammogram for malignant neoplasm of breast  STEPHANIE DIGITAL SCREEN W OR WO CAD BILATERAL   4. Colon cancer screening  Tiffanie Cardenas MD, Gastroenterology, [place]   5. History of colon polyps  Tiffanie Cardenas MD, Gastroenterology, [place]   6. Need for vaccination  zoster recombinant adjuvanted vaccine [facility]) 50 MCG/0.5ML SUSR injection          PLAN:  Orders Placed This Encounter   Medications    zoster recombinant adjuvanted vaccine (SHINGRIX) 50 MCG/0.5ML SUSR injection     Sig: Inject 0.5 mLs into the muscle See Admin Instructions 1 dose now and repeat in 2-6 months     Dispense:  0.5 mL     Refill:  0    amLODIPine (NORVASC) 5 MG tablet     Sig: Take 1 tablet by mouth daily     Dispense:  90 tablet     Refill:  1         1.  Unfortunately blood pressure remains uncontrolled, discussed treatment options and patient agreeable to amlodipine daily in addition to enalapril at this time. ADRs reviewed  2. Overdue for colonoscopy, did have initial in 2019 with polyp removal and poor prep. Was due back in 2020, likely displaced due to [place] outbreak. Patient encouraged to reschedule due to polyp removal, new referral placed to GI today. FOLLOW UP AND INSTRUCTIONS:  Return in about 6 months (around 1/7/2023) for HTN. · Nicy received counseling on the following healthy behaviors:medication adherence and tobacco cessation    · Discussed use, benefit, and side effects of prescribed medications. Barriers to  medication compliance addressed. All patient questions answered. Pt  verbalized understanding of all instructions given. · Patient given educational materials - see patient instructions      · Patient advised to contact scheduling offices for any referrals or imaging orders  placed today if they have not been contacted in 48 hours. Return in about 6 months (around 1/7/2023) for HTN. An electronic signature was used to authenticate this note. --CHARLINE Luna CNP on 7/7/2022 at 10:59 AM  Visit Information    Have you changed or started any medications since your last visit including any over-the-counter medicines, vitamins, or herbal medicines? no   Are you having any side effects from any of your medications? -  no  Have you stopped taking any of your medications? Is so, why? -  no    Have you seen any other physician or provider since your last visit? Yes  Have you had any other diagnostic tests since your last visit? Yes  Have you been seen in the emergency room and/or had an admission to a hospital since we last saw you? No  Have you had your routine dental cleaning in the past 6 months? no    Have you activated your [employer] account? If not, what are your barriers?  No: Declined     Patient Care Team:  CHARLINE Luna CNP as PCP - General (Family Medicine)  CHARLINE Luna CNP as PCP - [address] [place] [address] Provider    Medical History Review  Past Medical, Family, and Social History reviewed and does not contribute to the patient presenting condition    Health Maintenance   Topic Date Due    Hepatitis C screen  Never done    DTaP/Tdap/Td vaccine (1 - Tdap) Never done    Diabetes screen  Never done    Shingles vaccine (1 of 2) Never done    Colorectal Cancer Screen  03/27/2020    Flu vaccine (1) 09/01/2022    Depression Screen  12/22/2022    Pneumococcal 0-64 years Vaccine (2 - PCV) 12/22/2022    Breast cancer screen  09/09/2023    Lipids  02/18/2024    Cervical cancer screen  07/15/2026    COVID-19 Vaccine  Completed    HIV screen  Completed    Hepatitis A vaccine  Aged Out    Hepatitis B vaccine  Aged Out    Hib vaccine  Aged Out    Meningococcal (ACWY) vaccine  Aged Out

## 2022-07-11 ENCOUNTER — TELEPHONE (OUTPATIENT)
Dept: GASTROENTEROLOGY | Age: 57
End: 2022-07-11

## 2022-07-11 DIAGNOSIS — D12.6 SERRATED ADENOMA OF COLON: Primary | ICD-10-CM

## 2022-07-11 RX ORDER — POLYETHYLENE GLYCOL 3350 17 G/17G
17 POWDER, FOR SOLUTION ORAL DAILY
Qty: 119 G | Refills: 0 | Status: ON HOLD | OUTPATIENT
Start: 2022-08-04 | End: 2022-08-11 | Stop reason: ALTCHOICE

## 2022-07-11 RX ORDER — BISACODYL 5 MG
TABLET, DELAYED RELEASE (ENTERIC COATED) ORAL
Qty: 4 TABLET | Refills: 0 | Status: ON HOLD | OUTPATIENT
Start: 2022-07-11 | End: 2022-08-11 | Stop reason: ALTCHOICE

## 2022-07-11 RX ORDER — POLYETHYLENE GLYCOL 3350, SODIUM SULFATE ANHYDROUS, SODIUM BICARBONATE, SODIUM CHLORIDE, POTASSIUM CHLORIDE 236; 22.74; 6.74; 5.86; 2.97 G/4L; G/4L; G/4L; G/4L; G/4L
POWDER, FOR SOLUTION ORAL
Qty: 4000 ML | Refills: 0 | Status: ON HOLD | OUTPATIENT
Start: 2022-07-11 | End: 2022-08-11 | Stop reason: ALTCHOICE

## 2022-07-11 NOTE — TELEPHONE ENCOUNTER
Writer rec'd referral from PCP to schedule pt for colon. Writer notes this is a [place] pt and her last colon was 3/27/19 with a recall of 1 year due to polyps/poor prep, pt states she has not had colon since that date. Per colon screen questionnaire pt cam be scheduled for colon. [facility] Thursday [email or web address] colon recall 2 day CLD/[email or web address]/[email or web address] x 7 days/[email or web address].     Writer reviewed dates/times of PAT/procedure with pt, pt voices understanding. Reviewed bowel prep instructions with patient over phone and mailed to home address.

## 2022-07-29 ENCOUNTER — HOSPITAL ENCOUNTER (OUTPATIENT)
Dept: PREADMISSION TESTING | Age: 57
Discharge: HOME | End: 2022-08-02

## 2022-07-29 VITALS — WEIGHT: 165 LBS | BODY MASS INDEX: 25.01 KG/M2 | HEIGHT: 68 IN

## 2022-07-29 NOTE — PROGRESS NOTES

## 2022-08-10 ENCOUNTER — ANESTHESIA EVENT (OUTPATIENT)
Dept: ENDOSCOPY | Age: 57
End: 2022-08-10
Payer: MEDICARE

## 2022-08-11 ENCOUNTER — ANESTHESIA (OUTPATIENT)
Dept: ENDOSCOPY | Age: 57
End: 2022-08-11
Payer: MEDICARE

## 2022-08-11 ENCOUNTER — HOSPITAL ENCOUNTER (OUTPATIENT)
Age: 57
Setting detail: OUTPATIENT SURGERY
Discharge: HOME | End: 2022-08-11
Attending: INTERNAL MEDICINE | Admitting: INTERNAL MEDICINE
Payer: MEDICARE

## 2022-08-11 VITALS
SYSTOLIC BLOOD PRESSURE: 161 MMHG | BODY MASS INDEX: 25.01 KG/M2 | WEIGHT: 165 LBS | HEART RATE: 53 BPM | TEMPERATURE: 97.5 F | HEIGHT: 68 IN | DIASTOLIC BLOOD PRESSURE: 87 MMHG | RESPIRATION RATE: 20 BRPM | OXYGEN SATURATION: 100 %

## 2022-08-11 DIAGNOSIS — Z86.010 HISTORY OF COLON POLYPS: ICD-10-CM

## 2022-08-11 PROCEDURE — 7100000011 HC PHASE II RECOVERY - ADDTL 15 MIN: Performed by: INTERNAL MEDICINE

## 2022-08-11 PROCEDURE — 2580000003 HC RX 258: Performed by: ANESTHESIOLOGY

## 2022-08-11 PROCEDURE — 88305 TISSUE EXAM BY PATHOLOGIST: CPT

## 2022-08-11 PROCEDURE — 7100000010 HC PHASE II RECOVERY - FIRST 15 MIN: Performed by: INTERNAL MEDICINE

## 2022-08-11 PROCEDURE — 3700000000 HC ANESTHESIA ATTENDED CARE: Performed by: INTERNAL MEDICINE

## 2022-08-11 PROCEDURE — 2709999900 HC NON-CHARGEABLE SUPPLY: Performed by: INTERNAL MEDICINE

## 2022-08-11 PROCEDURE — 6360000002 HC RX W HCPCS: Performed by: NURSE ANESTHETIST, CERTIFIED REGISTERED

## 2022-08-11 PROCEDURE — 45385 COLONOSCOPY W/LESION REMOVAL: CPT | Performed by: INTERNAL MEDICINE

## 2022-08-11 PROCEDURE — 3700000001 HC ADD 15 MINUTES (ANESTHESIA): Performed by: INTERNAL MEDICINE

## 2022-08-11 PROCEDURE — 2500000003 HC RX 250 WO HCPCS: Performed by: NURSE ANESTHETIST, CERTIFIED REGISTERED

## 2022-08-11 PROCEDURE — 3609010600 HC COLONOSCOPY POLYPECTOMY SNARE/COLD BIOPSY: Performed by: INTERNAL MEDICINE

## 2022-08-11 RX ORDER — SODIUM CHLORIDE 0.9 % (FLUSH) 0.9 %
5-40 SYRINGE (ML) INJECTION PRN
Status: DISCONTINUED | OUTPATIENT
Start: 2022-08-11 | End: 2022-08-11 | Stop reason: HOSPADM

## 2022-08-11 RX ORDER — SODIUM CHLORIDE 9 MG/ML
INJECTION, SOLUTION INTRAVENOUS PRN
Status: DISCONTINUED | OUTPATIENT
Start: 2022-08-11 | End: 2022-08-11 | Stop reason: HOSPADM

## 2022-08-11 RX ORDER — PROPOFOL 10 MG/ML
INJECTION, EMULSION INTRAVENOUS PRN
Status: DISCONTINUED | OUTPATIENT
Start: 2022-08-11 | End: 2022-08-11 | Stop reason: SDUPTHER

## 2022-08-11 RX ORDER — ONDANSETRON 2 MG/ML
4 INJECTION INTRAMUSCULAR; INTRAVENOUS
Status: DISCONTINUED | OUTPATIENT
Start: 2022-08-11 | End: 2022-08-11 | Stop reason: HOSPADM

## 2022-08-11 RX ORDER — LIDOCAINE HYDROCHLORIDE 10 MG/ML
INJECTION, SOLUTION INFILTRATION; PERINEURAL PRN
Status: DISCONTINUED | OUTPATIENT
Start: 2022-08-11 | End: 2022-08-11 | Stop reason: SDUPTHER

## 2022-08-11 RX ORDER — SODIUM CHLORIDE 0.9 % (FLUSH) 0.9 %
5-40 SYRINGE (ML) INJECTION EVERY 12 HOURS SCHEDULED
Status: DISCONTINUED | OUTPATIENT
Start: 2022-08-11 | End: 2022-08-11 | Stop reason: HOSPADM

## 2022-08-11 RX ORDER — DIPHENHYDRAMINE HYDROCHLORIDE 50 MG/ML
12.5 INJECTION INTRAMUSCULAR; INTRAVENOUS
Status: DISCONTINUED | OUTPATIENT
Start: 2022-08-11 | End: 2022-08-11 | Stop reason: HOSPADM

## 2022-08-11 RX ORDER — METOCLOPRAMIDE HYDROCHLORIDE 5 MG/ML
10 INJECTION INTRAMUSCULAR; INTRAVENOUS
Status: DISCONTINUED | OUTPATIENT
Start: 2022-08-11 | End: 2022-08-11 | Stop reason: HOSPADM

## 2022-08-11 RX ORDER — SODIUM CHLORIDE, SODIUM LACTATE, POTASSIUM CHLORIDE, CALCIUM CHLORIDE 600; 310; 30; 20 MG/100ML; MG/100ML; MG/100ML; MG/100ML
INJECTION, SOLUTION INTRAVENOUS CONTINUOUS
Status: DISCONTINUED | OUTPATIENT
Start: 2022-08-11 | End: 2022-08-11 | Stop reason: HOSPADM

## 2022-08-11 RX ORDER — FENTANYL CITRATE 50 UG/ML
25 INJECTION, SOLUTION INTRAMUSCULAR; INTRAVENOUS EVERY 5 MIN PRN
Status: DISCONTINUED | OUTPATIENT
Start: 2022-08-11 | End: 2022-08-11 | Stop reason: HOSPADM

## 2022-08-11 RX ORDER — LIDOCAINE HYDROCHLORIDE 10 MG/ML
1 INJECTION, SOLUTION EPIDURAL; INFILTRATION; INTRACAUDAL; PERINEURAL
Status: DISCONTINUED | OUTPATIENT
Start: 2022-08-11 | End: 2022-08-11 | Stop reason: HOSPADM

## 2022-08-11 RX ADMIN — LIDOCAINE HYDROCHLORIDE 40 MG: 10 INJECTION, SOLUTION EPIDURAL; INFILTRATION; INTRACAUDAL; PERINEURAL at 08:18

## 2022-08-11 RX ADMIN — SODIUM CHLORIDE, POTASSIUM CHLORIDE, SODIUM LACTATE AND CALCIUM CHLORIDE: 600; 310; 30; 20 INJECTION, SOLUTION INTRAVENOUS at 06:30

## 2022-08-11 RX ADMIN — PROPOFOL 400 MG: 10 INJECTION, EMULSION INTRAVENOUS at 08:20

## 2022-08-11 ASSESSMENT — ENCOUNTER SYMPTOMS
SINUS PAIN: 0
TROUBLE SWALLOWING: 0
BACK PAIN: 0
SINUS PRESSURE: 0
APNEA: 0
COUGH: 0
SHORTNESS OF BREATH: 0
SORE THROAT: 0
WHEEZING: 0
CHEST TIGHTNESS: 0
RHINORRHEA: 0

## 2022-08-11 ASSESSMENT — PAIN - FUNCTIONAL ASSESSMENT: PAIN_FUNCTIONAL_ASSESSMENT: 0-10

## 2022-08-11 NOTE — DISCHARGE INSTRUCTIONS
Colonoscopy: What to Expect at [address] [place] [address]  After you have a colonoscopy, you will stay at the clinic for 1 to 2 hours until the medicines wear off. Then you can go home, but you will need to arrange for a ride. Your doctor will tell you when you can eat and do your other usual activities. Your doctor will talk to you about when you will need your next colonoscopy. The results of your test and your risk for colorectal cancer will help your doctor decide how often you need to be checked. After the test, you may be bloated or have gas pains. You may need to pass gas. If a biopsy was done or a polyp was removed, you may have streaks of blood in your stool (feces) for a few days. This care sheet gives you a general idea about how long it will take for you to recover. But each person recovers at a different pace. Follow the steps below to get better as quickly as possible. How can you care for yourself at home? Activity  Rest as much as you need to after you go home. You should be able to go back to your usual activities the day after the test.  Diet  Follow your doctors directions for eating. Drink plenty of fluids (unless your doctor has told you not to) to replace the fluids that were lost during the colon prep. Do not drink alcohol. Medicines  If polyps were removed or a biopsy was done during the test, your doctor may tell you not to take aspirin or other anti-inflammatory medicines, such as ibuprofen (Advil, Motrin) and naproxen (Aleve), for a few days. Other instructions  For your safety, you should not drive or operate machinery until the medicine effects are gone and you can think clearly. Your doctor may tell you not to drive or operate machinery until the day after your test.  Do not sign legal documents or make major decisions until the medicine effects are gone and you can think clearly.  The anesthesia medicine can make it hard for you to fully understand what you are agreeing to.  Follow-up care is a key part of your treatment and safety. Be sure to make and go to all appointments, and call your doctor if you are having problems. It's also a good idea to know your test results and keep a list of the medicines you take. Call your Doctor if you have any of the following:             Passing blood rectally or vomiting blood (it may be red or black). Persistent nausea or vomiting. Severe abdominal or chest pain, not relieved by passing gas. Fever of 100 or more, chills or excessive sweating. Redness or swelling at the IV site. If you experience shortness of breath or severe chest pain, call 911. Where can you learn more? Go to https://[employer]. org and sign in to your [employer] account. Enter E264 in the [employer] box to learn more about Colonoscopy: What to Expect at Home.     If you do not have an account, please click on the Sign Up Now link. © 6266-0069 Healthwise, [employer]. Care instructions adapted under license by [facility]. This care instruction is for use with your licensed healthcare professional. If you have questions about a medical condition or this instruction, always ask your healthcare professional. [place] [address] any warranty or liability for your use of this information. Content Version: 3.1.357810; Last Revised: February 20, 2013           Colon Polypectomy   (Colon Polyp Removal)       Definition   A colon polypectomy is the removal of polyps from the inside lining of the colon (large intestine). A polyp is a mass of tissue. Some types of polyps have the potential to develop into cancer. Most polyps can be removed during a colonoscopy or sigmoidoscopy . A Colon Polyp        2011 [address] [place] [address].   Reasons for Procedure   The purpose of the surgery is to remove a polyp. It is done for cancer prevention.    In rare cases, larger polyps can cause troublesome symptoms, such as rectal bleeding, abdominal pain, and bowel irregularities. A polyp removal will relieve these symptoms. Possible Complications   Complications are rare, but no procedure is completely free of risk. If you are planning to have a polypectomy, your doctor will review a list of possible complications, which may include:   Damage to the colon wall   Bleeding   Infection   Adverse reaction to the sedative   Factors that may increase the risk of complications include:   Type, size, and location of the polyp   Patient factors, such as blood-clotting disorders, substance abuse, or other diseases (eg, obesity , diabetes )   What to Expect   Prior to Procedure    Your doctor will likely do the following:   Physical exam and health history   Review of medicines   Test your stool for hidden blood (called \"occult blood\")   X-rays an exam that uses small amounts of radiation to make a picture of the inside of the body   Barium enema x-ray exam that uses contrast to help better see the colon   Diagnostic colonoscopy or sigmoidoscopyexamination of the inside of the intestine with an endoscope   Your colon must be completely cleaned before the procedure. Any stool left in the intestine will block the view. This preparation may start several days before the procedure. Follow your doctor's instructions, which may include any of the following cleansing methods:   Enemas fluid introduced into the rectum to stimulate a bowel movement   Laxativesmedicines that cause you to have soft bowel movements   A clear-liquid diet   Oral cathartic medicinesa large container of fluid to drink, which stimulates a bowel movement   Leading up to your procedure:   Talk to your doctor about your medicines.  You may be asked to stop taking some medicines up to one week before the procedure, like:   Anti-inflammatory drugs (eg, aspirin)   Blood thinners, like clopidogrel (Plavix) or warfarin (Coumadin)   Iron supplements or vitamins containing iron. The night before, eat a light meal. Do not eat or drink anything after midnight. Wear comfortable clothing. If you have diabetes, ask your doctor if you need to adjust your insulin dose. Arrange for a ride home after the procedure. Anesthesia    You will receive a sedative. This will help you relax. You will be drowsy but awake. Description of the Procedure    You will be asked to lie on your side or on your back. A scope, a long flexible tube with a camera on the end, will be inserted through the anus. It will be slowly pushed through the rectum to the colon. The scope will also add air to open the colon. Using the scope, the doctor will locate the polyp. The polyp will be snipped off with a wire snare from the scope. In some cases, the polyp may be destroyed with an electric current. The electric current is also used to close the wound and stop bleeding. The polyps will then be removed for lab testing. When the doctor is finished, the scope will be slowly removed. For larger polyps, a laparoscopic surgical procedure may be needed. Special surgical tools will be inserted through small incisions in the abdomen. The tools will be used to locate and remove the polyp. How Long Will It Take? 30-60 minutes   Will It Hurt? The special cleaning solution, laxatives, and/or enemas often cause discomfort. During and following the procedure, there is little or no pain. You may feel pressure, bloating, and/or cramping because of the air passed into the colon. This discomfort will go away with the passing of gas. Your doctor may prescribe pain medicine. If not, you can take non-prescription pain relievers for discomfort. Post-procedure Care   At the [facility]    The polyps will be sent to a lab for testing. At Home    Expect a complete recovery within two weeks. To ensure a smooth recovery, be sure to follow your doctor's instructions , which may include:    The sedative will make you drowsy. Do not drive, operate machinery, or make important decisions the day of the procedure. Return to your normal diet the same or next day. Avoid tea, coffee, cola drinks, alcohol, and spicy foods for at least 2-3 days following surgery. These can irritate the digestive system. To speed healing, resume normal activities as soon as you feel able. Most people feel well enough by the next day. Ask your doctor when you can participate in any rigorous exercise. Ask your doctor about when it is safe to shower, bathe, or soak in water. You will be scheduled for a follow-up colonoscopy in the future. It will be important to check for recurrence of polyps. Your doctor will discuss the results with you either the day of surgery or the following day. Call Your Doctor   After arriving home, contact your doctor if any of the following occurs:   Signs of infection, including fever and chills   Redness, swelling, increasing pain, excessive bleeding, or discharge from the rectum (Up to cup of blood per day can be expected for up to 3-4 days following your polypectomy.)   Black, tarry stools   Severe abdominal pain   Hard, swollen abdomen   Inability to pass gas or stool   Cough , shortness of breath, chest pain, or severe nausea or vomiting   New, unexplained symptoms   In case of emergency,  CALL 911  . Last Reviewed: December 2010 Benson Sandhoff, MD   Updated: 4/7/2011          PATIENT INSTRUCTIONS  DIVERTICULOSIS    FOLLOW-UP:  Please make an appointment with your physician as directed. Call your physician immediately if you have any fevers greater than 102.5,  increasing abdominal pain, GI bleeding (from the colon or rectum),or nausea/vomiting. CAUSE:  Some people may have congenital diverticulosis, but most people develop diverticulosis around or after age 36 due to a low-fiber, high-fat diet, along with inadequate fluid intake.  This is very prevalent in the industrialized world where we eat a lot of processed, low fiber foods. Diverticuli develop due to firm stool and high pressures in the colon, along with secondary spasm, which causes outpouchings to occus where the small arteries penetrate the wall of the colon to feed the internal lining. These occur most commonly on the left side and lower portions of the colon. Diverticuli can then cause bleeding from the arteries at these sites of weakness when they rupture or the diveritculi can get blocked with stool and debris and become obstructed, causing diverticulosis, which is due to an infection. When these are infected, diverticulitis, it is often treated with antibiotics and bowel rest, but when severe, recurrent, or if rupture of the colon occurs, it may require surgery. Following appropriate dietary changes and taking the proper precautions is therefore very important. DIET:  You should increase your dietary fiber intake and take a fiber supplement twice a day. Make sure that you are taking a supplement that is just fiber and is not a laxative, which should be noted on the package. Starting a fiber supplement may cause increased gas, more frequent bowel movements, and distension at first but this should improve after a couple of weeks. Try to eat whole wheat breads and pasta, more fruits and vegetables, along with brown rice and plenty of fluids. Avoid small undigestible food items that could get stuck in these outpouchings, such as unpopped popcorn kernals, whole corn, small undigestible seeds, etc..    ACTIVITY:  Exercise is also a great way to prevent constipation and is encouraged. It may also help prevent progression of your diverticulosis. Always make sure you take in plenty of fluids when exercising. MEDICATIONS:  Take an over-the-counter fiber supplement as noted above twice daily. If your symptoms don't improve with fiber and dietary changes alone your physician may also recommend psyllium or methylcellulose as well. If your physician has placed you on an antibiotic it is critical that you take the full course of these, even if your symptoms have improved, and that you not miss any doses. QUESTIONS:  Please feel free to call your physician or the hospital  if you have any questions, and they will be glad to assist you. If you have further questions it may also be helpful to meet with a dietitician. High-Fiber Diet     What Is Fiber? Dietary fiber is a form of carbohydrate found in plants that cannot be digested by humans. All plants contain fiber, including fruits, vegetables, grains, and legumes. Fiber is often classified into two categories: soluble and insoluble. Soluble fiber draws water into the bowel and can help slow digestion. Examples of foods that are high in soluble fiber include oatmeal, oat bran, barley, legumes (eg, beans and peas), apples, and strawberries. Insoluble fiber speeds digestion and can add bulk to the stool. Examples of foods that are high in insoluble fiber include whole-wheat products, wheat bran, cauliflower, green beans, and potatoes. Why Follow a High-Fiber Diet? A high-fiber diet is often recommended to prevent and treat constipation , hemorrhoids , diverticulitis , and irritable bowel syndrome . Eating a high-fiber diet can also help improve your cholesterol levels, lower your risk of coronary heart disease , reduce your risk of type 2 diabetes , and lower your weight. For people with type 1 or 2 diabetes, a high-fiber diet can also help stabilize blood sugar levels. How Much Fiber Should I Eat? A high-fiber diet should contain  20-35 grams  of fiber a day. This is actually the amount recommended for the general adult population; however, most Americans eat only 15 grams of fiber per day. Digestion of Fiber   Eating a higher fiber diet than usual can take some getting used to by your body's digestive system.  To avoid the side effects of sudden increases in dietary fiber (eg, gas, cramping, bloating, and diarrhea), increase fiber gradually and be sure to drink plenty of fluids every day. Tips for Increasing Fiber Intake   Whenever possible, choose whole grains over refined grains (eg, brown rice instead of white rice, whole-wheat bread instead of white bread). Include a variety of grains in your diet, such as wheat, rye, barley, oats, quinoa, and bulgur. Eat more vegetarian-based meals. Here are some ideas: black bean burgers, eggplant lasagna, and veggie tofu stir-granados. Choose high-fiber snacks, such as fruits, popcorn, whole-grain crackers, and nuts. Make whole-grain cereal or whole-grain toast part of your daily breakfast regime. When eating out, whether ordering a sandwich or dinner, ask for extra vegetables. When baking, replace part of the white flour with whole-wheat flour. Whole-wheat flour is particularly easy to incorporate into a recipe. High-Fiber Diet Eating Guide   Food Category   Foods Recommended   Notes   Grains   Whole-grain breads, muffins, bagels, or rajwinder bread Rye bread Whole-wheat crackers or crisp breads Whole-grain or bran cereals Oatmeal, oat bran, or grits Wheat germ Whole-wheat pasta and brown rice   Read the ingredients list on food labels. Look for products that list \"whole\" as the first ingredient (eg, whole-wheat, whole oats). Choose cereals with at least 2 grams of fiber per serving.    Vegetables   All vegetables, especially asparagus, bean sprouts, broccoli, [place] sprouts, cabbage, carrots, cauliflower, celery, corn, greens, green beans, green pepper, onions, peas, potatoes (with skin), snow peas, spinach, squash, sweet potatoes, tomatoes, zucchini   For maximum fiber intake, eat the peels of fruits and vegetablesjust be sure to wash them well first.   Fruits   All fruits, especially apples, berries, grapefruits, mangoes, nectarines, oranges, peaches, pears, dried fruits (figs, dates, prunes, raisins)   Choose raw fruits and vegetables over juice, cooked, or cannedraw fruit has more fiber. Dried fruit is also a good source of fiber. Milk   With the exception of yogurt containing inulin (a type of fiber), dairy foods provide little fiber. Add more fiber by topping your yogurt or cottage cheese with fresh fruit, whole grain or bran cereals, nuts, or seeds. Meats and Beans   All beans and peas, especially Garbanzo beans, kidney beans, lentils, lima beans, split peas, and mcgill beans All nuts and seeds, especially almonds, peanuts, [place] nuts, cashews, peanut butter, walnuts, sesame and sunflower seeds All meat, poultry, fish, and eggs   Increase fiber in meat dishes by adding mcgill beans, kidney beans, black-eyed peas, bran, or oatmeal. If you are following a low-fat diet, use nuts and seeds only in moderation. Fats and Oils   All in moderation   Fats and oils do not provide fiber   Snacks, Sweets, and Condiments   Fruit Nuts Popcorn, whole-wheat pretzels, or trail mix made with dried fruits, nuts, and seeds Cakes, breads, and cookies made with oatmeal or whole-wheat flour   Most snack foods do not provide much fiber. Choose snacks with at least 2 grams of fiber per serving.      Last Reviewed: March 2011 Marleny Dueñas MS, MPH, RD   Updated: 3/29/2011

## 2022-08-11 NOTE — ANESTHESIA POSTPROCEDURE EVALUATION
POST- ANESTHESIA EVALUATION       Pt Name: Naheed Balbuena  MRN: [id number]  YOB: 1965  Date of evaluation: 8/11/2022  Time:  9:36 AM      BP (!) 142/91   Pulse 60   Temp 97.7 °F (36.5 °C) (Infrared)   Resp 14   Ht 5' 8\" (1.727 m)   Wt 165 lb (74.8 kg)   LMP 02/28/2013   SpO2 100%   BMI 25.09 kg/m²      Consciousness Level  Awake  Cardiopulmonary Status  Stable  Pain Adequately Treated YES  Nausea / Vomiting  NO  Adequate Hydration  YES  Anesthesia Related Complications NONE      Electronically signed by Lord Yesy MD on 8/11/2022 at 9:36 AM       Department of Anesthesiology  Postprocedure Note    Patient: Waldron Stagers Minor  MRN: [id number]  YOB: 1965  Date of evaluation: 8/11/2022      Procedure Summary     Date: 08/11/22 Room / Location: [place] [id number] / [facility]    Anesthesia Start: 6120 Anesthesia Stop: [place]    Procedure: COLONOSCOPY POLYPECTOMY SNARE/COLD AND HOT BIOPSY Diagnosis:       History of colon polyps      (HISTORY OF COLON POLYPS)    Surgeons: Idania Savage MD Responsible Provider: Lord Yesy MD    Anesthesia Type: General ASA Status: 2          Anesthesia Type: General    Christiana Phase I:      Christiana Phase II: Christiana Score: 9      Anesthesia Post Evaluation

## 2022-08-11 NOTE — H&P
HISTORY and [address]       NAME:  Paul Metzger  MRN: [id number]   YOB: 1965   Date: 8/11/2022   Age: 65 y.o. Gender: female       COMPLAINT AND PRESENT HISTORY:   Mike Mcmillan Minor  is a 65 y.o. female presenting today for COLONOSCOPY DIAGNOSTIC as r/t HISTORY OF COLON POLYPS. Pt denies any gi symptoms including n/v/d/c, no abdominal pain, no bloody or tarry stools. Pt does have hx of colon polyps. She denies any family hx of colon cancer. Pt reports completing bowel prep without complications, last BM reported this morning. She states last BM was clear with no stool particles. Pt has a PMHX significant for HTN        Patient states they have been NPO since before midnight. Pt does not wear dentures. Pt denies any hx of MRSA infection  Pt not currently taking any blood thinners or anticoagulants  Pt denies any personal or FHx of complications with anesthesia. Pt denies any acute symptoms of illness at this time including no SOB, CP, fever, URI or UTI symptoms. RECENT IMAGING    No results found.      PAST MEDICAL HISTORY     Past Medical History:   Diagnosis Date    Chronic back pain     Essential hypertension 2/5/2020    Fibroid uterus     Post-menopausal bleeding     Sebaceous cyst     Under care of team 05/20/2022    PCP: Cheyanne STOLL, [facility], last visit 4/2022    UTI (urinary tract infection)        SURGICAL HISTORY       Past Surgical History:   Procedure Laterality Date    COLONOSCOPY N/A 03/27/2019    COLONOSCOPY WITH BIOPSY performed by Leslie Barroso MD at [facility]    EXCISION/BIOPSY N/A 05/24/2022    EXCISION OF BACK  SEBACEOUS CYST (N/A )    HYSTEROSCOPY  06/04/2015    and D&C    OTHER SURGICAL HISTORY  1984    vaginal warts removed    SKIN BIOPSY N/A 5/24/2022    EXCISION OF BACK  SEBACEOUS CYST performed by Anam Tanner IV, DO at [facility]      at age 12    [address] History   Problem Relation Age of Onset    High Blood Pressure Mother     Heart Disease Mother     High Blood Pressure Father     Heart Disease Father        SOCIAL HISTORY       Social History     Socioeconomic History    Marital status: Single   Tobacco Use    Smoking status: Every Day     Packs/day: 0.50     Years: 0.00     Pack years: 0.00     Types: Cigarettes    Smokeless tobacco: Never   Vaping Use    Vaping Use: Never used   Substance and Sexual Activity    Alcohol use: Yes     Comment: social    Drug use: No    Sexual activity: Yes     Social Determinants of Health     Financial Resource Strain: High Risk    Difficulty of Paying Living Expenses: Very hard   Food Insecurity: No Food Insecurity    Worried About Running Out of Food in the Last Year: Never true    Ran Out of Food in the Last Year: Never true           REVIEW OF SYSTEMS      Allergies   Allergen Reactions    Codeine Nausea And Vomiting       No current facility-administered medications on file prior to encounter. Current Outpatient Medications on File Prior to Encounter   Medication Sig Dispense Refill    zoster recombinant adjuvanted vaccine [facility]) 50 MCG/0.5ML SUSR injection Inject 0.5 mLs into the muscle See Admin Instructions 1 dose now and repeat in 2-6 months 0.5 mL 0    amLODIPine (NORVASC) 5 MG tablet Take 1 tablet by mouth daily 90 tablet 1    docusate sodium (COLACE) 100 MG capsule Take 1 capsule by mouth daily 30 capsule 0    naproxen (NAPROSYN) 500 MG tablet Take 1 tablet by mouth 2 times daily (with meals) 60 tablet 0    enalapril (VASOTEC) 20 MG tablet TAKE 1 TABLET BY MOUTH DAILY 30 tablet 3        Review of Systems   Constitutional:  Negative for chills, diaphoresis, fatigue and fever. HENT:  Negative for congestion, dental problem, ear pain, postnasal drip, rhinorrhea, sinus pressure, sinus pain, sore throat and trouble swallowing. Eyes:  Positive for visual disturbance.    Respiratory:  Negative for apnea, cough, chest tightness, shortness of breath and wheezing. Cardiovascular:  Negative for chest pain, palpitations and leg swelling. Gastrointestinal:         See hpi    Genitourinary:  Negative for dysuria, flank pain, frequency and hematuria. Musculoskeletal:  Negative for back pain, joint swelling and myalgias. Skin:  Negative for rash and wound. Neurological:  Negative for dizziness, weakness, numbness and headaches. Hematological:  Does not bruise/bleed easily. Psychiatric/Behavioral:  Negative for agitation and confusion. The patient is not nervous/anxious. See HPI    GENERAL PHYSICAL EXAM:     Vitals: See nurse flow sheet     Physical Exam  Constitutional:       General: She is not in acute distress. Appearance: Normal appearance. She is well-developed and normal weight. She is not ill-appearing or toxic-appearing. HENT:      Head: Normocephalic and atraumatic. Mouth/Throat:      Mouth: Mucous membranes are dry. Pharynx: Oropharynx is clear. No oropharyngeal exudate or posterior oropharyngeal erythema. Eyes:      Extraocular Movements: Extraocular movements intact. Conjunctiva/sclera: Conjunctivae normal.      Pupils: Pupils are equal, round, and reactive to light. Cardiovascular:      Rate and Rhythm: Normal rate and regular rhythm. Pulses: Normal pulses. Heart sounds: Normal heart sounds. No murmur heard. No friction rub. No gallop. Pulmonary:      Effort: Pulmonary effort is normal.      Breath sounds: Normal breath sounds. No wheezing. Abdominal:      General: Bowel sounds are normal. There is no distension. Palpations: Abdomen is soft. Tenderness: There is no abdominal tenderness. There is no guarding or rebound. Comments: Hyperactive bs. Musculoskeletal:         General: No swelling. Normal range of motion. Cervical back: Normal range of motion and neck supple. No rigidity or tenderness. Right lower leg: No edema. Left lower leg: No edema. Skin:     General: Skin is warm and dry. Findings: No erythema. Neurological:      General: No focal deficit present. Mental Status: She is alert and oriented to person, place, and time. Mental status is at baseline. Sensory: No sensory deficit. Psychiatric:         Mood and Affect: Mood normal.         Behavior: Behavior normal.         Thought Content:  Thought content normal.         Judgment: Judgment normal.                                                                                       PROVISIONAL DIAGNOSES / SURGERY:      COLONOSCOPY DIAGNOSTIC     HISTORY OF COLON POLYPS    Patient Active Problem List    Diagnosis Date Noted    Essential hypertension 02/05/2020    Serrated adenoma of colon 09/25/2019    Chronic pain of both knees 02/18/2019    Chronic midline low back pain without sciatica 02/18/2019               CHARLINE Jefferson - CNP on 8/11/2022 at 5:46 AM

## 2022-08-11 NOTE — OP NOTE
PROCEDURE NOTE    DATE OF PROCEDURE: 8/11/2022    SURGEON: Farida Bradford MD  Facility : [facility]  ASSISTANT: None  Anesthesia: MAC  PREOPERATIVE DIAGNOSIS:   History of polyps    POSTOPERATIVE DIAGNOSIS: as described below    OPERATION: Total colonoscopy     ANESTHESIA: Moderate Sedation    ESTIMATED BLOOD LOSS: less than 50     COMPLICATIONS: None. SPECIMENS:  Was Obtained:     Multiple polyps from the sigmoid colon the larger one was 12 mm snared with hot snare but in 1 jar      Multiple polyps from the transverse colon, the larger was 8 mm removed with cold snare        HISTORY: The patient is a 65y.o. year old female with history of above preop diagnosis. I recommended colonoscopy with possible biopsy or polypectomy and I explained the risk, benefits, expected outcome, and alternatives to the procedure. Risks included but are not limited to bleeding, infection, respiratory distress, hypotension, and perforation of the colon and possibility of missing a lesion. The patient understands and is in agreement. The patient was counseled at length about the risks of dariusz Covid-19 during their perioperative period and any recovery window from their procedure. The patient was made aware that dariusz Covid-19  may worsen their prognosis for recovering from their procedure  and lend to a higher morbidity and/or mortality risk. All material risks, benefits, and reasonable alternatives including postponing the procedure were discussed. The patient does wish to proceed with the procedure at this time. PROCEDURE: The patient was given IV conscious sedation. The patient's SPO2 remained above 90% throughout the procedure. The colonoscope was inserted per rectum and advanced under direct vision to the cecum without difficulty. Post sedation note : The patient's SPO2 remained above 90% throughout the procedure. the vital signs remained stable , and no immediate complication form the procedure noted, patient will be ready for d/c when criteria is met . The prep was good. Findings:  Terminal ileum: normal    Cecum/Ascending colon: normal    Transverse colon: abnormal:   Multiple polyps from the sigmoid colon the larger one was 12 mm snared with hot snare but in 1 jar          Descending/Sigmoid colon: abnormal:   Multiple polyps from the transverse colon, the larger was 8 mm removed with cold snare      Diverticulosis    Rectum/Anus: examined in normal and retroflexed positions and was abnormal: Minor hemorrhoids    Withdrawal Time was (minutes): 10    The colon was decompressed and the scope was removed. The patient tolerated the procedure well.      Recommendations/Plan:   Lifestyle and dietary modifications as discussed  F/U Biopsies  F/U In Office no  Discussed with the family  Repeat colonoscopy [id number]    Electronically signed by John Massey MD  on 8/11/2022 at 8:49 AM

## 2022-08-11 NOTE — ANESTHESIA PRE PROCEDURE
Department of Anesthesiology  Preprocedure Note       Name:  Kvng Goncalves   Age:  65 y.o.  :  1965                                          MRN:  [id number]         Date:  2022      Surgeon: Mona Hutchins):  Ira Alfonso MD    Procedure: Procedure(s):  COLONOSCOPY DIAGNOSTIC    Medications prior to admission:   Prior to Admission medications    Medication Sig Start Date End Date Taking? Authorizing Provider   magnesium citrate solution Take 296 mLs by mouth once for 1 dose 22  Kevin Borrego MD   zoster recombinant adjuvanted vaccine [facility]) 50 MCG/0.5ML SUSR injection Inject 0.5 mLs into the muscle See Admin Instructions 1 dose now and repeat in 2-6 months 7/7/22 1/3/23  CHARLINE Sheppard CNP   amLODIPine (NORVASC) 5 MG tablet Take 1 tablet by mouth daily 22   CHARLINE Sheppard CNP   docusate sodium (COLACE) 100 MG capsule Take 1 capsule by mouth daily 22   Landon Schneider MD   naproxen (NAPROSYN) 500 MG tablet Take 1 tablet by mouth 2 times daily (with meals) 22   Andriy Sánchez MD   enalapril (VASOTEC) 20 MG tablet TAKE 1 TABLET BY MOUTH DAILY 22   CHARLINE Sheppard CNP       Current medications:    Current Facility-Administered Medications   Medication Dose Route Frequency Provider Last Rate Last Admin    lidocaine PF 1 % injection 1 mL  1 mL IntraDERmal Once PRN Jeffry Collier MD        lactated ringers infusion   IntraVENous Continuous Jeffry Collier  mL/hr at 22 0630 New Bag at 22 0630    sodium chloride flush 0.9 % injection 5-40 mL  5-40 mL IntraVENous 2 times per day eJffry Collier MD        sodium chloride flush 0.9 % injection 5-40 mL  5-40 mL IntraVENous PRN Jeffry Collier MD        0.9 % sodium chloride infusion   IntraVENous PRN Jeffry Collier MD           Allergies:     Allergies   Allergen Reactions    Codeine Nausea And Vomiting       Problem List:    Patient Active Problem List   Diagnosis Code    Chronic pain of both knees M25.561, M25.562, G89.29    Chronic midline low back pain without sciatica M54.50, G89.29    Serrated adenoma of colon D12.6    Essential hypertension I10       Past Medical History:        Diagnosis Date    Chronic back pain     Essential hypertension 2/5/2020    Fibroid uterus     Post-menopausal bleeding     Sebaceous cyst     Under care of team 05/20/2022    [facility]    UTI (urinary tract infection)        Past Surgical History:        Procedure Laterality Date    COLONOSCOPY N/A 03/27/2019    COLONOSCOPY WITH BIOPSY performed by John Massey MD at [facility]    EXCISION/BIOPSY N/A 05/24/2022    EXCISION OF BACK  SEBACEOUS CYST (N/A )    HYSTEROSCOPY  06/04/2015    and D&C    OTHER SURGICAL HISTORY  1984    vaginal warts removed    SKIN BIOPSY N/A 5/24/2022    EXCISION OF BACK  SEBACEOUS CYST performed by Leonid Tanner IV, DO at [place] [address]      at age 12   [address]       Social History:    Social History     Tobacco Use    Smoking status: Every Day     Packs/day: 0.50     Years: 0.00     Pack years: 0.00     Types: Cigarettes    Smokeless tobacco: Never   Substance Use Topics    Alcohol use: Yes     Comment: social                                Ready to quit: Not Answered  Counseling given: Not Answered      Vital Signs (Current):   Vitals:    08/11/22 0615   BP: (!) 140/70   Pulse: 50   Resp: 16   Temp: (!) 96.6 °F (35.9 °C)   TempSrc: Infrared   SpO2: 97%   Weight: 165 lb (74.8 kg)   Height: 5' 8\" (1.727 m)                                              BP Readings from Last 3 Encounters:   08/11/22 (!) 140/70   07/07/22 (!) 159/90   06/22/22 138/85       NPO Status: Time of last liquid consumption: 2230                        Time of last solid consumption: 2359                        Date of last liquid consumption: 08/10/22                        Date of last solid food consumption: 08/09/22    BMI:   Wt Readings from Last 3 Encounters:   08/11/22 165 lb (74.8 kg)   07/29/22 165 lb (74.8 kg)   07/07/22 168 lb (76.2 kg)     Body mass index is 25.09 kg/m². CBC:   Lab Results   Component Value Date/Time    WBC 7.3 07/10/2016 01:52 PM    RBC 5.33 07/10/2016 01:52 PM    RBC 4.68 04/28/2012 02:47 PM    HGB 14.1 07/10/2016 01:52 PM    HCT 40.8 07/10/2016 01:52 PM    MCV 76.6 07/10/2016 01:52 PM    RDW 14.9 07/10/2016 01:52 PM     07/10/2016 01:52 PM     04/28/2012 02:47 PM       CMP:   Lab Results   Component Value Date/Time     02/18/2019 10:34 AM    K 3.8 02/18/2019 10:34 AM     02/18/2019 10:34 AM    CO2 28 02/18/2019 10:34 AM    BUN 17 02/18/2019 10:34 AM    CREATININE 0.89 05/24/2022 12:45 PM    CREATININE 0.69 02/18/2019 10:34 AM    GFRAA >60 02/18/2019 10:34 AM    LABGLOM >60 05/24/2022 12:45 PM    GLUCOSE 88 02/18/2019 10:34 AM    GLUCOSE 87 04/28/2012 02:47 PM    PROT 6.9 02/18/2019 10:34 AM    CALCIUM 8.9 02/18/2019 10:34 AM    BILITOT 0.47 02/18/2019 10:34 AM    ALKPHOS 47 02/18/2019 10:34 AM    AST 19 02/18/2019 10:34 AM    ALT 10 02/18/2019 10:34 AM       POC Tests: No results for input(s): POCGLU, POCNA, POCK, POCCL, POCBUN, POCHEMO, POCHCT in the last 72 hours.     Coags: No results found for: PROTIME, INR, APTT    HCG (If Applicable):   Lab Results   Component Value Date    PREGTESTUR negative 08/18/2021        ABGs: No results found for: PHART, PO2ART, KGL4TLD, NXQ1GRF, BEART, V7FBGBUA     Type & Screen (If Applicable):  No results found for: LABABO, LABRH    Drug/Infectious Status (If Applicable):  No results found for: HIV, HEPCAB    COVID-19 Screening (If Applicable):   Lab Results   Component Value Date/Time    COVID19 Not Detected 07/08/2020 11:05 PM           Anesthesia Evaluation  Patient summary reviewed and Nursing notes reviewed no history of anesthetic complications:   Airway: Mallampati: II  TM distance: >3 FB   Neck ROM: full  Mouth opening: > = 3 FB   Dental:          Pulmonary:Negative Pulmonary ROS breath sounds clear to auscultation                             Cardiovascular:    (+) hypertension:,         Rhythm: regular  Rate: normal                    Neuro/Psych:   Negative Neuro/Psych ROS              GI/Hepatic/Renal:   (+) bowel prep,           Endo/Other: Negative Endo/Other ROS                    Abdominal:             Vascular: negative vascular ROS. Other Findings:           Anesthesia Plan      general     ASA 2       Induction: intravenous. Anesthetic plan and risks discussed with patient. Plan discussed with CRNA.                     Tereso Patel MD   8/11/2022

## 2022-08-12 LAB — SURGICAL PATHOLOGY REPORT: NORMAL

## 2022-08-24 ENCOUNTER — TELEPHONE (OUTPATIENT)
Dept: OBGYN CLINIC | Age: 57
End: 2022-08-24

## 2022-08-24 NOTE — TELEPHONE ENCOUNTER
Patient calling because she needs a refill on her Amoxicillian for her chronic infection. She said she was just given this 3 months ago for her uroplasma. Please advise.

## 2022-09-07 ENCOUNTER — OFFICE VISIT (OUTPATIENT)
Dept: OBGYN CLINIC | Age: 57
End: 2022-09-07
Payer: MEDICARE

## 2022-09-07 ENCOUNTER — HOSPITAL ENCOUNTER (OUTPATIENT)
Age: 57
Setting detail: SPECIMEN
Discharge: HOME | End: 2022-09-07

## 2022-09-07 VITALS
HEIGHT: 68 IN | WEIGHT: 160 LBS | SYSTOLIC BLOOD PRESSURE: 116 MMHG | HEART RATE: 55 BPM | DIASTOLIC BLOOD PRESSURE: 70 MMHG | BODY MASS INDEX: 24.25 KG/M2

## 2022-09-07 DIAGNOSIS — Z01.419 WELL WOMAN EXAM: Primary | ICD-10-CM

## 2022-09-07 DIAGNOSIS — N76.0 ACUTE VAGINITIS: ICD-10-CM

## 2022-09-07 DIAGNOSIS — Z12.31 SCREENING MAMMOGRAM FOR BREAST CANCER: ICD-10-CM

## 2022-09-07 DIAGNOSIS — Z11.51 ENCOUNTER FOR SCREENING FOR HUMAN PAPILLOMAVIRUS (HPV): ICD-10-CM

## 2022-09-07 PROCEDURE — 99396 PREV VISIT EST AGE 40-64: CPT | Performed by: CLINICAL NURSE SPECIALIST

## 2022-09-07 RX ORDER — METRONIDAZOLE 7.5 MG/G
GEL VAGINAL
Qty: 1 EACH | Refills: 5 | Status: SHIPPED | OUTPATIENT
Start: 2022-09-07

## 2022-09-07 NOTE — PROGRESS NOTES
[address] [facility] GYN  [address] [facility] [address]  Dept: [phone]        DATE OF VISIT:  22        History and Physical    Nicy T Minor    :  1965  CHIEF COMPLAINT:    Chief Complaint   Patient presents with    Gynecologic Exam                    Sherif Seen is a 65 y.o. female who presents for annual well woman exam. Patient has history of abnormal pap and colpo in . The patient was seen and examined. Per the patient bowels areregular. She has no voiding complaints. She denies any bloating as well as vaginal discharge. Chaperone for Intimate Exam  Chaperone was offered as part of the rooming process. Patient declined and agrees to continue with exam without a chaperone.   Chaperone: none     _____________________________________________________________________  Past Medical History:   Diagnosis Date    Chronic back pain     Essential hypertension 2020    Fibroid uterus     Post-menopausal bleeding     Sebaceous cyst     Under care of team 2022    [facility]    UTI (urinary tract infection)                                                                    Past Surgical History:   Procedure Laterality Date    COLONOSCOPY N/A 2019    COLONOSCOPY WITH BIOPSY performed by Ashley Mello MD at [facility]    COLONOSCOPY N/A 2022    COLONOSCOPY POLYPECTOMY SNARE/COLD AND HOT BIOPSY performed by Ashley Mello MD at [facility] N/A 2022    EXCISION OF BACK  SEBACEOUS CYST (N/A )    HYSTEROSCOPY  2015    and D&C    OTHER SURGICAL HISTORY      vaginal warts removed    SKIN BIOPSY N/A 2022    EXCISION OF BACK  SEBACEOUS CYST performed by Dennise Tanner IV, DO at [facility]      at age 12    [address] [facility]     Family History   Problem Relation Age of Onset    High Blood Pressure Mother     Heart Disease Mother     High Blood Pressure Father     Heart Disease Father      Social History     Tobacco Use   Smoking Status Every Day    Packs/day: 0.50    Years: 0.00    Pack years: 0.00    Types: Cigarettes   Smokeless Tobacco Never     Social History     Substance and Sexual Activity   Alcohol Use Yes    Comment: social     Current Outpatient Medications   Medication Sig Dispense Refill    metroNIDAZOLE (METROGEL VAGINAL) 0.75 % vaginal gel Insert into vagina at bedtime for 5 nights. Take 3 nights off then twice weekly for 6 months. 1 each 5    magnesium citrate solution Take 296 mLs by mouth once for 1 dose 296 mL 0    zoster recombinant adjuvanted vaccine (SHINGRIX) 50 MCG/0.5ML SUSR injection Inject 0.5 mLs into the muscle See Admin Instructions 1 dose now and repeat in 2-6 months 0.5 mL 0    amLODIPine (NORVASC) 5 MG tablet Take 1 tablet by mouth daily 90 tablet 1    docusate sodium (COLACE) 100 MG capsule Take 1 capsule by mouth daily 30 capsule 0    naproxen (NAPROSYN) 500 MG tablet Take 1 tablet by mouth 2 times daily (with meals) 60 tablet 0    enalapril (VASOTEC) 20 MG tablet TAKE 1 TABLET BY MOUTH DAILY 30 tablet 3     No current facility-administered medications for this visit. Allergies: Allergies   Allergen Reactions    Codeine Nausea And Vomiting       Gynecologic History:  Patient's last menstrual period was 2013.   Sexually Active: No  STD History:Yes gonorrhea, syphillis many yrs ago, genital warts  Birth Control: No    OB History    Para Term  AB Living   6 2 1 0 0 1   SAB IAB Ectopic Molar Multiple Live Births   0 0 0 0 0 0     ______________________________________________________________________    Review of Systems    REVIEW OFSYSTEMS:        Constitutional:  Unexpected weight change, extreme fatigue, night sweats              no  Skin:                           Rashes, moles   no  Neurological:  Frequentheadaches, seizures         no  Ophthalmic:  Recent visual changes no  ENT:   Difficulty swallowing  no  Breast:              Masses, pain, nipple discharge                            no     Respiratory:  Shortness of breath, coughing           no    Cardiovascular: Chest pain   no     Gastrointestinal: Chronic diarrhea/constipation, nausea/vomiting           no   Urogenital:  Urinary incontinence, frequency, urgency          no                                         Heavy/irregular periods           no                                      Vaginal discharge  white with odor which has been ongoing for the past 2 wks                  yes  Hematological: Bruises easy Denies clotting disorder  yes     Endocrine:  Hot flashes rarely  yes     Hot/Cold Intolerance  no    Psychological:            Mood and affect were within normal limits. no                 Physical Exam    Physical Exam:    Vitals:    09/07/22 0847   BP: 116/70   Pulse: 55   Weight: 160 lb (72.6 kg)   Height: 5' 8\" (1.727 m)       General Appearance: This  is a well developed, well nourished, well groomed female. Her BMI was reviewed. Nutritional decision making andexercise were discussed. Neurological:  The patient is alert and oriented to time,place, person, and situation    Skin:  A brief inspection of the skin revealed no rashes or lesions. Neck:  The neck was supple. Respiratory: There was unlabored respiratory effort. Lungs clear to ascultation. Cardiovascular: The patients extremities were without calf tenderness or edema. Heart with a regular rate and rhythm. Abdomen: The abdomen was soft and non-tender with no guarding, rebound or rigidity. No hernias were appreciated. Breast:   The patients breasts were symmetrical.  There were no masses, discharge or retractions noted. Self breast exams were reviewed. Pelvic Exam:  The external genitalia was with a normal appearance.            The vaginal vault was normal. There were no cystocele, rectocele, or enterocele appreciated. There was no vaginal discharge. The cervix was without lesions. There was no cervical motion tenderness. The uterus was mobile, midline and regular. The adnexa no fullness, tenderness or masses appreciated. ASSESSMENT: Normal annual well woman exam    65 y.o. Female; Annual   Diagnosis Orders   1. Well woman exam  PAP SMEAR      2. Screening mammogram for breast cancer  STEPHANIE DIGITAL SCREEN W OR WO CAD BILATERAL      3. Encounter for screening for human papillomavirus (HPV)  PAP SMEAR      4. Acute vaginitis  C.trachomatis N.gonorrhoeae DNA    Vaginitis DNA Probe    metroNIDAZOLE (METROGEL VAGINAL) 0.75 % vaginal gel                      PLAN:  - Discussed new papsmear guidelines. - Patient is current on colonoscopy  - Smoking risk factors Discussed  - Diet and exercise reviewed. - Routine healthmaintenance per patients PCP.  - Return to clinic in 1 year or earlier with questions, problems, concerns. Return for 1 year for Annual and as needed.         Electronically signed by CHARLINE Pacheco CNP on 9/7/2022 at 9:16 AM

## 2022-09-08 ENCOUNTER — TELEPHONE (OUTPATIENT)
Dept: OBGYN CLINIC | Age: 57
End: 2022-09-08

## 2022-09-08 LAB
C TRACH DNA GENITAL QL NAA+PROBE: NEGATIVE
CANDIDA SPECIES, DNA PROBE: NEGATIVE
GARDNERELLA VAGINALIS, DNA PROBE: POSITIVE
HPV SAMPLE: ABNORMAL
HPV, GENOTYPE 16: NOT DETECTED
HPV, GENOTYPE 18: DETECTED
HPV, HIGH RISK OTHER: NOT DETECTED
HPV, INTERPRETATION: ABNORMAL
N. GONORRHOEAE DNA: NEGATIVE
SOURCE: ABNORMAL
SPECIMEN DESCRIPTION: ABNORMAL
SPECIMEN DESCRIPTION: NORMAL
TRICHOMONAS VAGINALIS DNA: NEGATIVE

## 2022-09-08 NOTE — TELEPHONE ENCOUNTER
----- Message from Jori Rinne, APRN - CNP sent at 9/8/2022  8:13 AM EDT -----  Patient was tx at visit with metrogel and is supposed to do long term tx

## 2022-09-13 DIAGNOSIS — Z22.39 CARRIER OF UREAPLASMA UREALYTICUM: Primary | ICD-10-CM

## 2022-09-13 LAB — UREAP-MYCOPLASMA CUL: NORMAL

## 2022-09-13 RX ORDER — AZITHROMYCIN 500 MG/1
TABLET, FILM COATED ORAL
Qty: 5 TABLET | Refills: 0 | Status: SHIPPED | OUTPATIENT
Start: 2022-09-13

## 2022-09-13 RX ORDER — DOXYCYCLINE HYCLATE 100 MG
100 TABLET ORAL 2 TIMES DAILY
Qty: 14 TABLET | Refills: 0 | Status: SHIPPED | OUTPATIENT
Start: 2022-09-13 | End: 2022-09-20

## 2022-09-22 LAB — CYTOLOGY REPORT: NORMAL

## 2022-09-29 ENCOUNTER — TELEPHONE (OUTPATIENT)
Dept: OBGYN CLINIC | Age: 57
End: 2022-09-29

## 2022-09-29 NOTE — TELEPHONE ENCOUNTER
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

## 2022-10-26 RX ORDER — NAPROXEN 500 MG/1
500 TABLET ORAL 2 TIMES DAILY WITH MEALS
Qty: 60 TABLET | Refills: 0 | Status: SHIPPED | OUTPATIENT
Start: 2022-10-26

## 2022-11-03 RX ORDER — ENALAPRIL MALEATE 20 MG/1
TABLET ORAL
Qty: 30 TABLET | Refills: 3 | Status: SHIPPED | OUTPATIENT
Start: 2022-11-03 | End: 2022-11-07

## 2022-11-03 NOTE — TELEPHONE ENCOUNTER
Health Maintenance   Topic Date Due    Hepatitis C screen  Never done    DTaP/Tdap/Td vaccine (1 - Tdap) Never done    Shingles vaccine (1 of 2) Never done    COVID-19 Vaccine (4 - Booster for Pfizer series) 04/06/2022    Flu vaccine (1) Never done    Depression Screen  07/07/2023    Breast cancer screen  09/09/2023    Lipids  02/18/2024    Colorectal Cancer Screen  08/11/2025    Cervical cancer screen  09/07/2027    Pneumococcal 0-64 years Vaccine  Completed    HIV screen  Completed    Hepatitis A vaccine  Aged Out    Hib vaccine  Aged Out    Meningococcal (ACWY) vaccine  Aged Out             (applicable per patient's age: Cancer Screenings, Depression Screening, Fall Risk Screening, Immunizations)    LDL Cholesterol (mg/dL)   Date Value   02/18/2019 95     AST (U/L)   Date Value   02/18/2019 19     ALT (U/L)   Date Value   02/18/2019 10     BUN (mg/dL)   Date Value   02/18/2019 17      (goal A1C is < 7)   (goal LDL is <100) need 30-50% reduction from baseline     BP Readings from Last 3 Encounters:   09/07/22 116/70   08/11/22 (!) 161/87   07/07/22 (!) 159/90    (goal /80)      All Future Testing planned in [facility]:  Lab Frequency Next Occurrence   STEPHANIE DIGITAL SCREEN W OR WO CAD BILATERAL Once 07/07/2022   PAP SMEAR Once 09/07/2022   STEPHANIE DIGITAL SCREEN W OR WO CAD BILATERAL Once 09/07/2022       Next Visit Date:  Future Appointments   Date Time Provider [place] Arechiga   1/9/2023  9:45 AM CHARLINE Guerra - CNP [facility]   3/6/2023  9:00 AM Jace Miller MD OB [address]            Patient Active Problem List:     Chronic pain of both knees     Chronic midline low back pain without sciatica     Serrated adenoma of colon     Essential hypertension

## 2022-11-07 ENCOUNTER — OFFICE VISIT (OUTPATIENT)
Dept: OBGYN CLINIC | Age: 57
End: 2022-11-07
Payer: MEDICARE

## 2022-11-07 ENCOUNTER — HOSPITAL ENCOUNTER (OUTPATIENT)
Age: 57
Setting detail: SPECIMEN
Discharge: HOME | End: 2022-11-07

## 2022-11-07 VITALS
HEART RATE: 84 BPM | WEIGHT: 167 LBS | SYSTOLIC BLOOD PRESSURE: 128 MMHG | DIASTOLIC BLOOD PRESSURE: 82 MMHG | HEIGHT: 68 IN | BODY MASS INDEX: 25.31 KG/M2

## 2022-11-07 DIAGNOSIS — N76.0 ACUTE VAGINITIS: Primary | ICD-10-CM

## 2022-11-07 DIAGNOSIS — N76.0 ACUTE VAGINITIS: ICD-10-CM

## 2022-11-07 PROCEDURE — G8419 CALC BMI OUT NRM PARAM NOF/U: HCPCS | Performed by: CLINICAL NURSE SPECIALIST

## 2022-11-07 PROCEDURE — 3078F DIAST BP <80 MM HG: CPT | Performed by: CLINICAL NURSE SPECIALIST

## 2022-11-07 PROCEDURE — 3017F COLORECTAL CA SCREEN DOC REV: CPT | Performed by: CLINICAL NURSE SPECIALIST

## 2022-11-07 PROCEDURE — 99213 OFFICE O/P EST LOW 20 MIN: CPT | Performed by: CLINICAL NURSE SPECIALIST

## 2022-11-07 PROCEDURE — 3074F SYST BP LT 130 MM HG: CPT | Performed by: CLINICAL NURSE SPECIALIST

## 2022-11-07 PROCEDURE — 4004F PT TOBACCO SCREEN RCVD TLK: CPT | Performed by: CLINICAL NURSE SPECIALIST

## 2022-11-07 PROCEDURE — G8484 FLU IMMUNIZE NO ADMIN: HCPCS | Performed by: CLINICAL NURSE SPECIALIST

## 2022-11-07 PROCEDURE — G8427 DOCREV CUR MEDS BY ELIG CLIN: HCPCS | Performed by: CLINICAL NURSE SPECIALIST

## 2022-11-07 RX ORDER — CLINDAMYCIN HYDROCHLORIDE 300 MG/1
300 CAPSULE ORAL 2 TIMES DAILY
Qty: 14 CAPSULE | Refills: 1 | Status: SHIPPED | OUTPATIENT
Start: 2022-11-07 | End: 2022-11-14

## 2022-11-07 NOTE — PROGRESS NOTES
Subjective:      Patient ID:  Lamont Addison is a 65 y.o. female who presents for   Chief Complaint   Patient presents with    Vaginitis       HPI    Patient is a 66 yo female who presents for vaginal odor and discharge. Patient was started on long term treatment of metrogel for chronic BV but the patient reports that she does not like it, it falls out in clumps and it is too messy and is not helping. She reports foul odor for a few days, irritation/itchiness and burning with urination for two weeks. She using otc cream from [employer] which is helping with the irritation and itchiness. She is sexually active currently. No abdominal pain, no cramping, no bleeding or spotting. No fevers. Review of Systems   Constitutional:  Negative for chills and fever. HENT: Negative. Eyes: Negative. Respiratory: Negative. Cardiovascular: Negative. Gastrointestinal: Negative. Endocrine: Negative. Genitourinary:  Positive for vaginal discharge (white discharge witih odor). Negative for dysuria and menstrual problem. Musculoskeletal: Negative. Skin: Negative. Allergic/Immunologic: Negative. Neurological: Negative. Hematological: Negative. Psychiatric/Behavioral: Negative. /82 (Site: Left Upper Arm, Position: Sitting, Cuff Size: Medium Adult)   Pulse 84   Ht 5' 8\" (1.727 m)   Wt 167 lb (75.8 kg)   LMP 02/28/2013   Breastfeeding No   BMI 25.39 kg/m²    Patient's last menstrual period was 02/28/2013.     Family History   Problem Relation Age of Onset    High Blood Pressure Mother     Heart Disease Mother     High Blood Pressure Father     Heart Disease Father       Past Medical History:   Diagnosis Date    Chronic back pain     Essential hypertension 2/5/2020    Fibroid uterus     Post-menopausal bleeding     Sebaceous cyst     Under care of team 05/20/2022    PCP: Vito Cooks APRN-CNP, [facility], last visit 4/2022    UTI (urinary tract infection)       Past Surgical History: Procedure Laterality Date    COLONOSCOPY N/A 03/27/2019    COLONOSCOPY WITH BIOPSY performed by Joan Rivera MD at [facility]    COLONOSCOPY N/A 8/11/2022    COLONOSCOPY POLYPECTOMY SNARE/COLD AND HOT BIOPSY performed by Joan Rivera MD at [address] N/A 05/24/2022    EXCISION OF BACK  SEBACEOUS CYST (N/A )    HYSTEROSCOPY  06/04/2015    and D&C    OTHER SURGICAL HISTORY  1984    vaginal warts removed    SKIN BIOPSY N/A 5/24/2022    EXCISION OF BACK  SEBACEOUS CYST performed by Marco Tanner IV DO at [facility]      at age 12    [address] [facility]      Social History     Socioeconomic History    Marital status: Single     Spouse name: None    Number of children: None    Years of education: None    Highest education level: None   Tobacco Use    Smoking status: Every Day     Packs/day: 0.50     Years: 0.00     Pack years: 0.00     Types: Cigarettes    Smokeless tobacco: Never   Vaping Use    Vaping Use: Never used   Substance and Sexual Activity    Alcohol use: Yes     Comment: social    Drug use: No    Sexual activity: Yes     Social Determinants of Health     Financial Resource Strain: High Risk    Difficulty of Paying Living Expenses: Very hard   Food Insecurity: No Food Insecurity    Worried About Running Out of Food in the Last Year: Never true    Ran Out of Food in the Last Year: Never true      Current Outpatient Medications   Medication Sig Dispense Refill    clindamycin (CLEOCIN) 300 MG capsule Take 1 capsule by mouth in the morning and 1 capsule in the evening. Do all this for 7 days. 14 capsule 1    naproxen (NAPROSYN) 500 MG tablet Take 1 tablet by mouth 2 times daily (with meals) 60 tablet 0    metroNIDAZOLE (METROGEL VAGINAL) 0.75 % vaginal gel Insert into vagina at bedtime for 5 nights. Take 3 nights off then twice weekly for 6 months.  1 each 5    amLODIPine (NORVASC) 5 MG tablet Take 1 tablet by mouth daily 90 tablet 1     No current facility-administered medications for this visit. Objective:   Physical Exam  Vitals reviewed. Constitutional:       Appearance: She is well-developed. HENT:      Head: Normocephalic and atraumatic. Cardiovascular:      Rate and Rhythm: Normal rate and regular rhythm. Pulmonary:      Effort: Pulmonary effort is normal.      Breath sounds: Normal breath sounds. Genitourinary:     Vagina: Vaginal discharge (thin yellow discharge) present. Musculoskeletal:         General: Normal range of motion. Skin:     General: Skin is warm and dry. Neurological:      Mental Status: She is oriented to person, place, and time. Psychiatric:         Behavior: Behavior normal.         Thought Content: Thought content normal.         Judgment: Judgment normal.       Assessment:      Diagnosis Orders   1. Acute vaginitis  Vaginitis DNA Probe    clindamycin (CLEOCIN) 300 MG capsule              Plan:      Return for as needed. Patient was seen with total face to face time of 20 minutes. More than 50% of this visit was on counseling andeducation regarding the problems listed below and her options. She was also counseled on her preventative health maintenance recommendations and follow-up.     Electronically signed by: Scarlett Johnson CNP

## 2022-11-08 LAB
CANDIDA SPECIES, DNA PROBE: NEGATIVE
GARDNERELLA VAGINALIS, DNA PROBE: POSITIVE
SOURCE: ABNORMAL
TRICHOMONAS VAGINALIS DNA: NEGATIVE

## 2023-01-09 ENCOUNTER — OFFICE VISIT (OUTPATIENT)
Dept: PRIMARY CARE CLINIC | Age: 58
End: 2023-01-09
Payer: MEDICARE

## 2023-01-09 VITALS
HEART RATE: 68 BPM | BODY MASS INDEX: 25.33 KG/M2 | DIASTOLIC BLOOD PRESSURE: 84 MMHG | SYSTOLIC BLOOD PRESSURE: 141 MMHG | OXYGEN SATURATION: 99 % | WEIGHT: 166.6 LBS

## 2023-01-09 DIAGNOSIS — Z23 NEED FOR VACCINATION: ICD-10-CM

## 2023-01-09 DIAGNOSIS — F17.200 CURRENT SMOKER ON SOME DAYS: ICD-10-CM

## 2023-01-09 DIAGNOSIS — I10 ESSENTIAL HYPERTENSION: Primary | ICD-10-CM

## 2023-01-09 PROCEDURE — G8419 CALC BMI OUT NRM PARAM NOF/U: HCPCS | Performed by: NURSE PRACTITIONER

## 2023-01-09 PROCEDURE — 3017F COLORECTAL CA SCREEN DOC REV: CPT | Performed by: NURSE PRACTITIONER

## 2023-01-09 PROCEDURE — 90715 TDAP VACCINE 7 YRS/> IM: CPT | Performed by: NURSE PRACTITIONER

## 2023-01-09 PROCEDURE — G8427 DOCREV CUR MEDS BY ELIG CLIN: HCPCS | Performed by: NURSE PRACTITIONER

## 2023-01-09 PROCEDURE — 3077F SYST BP >= 140 MM HG: CPT | Performed by: NURSE PRACTITIONER

## 2023-01-09 PROCEDURE — G8484 FLU IMMUNIZE NO ADMIN: HCPCS | Performed by: NURSE PRACTITIONER

## 2023-01-09 PROCEDURE — 4004F PT TOBACCO SCREEN RCVD TLK: CPT | Performed by: NURSE PRACTITIONER

## 2023-01-09 PROCEDURE — 99214 OFFICE O/P EST MOD 30 MIN: CPT | Performed by: NURSE PRACTITIONER

## 2023-01-09 PROCEDURE — 90471 IMMUNIZATION ADMIN: CPT | Performed by: NURSE PRACTITIONER

## 2023-01-09 PROCEDURE — 3079F DIAST BP 80-89 MM HG: CPT | Performed by: NURSE PRACTITIONER

## 2023-01-09 RX ORDER — ENALAPRIL MALEATE 20 MG/1
TABLET ORAL
Qty: 30 TABLET | Refills: 5 | Status: SHIPPED | OUTPATIENT
Start: 2023-01-09

## 2023-01-09 RX ORDER — CLINDAMYCIN HYDROCHLORIDE 300 MG/1
CAPSULE ORAL
COMMUNITY
Start: 2022-12-05

## 2023-01-09 RX ORDER — ENALAPRIL MALEATE 20 MG/1
TABLET ORAL
COMMUNITY
Start: 2022-12-28 | End: 2023-01-09 | Stop reason: SDUPTHER

## 2023-01-09 ASSESSMENT — PATIENT HEALTH QUESTIONNAIRE - PHQ9
SUM OF ALL RESPONSES TO PHQ QUESTIONS 1-9: 0
1. LITTLE INTEREST OR PLEASURE IN DOING THINGS: 0
SUM OF ALL RESPONSES TO PHQ QUESTIONS 1-9: 0
2. FEELING DOWN, DEPRESSED OR HOPELESS: 0
SUM OF ALL RESPONSES TO PHQ9 QUESTIONS 1 & 2: 0
SUM OF ALL RESPONSES TO PHQ QUESTIONS 1-9: 0
SUM OF ALL RESPONSES TO PHQ QUESTIONS 1-9: 0

## 2023-01-09 ASSESSMENT — ENCOUNTER SYMPTOMS
NAUSEA: 0
ABDOMINAL PAIN: 0
DIARRHEA: 0
CHOKING: 0
BLURRED VISION: 0
ORTHOPNEA: 0
TROUBLE SWALLOWING: 0
BACK PAIN: 0
ABDOMINAL DISTENTION: 0
BLOOD IN STOOL: 0
VOMITING: 0
SINUS PAIN: 0

## 2023-01-09 NOTE — PROGRESS NOTES
[facility]  [address] [address]  Dept: [phone]  Dept Fax: [phone]    Syeda Walsh (:  1965) is a 65 y.o. female,Established patient, here for evaluation of the following chief complaint(s):  Hypertension (6 month follow up)         ASSESSMENT/PLAN:  1. Essential hypertension  -     enalapril (VASOTEC) 20 MG tablet; TAKE 1 TABLET BY MOUTH DAILY, Disp-30 tablet, [id number]. Refill Due on 22. Queued by [employer] . Normal  -     Basic Metabolic Panel, Fasting; Future  2. Current smoker on some days  3. Need for vaccination  -     Tdap, BOOSTRIX, (age 8 yrs+), IM    Blood pressure recently at goal, mildly elevated today. Monitor current dosing and consider adjusting amlodipine if needed at follow-up. Encouraged current smoking cessation, patient previously was some day smoker but has not smoked in over 9 days. Heavily discussed sodium intake in regards to blood pressure control and generalized swelling. Encouraged routine movement for exercise, did discussed gentle stretching and back exercises as well today. Return in about 6 months (around 2023) for HTN. Subjective   SUBJECTIVE/OBJECTIVE:      Samm she has not smoked since New years, used to only smoke when drinking  Taking enalapril for blood pressure, prefers to take in the evening along with Norvasc        Hypertension  This is a chronic problem. The problem is unchanged. The problem is uncontrolled. Pertinent negatives include no anxiety, blurred vision, neck pain, orthopnea, palpitations or peripheral edema. Past treatments include ACE inhibitors. The current treatment provides mild improvement. Review of Systems   Constitutional:  Negative for appetite change and diaphoresis. HENT:  Negative for congestion, drooling, sinus pain and trouble swallowing. Eyes:  Negative for blurred vision.    Respiratory:  Negative for choking. Cardiovascular:  Negative for palpitations and orthopnea. Gastrointestinal:  Negative for abdominal distention, abdominal pain, blood in stool, diarrhea, nausea and vomiting. Genitourinary:  Negative for dysuria, flank pain, hematuria, pelvic pain and vaginal bleeding. Musculoskeletal:  Negative for arthralgias, back pain, gait problem, neck pain and neck stiffness. Neurological:  Negative for dizziness, tremors, weakness and numbness. Objective     DIAGNOSTIC FINDINGS:  CBC:  Lab Results   Component Value Date/Time    WBC 7.3 07/10/2016 01:52 PM    HGB 14.1 07/10/2016 01:52 PM     07/10/2016 01:52 PM     04/28/2012 02:47 PM       BMP:    Lab Results   Component Value Date/Time     02/18/2019 10:34 AM    K 3.8 02/18/2019 10:34 AM     02/18/2019 10:34 AM    CO2 28 02/18/2019 10:34 AM    BUN 17 02/18/2019 10:34 AM    CREATININE 0.89 05/24/2022 12:45 PM    CREATININE 0.69 02/18/2019 10:34 AM    GLUCOSE 88 02/18/2019 10:34 AM    GLUCOSE 87 04/28/2012 02:47 PM       HEMOGLOBIN A1C: No results found for: LABA1C    FASTING LIPID PANEL:  Lab Results   Component Value Date    CHOL 185 04/28/2012     02/18/2019    TRIG 44 04/28/2012       Physical Exam  Constitutional:       Appearance: Normal appearance. She is not toxic-appearing. HENT:      Head: Normocephalic. Right Ear: External ear normal.      Left Ear: External ear normal.      Nose: Nose normal.      Mouth/Throat:      Mouth: Mucous membranes are moist.   Eyes:      General: No scleral icterus. Right eye: No discharge. Left eye: No discharge. Conjunctiva/sclera: Conjunctivae normal.   Cardiovascular:      Rate and Rhythm: Normal rate and regular rhythm. Pulmonary:      Effort: Pulmonary effort is normal.      Breath sounds: Normal breath sounds. Musculoskeletal:         General: Normal range of motion. Cervical back: Normal range of motion and neck supple.       Right lower leg: No edema. Left lower leg: No edema. Skin:     Coloration: Skin is not jaundiced. Neurological:      Mental Status: She is alert and oriented to person, place, and time. Psychiatric:         Mood and Affect: Mood normal.         Behavior: Behavior normal.         Thought Content: Thought content normal.          An electronic signature was used to authenticate this note.     --Dave Roberto, CHARLINE - CNP

## 2023-02-15 DIAGNOSIS — I10 ESSENTIAL HYPERTENSION: ICD-10-CM

## 2023-02-16 RX ORDER — ENALAPRIL MALEATE 20 MG/1
TABLET ORAL
Qty: 30 TABLET | Refills: 3 | OUTPATIENT
Start: 2023-02-16

## 2023-03-07 ENCOUNTER — TELEMEDICINE (OUTPATIENT)
Dept: PRIMARY CARE CLINIC | Age: 58
End: 2023-03-07

## 2023-03-07 DIAGNOSIS — M54.50 CHRONIC BILATERAL LOW BACK PAIN WITHOUT SCIATICA: Primary | ICD-10-CM

## 2023-03-07 DIAGNOSIS — R10.84 GENERALIZED ABDOMINAL PAIN: ICD-10-CM

## 2023-03-07 DIAGNOSIS — G89.29 CHRONIC BILATERAL LOW BACK PAIN WITHOUT SCIATICA: Primary | ICD-10-CM

## 2023-03-07 DIAGNOSIS — R39.9 UTI SYMPTOMS: ICD-10-CM

## 2023-03-07 RX ORDER — NITROFURANTOIN 25; 75 MG/1; MG/1
100 CAPSULE ORAL 2 TIMES DAILY
Qty: 10 CAPSULE | Refills: 0 | Status: SHIPPED | OUTPATIENT
Start: 2023-03-07 | End: 2023-03-08

## 2023-03-07 SDOH — ECONOMIC STABILITY: FOOD INSECURITY: WITHIN THE PAST 12 MONTHS, YOU WORRIED THAT YOUR FOOD WOULD RUN OUT BEFORE YOU GOT MONEY TO BUY MORE.: NEVER TRUE

## 2023-03-07 SDOH — ECONOMIC STABILITY: INCOME INSECURITY: HOW HARD IS IT FOR YOU TO PAY FOR THE VERY BASICS LIKE FOOD, HOUSING, MEDICAL CARE, AND HEATING?: NOT HARD AT ALL

## 2023-03-07 SDOH — ECONOMIC STABILITY: FOOD INSECURITY: WITHIN THE PAST 12 MONTHS, THE FOOD YOU BOUGHT JUST DIDN'T LAST AND YOU DIDN'T HAVE MONEY TO GET MORE.: NEVER TRUE

## 2023-03-07 SDOH — ECONOMIC STABILITY: HOUSING INSECURITY
IN THE LAST 12 MONTHS, WAS THERE A TIME WHEN YOU DID NOT HAVE A STEADY PLACE TO SLEEP OR SLEPT IN A SHELTER (INCLUDING NOW)?: NO

## 2023-03-07 ASSESSMENT — ENCOUNTER SYMPTOMS
NAUSEA: 0
VOMITING: 0
BACK PAIN: 1
DIARRHEA: 0
ABDOMINAL PAIN: 1

## 2023-03-07 NOTE — PROGRESS NOTES
Hailee Weaver is a 65 y.o. female evaluated virtual visit via doxy on 3/7/2023. Consent:  She and/or health care decision maker is aware that that she may receive a bill for this telephone service, depending on her insurance coverage, and has provided verbal consent to proceed: Yes      Documentation:  I communicated with the patient and/or health care decision maker about back pain. Details of this discussion including any medical advice provided below      I affirm this is a Patient Initiated Episode with an Established Patient who has not had a related appointment within my department in the past 7 days or scheduled within the next 24 hours. Total Time: minutes: 11-20 minutes    Note: not billable if this call serves to triage the patient into an appointment for the relevant concern      CHARLINE Kennedy CNP                  3/7/2023    TELEHEALTH EVALUATION -- Audio/Visual (During ISU-23 public health emergency)    Patient and physician are located in their individual homes    HPI:    Monalisa Walsh (:  1965) has requested an audio only/video evaluation for the following concern(s):        Acute worsening low back pain Thursday, usually resolves with activity but has not been these last 5 days  Feels like pressure, does not radiate. Lifting and bending makes it worse  Did have some abd cramping with it Thursday and Friday, dark urine. Has since been improving. No N/V/D  Heat did help, warm bath  Using OTC motrin, no relief  Lying down does not help,       Review of Systems   Constitutional:  Negative for chills, diaphoresis and fever. Gastrointestinal:  Positive for abdominal pain. Negative for diarrhea, nausea and vomiting. Genitourinary:  Negative for difficulty urinating, dysuria, flank pain, frequency, hematuria and urgency. Musculoskeletal:  Positive for back pain. Skin:  Negative for rash. Neurological:  Negative for light-headedness.      Prior to Visit Medications Medication Sig Taking?  Authorizing Provider   nitrofurantoin, macrocrystal-monohydrate, (MACROBID) 100 MG capsule Take 1 capsule by mouth 2 times daily for 5 days Yes CHARLINE Marte CNP   clindamycin (CLEOCIN) 300 MG capsule  Yes Historical Provider, MD   enalapril (VASOTEC) 20 MG tablet TAKE 1 TABLET BY MOUTH DAILY Yes CHARLINE Marte CNP   naproxen (NAPROSYN) 500 MG tablet Take 1 tablet by mouth 2 times daily (with meals) Yes CHARLINE Marte CNP   amLODIPine (NORVASC) 5 MG tablet Take 1 tablet by mouth daily Yes CHARLINE Marte CNP       Social History     Tobacco Use    Smoking status: Every Day     Packs/day: 0.50     Years: 0.00     Pack years: 0.00     Types: Cigarettes    Smokeless tobacco: Never   Vaping Use    Vaping Use: Never used   Substance Use Topics    Alcohol use: Yes     Comment: social    Drug use: No        Past Medical History:   Diagnosis Date    Chronic back pain     Essential hypertension 2/5/2020    Fibroid uterus     Post-menopausal bleeding     Sebaceous cyst     Under care of team 05/20/2022    PCP: [facility], last visit 4/2022    UTI (urinary tract infection)         Allergies   Allergen Reactions    Codeine Nausea And Vomiting   ,   Past Medical History:   Diagnosis Date    Chronic back pain     Essential hypertension 2/5/2020    Fibroid uterus     Post-menopausal bleeding     Sebaceous cyst     Under care of team 05/20/2022    PCP: [facility], last visit 4/2022    UTI (urinary tract infection)    ,   Past Surgical History:   Procedure Laterality Date    COLONOSCOPY N/A 03/27/2019    COLONOSCOPY WITH BIOPSY performed by Ela Yan MD at [facility]    COLONOSCOPY N/A 8/11/2022    COLONOSCOPY POLYPECTOMY SNARE/COLD AND HOT BIOPSY performed by Ela Yan MD at . [address] N/A 05/24/2022    EXCISION OF BACK  SEBACEOUS CYST (N/A )    HYSTEROSCOPY  06/04/2015    and D&C    OTHER SURGICAL HISTORY  1984    vaginal warts removed    SKIN BIOPSY N/A 5/24/2022    EXCISION OF BACK  SEBACEOUS CYST performed by Dennise Tanner IV, DO at [facility]      at age 12    [address]       CBC:  Lab Results   Component Value Date/Time    WBC 7.3 07/10/2016 01:52 PM    HGB 14.1 07/10/2016 01:52 PM     07/10/2016 01:52 PM     04/28/2012 02:47 PM       BMP:    Lab Results   Component Value Date/Time     02/18/2019 10:34 AM    K 3.8 02/18/2019 10:34 AM     02/18/2019 10:34 AM    CO2 28 02/18/2019 10:34 AM    BUN 17 02/18/2019 10:34 AM    CREATININE 0.89 05/24/2022 12:45 PM    CREATININE 0.69 02/18/2019 10:34 AM    GLUCOSE 88 02/18/2019 10:34 AM    GLUCOSE 87 04/28/2012 02:47 PM       HEMOGLOBIN A1C: No results found for: LABA1C    FASTING LIPID PANEL:  Lab Results   Component Value Date    CHOL 185 04/28/2012     02/18/2019    TRIG 44 04/28/2012         RECORD REVIEW: Previous medical records were reviewed at today's visit. PHYSICAL EXAMINATION:    Vital Signs: (As obtained by patient/caregiver at home)    No flowsheet data found. Physical Exam  Constitutional:       Appearance: Normal appearance. She is not ill-appearing or toxic-appearing. HENT:      Head: Normocephalic. Right Ear: External ear normal.      Left Ear: External ear normal.      Nose: Nose normal.      Mouth/Throat:      Mouth: Mucous membranes are moist.   Eyes:      General: No scleral icterus. Right eye: No discharge. Left eye: No discharge. Conjunctiva/sclera: Conjunctivae normal.   Pulmonary:      Effort: Pulmonary effort is normal.   Musculoskeletal:      Cervical back: Normal range of motion. Skin:     Coloration: Skin is not jaundiced. Neurological:      Mental Status: She is alert and oriented to person, place, and time. Psychiatric:         Mood and Affect: Mood normal.         Behavior: Behavior normal.         Thought Content:  Thought content normal.             RECORD REVIEW: Previous medical records were reviewed at today's visit. The past family, medical and social histories were reviewed and unchanged with the exceptions of what is mentioned in this note. Due to this being a TeleHealth encounter, evaluation of the following organ systems is limited: Vitals/Constitutional/EENT/Resp/CV/GI//MS/Neuro/Skin/Heme-Lymph-Imm. ASSESSMENT/PLAN:  Rodney Canseco was seen today for other. Diagnoses and all orders for this visit:    Chronic bilateral low back pain without sciatica    Generalized abdominal pain  -     nitrofurantoin, macrocrystal-monohydrate, (MACROBID) 100 MG capsule; Take 1 capsule by mouth 2 times daily for 5 days  -     Urinalysis with Microscopic; Future  -     Culture, Urine; Future    UTI symptoms  -     nitrofurantoin, macrocrystal-monohydrate, (MACROBID) 100 MG capsule; Take 1 capsule by mouth 2 times daily for 5 days  -     Urinalysis with Microscopic; Future  -     Culture, Urine; Future    Patient with acute on chronic low back pain, associated with abdominal pain. Differential diagnosis UTI, nephrolithiasis and musculoskeletal low back pain  We will start Macrobid given symptoms associated with abdominal pain and urinary changes. Patient sees gynecology tomorrow and feels she will be able to provide a urine sample at the office, orders placed. Low suspicion for kidney stone as patient is lacking in unilateral flank pain or nausea. Discussed x-ray as patient had ongoing back pain for greater than 5 years, however advised I suspect more muscular in nature and may benefit physical therapy to improve core muscle strength and back support  Patient verbalized understanding, agrees with plan    Return for Follow up after labs resulted. An  electronic signature was used to authenticate this note.     --Dave Roberto, CHARLINE - CNP on 3/7/2023 at 1:57 PM    This note is created with the assistance of a speech-recognition program. While intending to generate a document that actually reflects the content of the visit, the document can still have some mistakes which may not have been identified and corrected by editing. 9    Kandace Walsh, was evaluated through a synchronous (real-time) audio-video encounter. The patient (or guardian if applicable) is aware that this is a billable service, which includes applicable co-pays. This Virtual Visit was conducted with patient's (and/or legal guardian's) consent. The visit was conducted pursuant to the emergency declaration under the [address] [place] [address], [address] [place] [address] authority and the [place] and [employer] General Act. Patient identification was verified, and a caregiver was present when appropriate. The patient was located in a state where the provider was licensed to provide care. Services were provided through a video synchronous discussion virtually to substitute for in-person clinic visit.

## 2023-03-08 ENCOUNTER — OFFICE VISIT (OUTPATIENT)
Dept: OBGYN CLINIC | Age: 58
End: 2023-03-08
Payer: MEDICAID

## 2023-03-08 ENCOUNTER — HOSPITAL ENCOUNTER (OUTPATIENT)
Age: 58
Setting detail: SPECIMEN
Discharge: HOME | End: 2023-03-08

## 2023-03-08 VITALS
BODY MASS INDEX: 24.71 KG/M2 | SYSTOLIC BLOOD PRESSURE: 132 MMHG | HEART RATE: 91 BPM | WEIGHT: 163 LBS | HEIGHT: 68 IN | DIASTOLIC BLOOD PRESSURE: 80 MMHG

## 2023-03-08 DIAGNOSIS — B97.7 HPV IN FEMALE: ICD-10-CM

## 2023-03-08 DIAGNOSIS — N76.0 ACUTE VAGINITIS: ICD-10-CM

## 2023-03-08 DIAGNOSIS — N87.0 DYSPLASIA OF CERVIX, LOW GRADE (CIN 1): Primary | ICD-10-CM

## 2023-03-08 PROCEDURE — 3075F SYST BP GE 130 - 139MM HG: CPT | Performed by: CLINICAL NURSE SPECIALIST

## 2023-03-08 PROCEDURE — 99213 OFFICE O/P EST LOW 20 MIN: CPT | Performed by: CLINICAL NURSE SPECIALIST

## 2023-03-08 PROCEDURE — 3079F DIAST BP 80-89 MM HG: CPT | Performed by: CLINICAL NURSE SPECIALIST

## 2023-03-08 RX ORDER — CLINDAMYCIN HYDROCHLORIDE 300 MG/1
300 CAPSULE ORAL 2 TIMES DAILY
Qty: 14 CAPSULE | Refills: 2 | Status: SHIPPED | OUTPATIENT
Start: 2023-03-08 | End: 2023-03-15

## 2023-03-08 ASSESSMENT — ENCOUNTER SYMPTOMS
GASTROINTESTINAL NEGATIVE: 1
ALLERGIC/IMMUNOLOGIC NEGATIVE: 1
RESPIRATORY NEGATIVE: 1
EYES NEGATIVE: 1

## 2023-03-08 NOTE — PROGRESS NOTES
Subjective:      Patient ID:  Sanjiv Leggett is a 65 y.o. female who presents for   Chief Complaint   Patient presents with    Abnormal Pap Smear       HPI    Patient is a 64 yo female who presents for repeat pap due to ANGELA 1 and positive HPV. Review of Systems   Constitutional:  Negative for chills and fever. HENT: Negative. Eyes: Negative. Respiratory: Negative. Cardiovascular: Negative. Gastrointestinal: Negative. Endocrine: Negative. Genitourinary:  Negative for dysuria, menstrual problem and vaginal discharge. Musculoskeletal: Negative. Skin: Negative. Allergic/Immunologic: Negative. Neurological: Negative. Hematological: Negative. Psychiatric/Behavioral: Negative. /80   Pulse 91   Ht 5' 8\" (1.727 m)   Wt 163 lb (73.9 kg)   LMP 02/28/2013   BMI 24.78 kg/m²    Patient's last menstrual period was 02/28/2013.     Family History   Problem Relation Age of Onset    High Blood Pressure Mother     Heart Disease Mother     High Blood Pressure Father     Heart Disease Father       Past Medical History:   Diagnosis Date    Chronic back pain     Essential hypertension 2/5/2020    Fibroid uterus     Post-menopausal bleeding     Sebaceous cyst     Under care of team 05/20/2022    PCP: [facility], [facility], last visit 4/2022    UTI (urinary tract infection)       Past Surgical History:   Procedure Laterality Date    COLONOSCOPY N/A 03/27/2019    COLONOSCOPY WITH BIOPSY performed by Radha Del Cid MD at [facility]    COLONOSCOPY N/A 8/11/2022    COLONOSCOPY POLYPECTOMY SNARE/COLD AND HOT BIOPSY performed by Radha Del Cid MD at [address] N/A 05/24/2022    EXCISION OF BACK  SEBACEOUS CYST (N/A )    HYSTEROSCOPY  06/04/2015    and D&C    OTHER SURGICAL HISTORY  1984    vaginal warts removed    SKIN BIOPSY N/A 5/24/2022    EXCISION OF BACK  SEBACEOUS CYST performed by Kvng Tanner IV, DO at [facility]      at age 12    [place] [address]      Social History     Socioeconomic History    Marital status: Single     Spouse name: None    Number of children: None    Years of education: None    Highest education level: None   Tobacco Use    Smoking status: Every Day     Packs/day: 0.50     Years: 0.00     Pack years: 0.00     Types: Cigarettes    Smokeless tobacco: Never   Vaping Use    Vaping Use: Never used   Substance and Sexual Activity    Alcohol use: Yes     Comment: social    Drug use: No    Sexual activity: Yes     Social Determinants of Health     Financial Resource Strain: Low Risk     Difficulty of Paying Living Expenses: Not hard at all   Food Insecurity: No Food Insecurity    Worried About Running Out of Food in the Last Year: Never true    Ran Out of Food in the Last Year: Never true   Transportation Needs: Unknown    Lack of Transportation (Non-Medical): No   Housing Stability: Unknown    Unstable Housing in the Last Year: No      Current Outpatient Medications   Medication Sig Dispense Refill    clindamycin (CLEOCIN) 300 MG capsule Take 1 capsule by mouth in the morning and 1 capsule in the evening. Do all this for 7 days. 14 capsule 2    clindamycin (CLEOCIN) 300 MG capsule       enalapril (VASOTEC) 20 MG tablet TAKE 1 TABLET BY MOUTH DAILY 30 tablet 5    naproxen (NAPROSYN) 500 MG tablet Take 1 tablet by mouth 2 times daily (with meals) 60 tablet 0    amLODIPine (NORVASC) 5 MG tablet Take 1 tablet by mouth daily 90 tablet 1     No current facility-administered medications for this visit. Objective:   Physical Exam  Vitals reviewed. Constitutional:       Appearance: Normal appearance. She is well-developed and normal weight. HENT:      Head: Normocephalic and atraumatic. Cardiovascular:      Rate and Rhythm: Normal rate and regular rhythm. Pulmonary:      Effort: Pulmonary effort is normal.      Breath sounds: Normal breath sounds.    Abdominal:      General: Bowel sounds are normal.   Genitourinary: Vagina: Vaginal discharge (scant white discharge) present. Musculoskeletal:         General: Normal range of motion. Skin:     General: Skin is warm and dry. Neurological:      Mental Status: She is alert and oriented to person, place, and time. Psychiatric:         Behavior: Behavior normal.         Thought Content: Thought content normal.         Judgment: Judgment normal.       Assessment:      Diagnosis Orders   1. Dysplasia of cervix, low grade (ANGELA 1)  PAP SMEAR      2. HPV in female  PAP SMEAR      3. Acute vaginitis  clindamycin (CLEOCIN) 300 MG capsule              Plan:      Return for as needed. Patient was seen with total face to face time of 20 minutes. More than 50% of this visit was on counseling andeducation regarding the problems listed below and her options. She was also counseled on her preventative health maintenance recommendations and follow-up.     Electronically signed by: Danyel Smith CNP

## 2023-03-09 DIAGNOSIS — R39.9 UTI SYMPTOMS: ICD-10-CM

## 2023-03-09 DIAGNOSIS — R10.84 GENERALIZED ABDOMINAL PAIN: ICD-10-CM

## 2023-03-09 LAB
BILIRUBIN URINE: NEGATIVE
CASTS UA: NORMAL /LPF (ref 0–8)
COLOR: YELLOW
EPITHELIAL CELLS UA: NORMAL /HPF (ref 0–5)
GLUCOSE UR STRIP.AUTO-MCNC: NEGATIVE MG/DL
KETONES UR STRIP.AUTO-MCNC: NEGATIVE MG/DL
LEUKOCYTE ESTERASE UR QL STRIP.AUTO: NEGATIVE
MICROORGANISM SPEC CULT: NORMAL
NITRITE UR QL STRIP.AUTO: NEGATIVE
PROT UR STRIP.AUTO-MCNC: 7.5 MG/DL (ref 5–8)
PROT UR STRIP.AUTO-MCNC: NEGATIVE MG/DL
RBC CLUMPS #/AREA URNS AUTO: NORMAL /HPF (ref 0–4)
SPECIFIC GRAVITY UA: 1.02 (ref 1–1.03)
SPECIMEN DESCRIPTION: NORMAL
TURBIDITY: CLEAR
URINE HGB: NEGATIVE
UROBILINOGEN, URINE: NORMAL
WBC UA: NORMAL /HPF (ref 0–5)

## 2023-03-21 LAB — CYTOLOGY REPORT: NORMAL

## 2023-06-02 ENCOUNTER — OFFICE VISIT (OUTPATIENT)
Dept: PRIMARY CARE CLINIC | Age: 58
End: 2023-06-02
Payer: MEDICAID

## 2023-06-02 VITALS
HEART RATE: 72 BPM | BODY MASS INDEX: 24.88 KG/M2 | WEIGHT: 163.6 LBS | SYSTOLIC BLOOD PRESSURE: 126 MMHG | OXYGEN SATURATION: 99 % | DIASTOLIC BLOOD PRESSURE: 80 MMHG

## 2023-06-02 DIAGNOSIS — F17.290 NICOTINE DEPENDENCE DUE TO VAPING TOBACCO PRODUCT: ICD-10-CM

## 2023-06-02 DIAGNOSIS — I10 ESSENTIAL HYPERTENSION: Primary | ICD-10-CM

## 2023-06-02 PROCEDURE — 3074F SYST BP LT 130 MM HG: CPT | Performed by: NURSE PRACTITIONER

## 2023-06-02 PROCEDURE — 3079F DIAST BP 80-89 MM HG: CPT | Performed by: NURSE PRACTITIONER

## 2023-06-02 PROCEDURE — 99213 OFFICE O/P EST LOW 20 MIN: CPT | Performed by: NURSE PRACTITIONER

## 2023-06-02 RX ORDER — ENALAPRIL MALEATE 20 MG/1
TABLET ORAL
Qty: 30 TABLET | Refills: 5 | Status: SHIPPED | OUTPATIENT
Start: 2023-06-02

## 2023-06-02 RX ORDER — AMLODIPINE BESYLATE 10 MG/1
10 TABLET ORAL DAILY
Qty: 90 TABLET | Refills: 1 | Status: SHIPPED | OUTPATIENT
Start: 2023-06-02 | End: 2023-08-31

## 2023-06-02 ASSESSMENT — ENCOUNTER SYMPTOMS
SHORTNESS OF BREATH: 0
CHEST TIGHTNESS: 0

## 2023-06-02 NOTE — PROGRESS NOTES
[address] PRIMARY CARE  [address] [address]  Dept: [phone]  Dept Fax: [phone]    Kandace Walsh (:  1965) is a 63 y.o. female,Established patient, here for evaluation of the following chief complaint(s):  Hypertension (Patient is here for blood pressure elevation.)         ASSESSMENT/PLAN:  1. Essential hypertension  -     amLODIPine (NORVASC) 10 MG tablet; Take 1 tablet by mouth daily, Disp-90 tablet, R-1Normal  -     enalapril (VASOTEC) 20 MG tablet; TAKE 1 TABLET BY MOUTH DAILY, Disp-30 tablet, R-5RXRD. Refill Due on 22. Queued by [employer] . Normal  2. 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  Will increase amlodipine today. Heavily encourage nicotine cessation. Patiently currently vaping with breeze, which I advised is equivalent to 150 cigarettes. If lasting 10 days, equates to roughly 15 cigarettes a day. This is actually larger than the amount of nicotine she was smoking as a 1/2 pack a day smoker. No follow-ups on file. Subjective   SUBJECTIVE/OBJECTIVE:  Had DOT exam, BP was elevated and only given 3 month clearance. Goes back for recheck on     Hypertension  Pertinent negatives include no chest pain or shortness of breath. Review of Systems   Respiratory:  Negative for chest tightness and shortness of breath. Cardiovascular:  Negative for chest pain.         Objective     DIAGNOSTIC FINDINGS:  CBC:  Lab Results   Component Value Date/Time    WBC 7.3 07/10/2016 01:52 PM    HGB 14.1 07/10/2016 01:52 PM     07/10/2016 01:52 PM     2012 02:47 PM       BMP:    Lab Results   Component Value Date/Time     2019 10:34 AM    K 3.8 2019 10:34 AM     2019 10:34 AM    CO2 28 2019 10:34 AM    BUN 17 2019 10:34

## 2023-06-26 ENCOUNTER — TELEPHONE (OUTPATIENT)
Dept: OBGYN CLINIC | Age: 58
End: 2023-06-26

## 2023-06-26 ENCOUNTER — NURSE ONLY (OUTPATIENT)
Dept: PRIMARY CARE CLINIC | Age: 58
End: 2023-06-26

## 2023-06-26 VITALS — SYSTOLIC BLOOD PRESSURE: 128 MMHG | DIASTOLIC BLOOD PRESSURE: 72 MMHG

## 2023-06-26 DIAGNOSIS — I10 ESSENTIAL HYPERTENSION: Primary | ICD-10-CM

## 2023-06-26 DIAGNOSIS — N76.0 ACUTE VAGINITIS: ICD-10-CM

## 2023-06-26 RX ORDER — CLINDAMYCIN HYDROCHLORIDE 300 MG/1
300 CAPSULE ORAL 2 TIMES DAILY
Qty: 14 CAPSULE | Refills: 2 | Status: SHIPPED | OUTPATIENT
Start: 2023-06-26

## 2023-07-03 ENCOUNTER — OFFICE VISIT (OUTPATIENT)
Dept: OBGYN CLINIC | Age: 58
End: 2023-07-03
Payer: COMMERCIAL

## 2023-07-03 VITALS
SYSTOLIC BLOOD PRESSURE: 138 MMHG | WEIGHT: 164 LBS | HEART RATE: 75 BPM | HEIGHT: 68 IN | BODY MASS INDEX: 24.86 KG/M2 | DIASTOLIC BLOOD PRESSURE: 80 MMHG

## 2023-07-03 DIAGNOSIS — R30.0 BURNING WITH URINATION: ICD-10-CM

## 2023-07-03 DIAGNOSIS — N30.00 ACUTE CYSTITIS WITHOUT HEMATURIA: ICD-10-CM

## 2023-07-03 DIAGNOSIS — R39.9 UTI SYMPTOMS: Primary | ICD-10-CM

## 2023-07-03 DIAGNOSIS — R35.0 URINARY FREQUENCY: ICD-10-CM

## 2023-07-03 LAB
BILIRUBIN, POC: ABNORMAL
BLOOD URINE, POC: ABNORMAL
CLARITY, POC: CLEAR
COLOR, POC: YELLOW
GLUCOSE URINE, POC: ABNORMAL
KETONES, POC: ABNORMAL
LEUKOCYTE EST, POC: ABNORMAL
NITRITE, POC: ABNORMAL
PH, POC: 5
PROTEIN, POC: ABNORMAL
SPECIFIC GRAVITY, POC: 1.02
UROBILINOGEN, POC: ABNORMAL

## 2023-07-03 PROCEDURE — 3075F SYST BP GE 130 - 139MM HG: CPT | Performed by: CLINICAL NURSE SPECIALIST

## 2023-07-03 PROCEDURE — 3079F DIAST BP 80-89 MM HG: CPT | Performed by: CLINICAL NURSE SPECIALIST

## 2023-07-03 PROCEDURE — 99213 OFFICE O/P EST LOW 20 MIN: CPT | Performed by: CLINICAL NURSE SPECIALIST

## 2023-07-03 PROCEDURE — 81003 URINALYSIS AUTO W/O SCOPE: CPT | Performed by: CLINICAL NURSE SPECIALIST

## 2023-07-03 RX ORDER — CIPROFLOXACIN 500 MG/1
500 TABLET, FILM COATED ORAL 2 TIMES DAILY
Qty: 6 TABLET | Refills: 0 | Status: SHIPPED | OUTPATIENT
Start: 2023-07-03 | End: 2023-07-06

## 2023-07-10 ENCOUNTER — OFFICE VISIT (OUTPATIENT)
Dept: PRIMARY CARE CLINIC | Age: 58
End: 2023-07-10
Payer: COMMERCIAL

## 2023-07-10 VITALS
SYSTOLIC BLOOD PRESSURE: 138 MMHG | BODY MASS INDEX: 24.86 KG/M2 | WEIGHT: 164 LBS | DIASTOLIC BLOOD PRESSURE: 80 MMHG | OXYGEN SATURATION: 100 % | HEIGHT: 68 IN | HEART RATE: 63 BPM

## 2023-07-10 DIAGNOSIS — I10 ESSENTIAL HYPERTENSION: Primary | ICD-10-CM

## 2023-07-10 PROCEDURE — 3078F DIAST BP <80 MM HG: CPT | Performed by: NURSE PRACTITIONER

## 2023-07-10 PROCEDURE — 99213 OFFICE O/P EST LOW 20 MIN: CPT | Performed by: NURSE PRACTITIONER

## 2023-07-10 PROCEDURE — 3075F SYST BP GE 130 - 139MM HG: CPT | Performed by: NURSE PRACTITIONER

## 2023-07-10 ASSESSMENT — ENCOUNTER SYMPTOMS
SHORTNESS OF BREATH: 0
ORTHOPNEA: 0
BLURRED VISION: 0

## 2023-07-10 NOTE — PROGRESS NOTES
[address] [facility] PRIMARY CARE  [id number] [facility] [address]  Dept: [phone]  Dept Fax: [phone]    Kandace Walsh (:  1965) is a 63 y.o. female,Established patient, here for evaluation of the following chief complaint(s):  Hypertension (Patient is here for follow up.)    Brad Muir presents today for follow-up for hypertension, last seen on 2023. Amlodipine dosing was adjusted due to elevated blood pressures during DOT testing. Currently on amlodipine 10 mg daily and enalapril 20 mg daily. Did have nurse visit on 2023 and GYN office visit 7/3/2023 with blood pressure readings both at goal.         ASSESSMENT/PLAN:  1. Essential hypertension  BP at goal, No ADRs    Return in about 6 months (around 1/10/2024) for HTN. Subjective   SUBJECTIVE/OBJECTIVE:      Sates she has not smoked since New years, used to only smoke when drinking  Taking enalapril for blood pressure, prefers to take in the evening along with Norvasc  Missed both her medications yesterday, did take them today at 6 AM        Hypertension  This is a chronic problem. The problem is unchanged. The problem is uncontrolled. Pertinent negatives include no anxiety, blurred vision, chest pain, neck pain, orthopnea, palpitations, peripheral edema or shortness of breath. Past treatments include ACE inhibitors. The current treatment provides mild improvement. Review of Systems   Constitutional:  Negative for fatigue and fever. Eyes:  Negative for blurred vision. Respiratory:  Negative for shortness of breath. Cardiovascular:  Negative for chest pain, palpitations, orthopnea and leg swelling. Musculoskeletal:  Negative for neck pain. Neurological:  Negative for dizziness.         Objective     DIAGNOSTIC FINDINGS:  CBC:  Lab Results   Component Value Date/Time    WBC 7.3 07/10/2016 01:52 PM    HGB 14.1 07/10/2016 01:52 PM     07/10/2016

## 2023-09-20 ENCOUNTER — TELEPHONE (OUTPATIENT)
Dept: OBGYN CLINIC | Age: 58
End: 2023-09-20

## 2023-09-20 RX ORDER — CLINDAMYCIN HYDROCHLORIDE 300 MG/1
300 CAPSULE ORAL 2 TIMES DAILY
Qty: 14 CAPSULE | Refills: 0 | Status: SHIPPED | OUTPATIENT
Start: 2023-09-20 | End: 2023-09-27

## 2023-09-20 NOTE — TELEPHONE ENCOUNTER
Patient reports vaginal discharge and odor and is requesting for medications. Patient reports that the clindamycin works the best. Please send to [email or web address].

## 2024-01-04 ENCOUNTER — TELEPHONE (OUTPATIENT)
Dept: OBGYN CLINIC | Age: 59
End: 2024-01-04

## 2024-01-04 RX ORDER — CLINDAMYCIN PHOSPHATE 20 MG/G
CREAM VAGINAL
Qty: 1 EACH | Refills: 2 | Status: SHIPPED | OUTPATIENT
Start: 2024-01-04 | End: 2024-01-11

## 2024-01-08 DIAGNOSIS — N76.0 ACUTE VAGINITIS: Primary | ICD-10-CM

## 2024-01-08 RX ORDER — CLINDAMYCIN HYDROCHLORIDE 300 MG/1
300 CAPSULE ORAL 2 TIMES DAILY
Qty: 14 CAPSULE | Refills: 0 | Status: SHIPPED | OUTPATIENT
Start: 2024-01-08 | End: 2024-01-15

## 2024-01-08 NOTE — PROGRESS NOTES
Visit Information    Have you changed or started any medications since your last visit including any over-the-counter medicines, vitamins, or herbal medicines? no   Have you stopped taking any of your medications? Is so, why? -  no  Are you having any side effects from any of your medications? - no    Have you seen any other physician or provider since your last visit?  no   Have you had any other diagnostic tests since your last visit?  no   Have you been seen in the emergency room and/or had an admission in a hospital since we last saw you?  no   Have you had your routine dental cleaning in the past 6 months?  no     Do you have an active MyChart account? If no, what is the barrier?   Yes    Patient Care Team:  CHARLINE Stein CNP as PCP - General (Family Medicine)  CHARLINE Stein CNP as PCP - [facility] Provider    Medical History Review  Past Medical, Family, and Social History reviewed and does not contribute to the patient presenting condition    Health Maintenance   Topic Date Due    DTaP/Tdap/Td vaccine (1 - Tdap) 09/30/2020 (Originally 4/5/1977)    Flu vaccine (1) 09/30/2020 (Originally 9/1/2019)    Shingles Vaccine (1 of 2) 09/30/2020 (Originally 4/5/2016)    Pneumococcal 0-64 years Vaccine (1 of 1 - PPSV23) 09/30/2020 (Originally 4/5/1972)    Potassium monitoring  02/18/2020    Creatinine monitoring  02/18/2020    Colon cancer screen colonoscopy  03/27/2020    Breast cancer screen  09/04/2020    Cervical cancer screen  07/17/2021    Lipid screen  02/18/2024    HIV screen  Completed [facility]

## 2024-05-07 ENCOUNTER — HOSPITAL ENCOUNTER (OUTPATIENT)
Age: 59
Discharge: HOME | End: 2024-05-07
Payer: COMMERCIAL

## 2024-05-07 ENCOUNTER — OFFICE VISIT (OUTPATIENT)
Dept: PRIMARY CARE CLINIC | Age: 59
End: 2024-05-07

## 2024-05-07 ENCOUNTER — HOSPITAL ENCOUNTER (OUTPATIENT)
Age: 59
Setting detail: SPECIMEN
Discharge: HOME | End: 2024-05-07

## 2024-05-07 ENCOUNTER — OFFICE VISIT (OUTPATIENT)
Dept: OBGYN CLINIC | Age: 59
End: 2024-05-07
Payer: COMMERCIAL

## 2024-05-07 VITALS
OXYGEN SATURATION: 100 % | HEART RATE: 67 BPM | BODY MASS INDEX: 25.7 KG/M2 | DIASTOLIC BLOOD PRESSURE: 77 MMHG | SYSTOLIC BLOOD PRESSURE: 131 MMHG | WEIGHT: 169 LBS

## 2024-05-07 VITALS
HEART RATE: 95 BPM | BODY MASS INDEX: 25.85 KG/M2 | SYSTOLIC BLOOD PRESSURE: 136 MMHG | WEIGHT: 170 LBS | DIASTOLIC BLOOD PRESSURE: 80 MMHG

## 2024-05-07 DIAGNOSIS — B36.0 TINEA VERSICOLOR: ICD-10-CM

## 2024-05-07 DIAGNOSIS — I10 ESSENTIAL HYPERTENSION: Primary | ICD-10-CM

## 2024-05-07 DIAGNOSIS — Z11.59 NEED FOR HEPATITIS C SCREENING TEST: ICD-10-CM

## 2024-05-07 DIAGNOSIS — Z01.419 WELL WOMAN EXAM: Primary | ICD-10-CM

## 2024-05-07 DIAGNOSIS — Z12.31 SCREENING MAMMOGRAM FOR BREAST CANCER: ICD-10-CM

## 2024-05-07 DIAGNOSIS — I10 ESSENTIAL HYPERTENSION: ICD-10-CM

## 2024-05-07 DIAGNOSIS — N76.0 ACUTE VAGINITIS: ICD-10-CM

## 2024-05-07 DIAGNOSIS — Z13.220 LIPID SCREENING: ICD-10-CM

## 2024-05-07 DIAGNOSIS — Z11.51 SCREENING FOR HPV (HUMAN PAPILLOMAVIRUS): ICD-10-CM

## 2024-05-07 LAB
ALBUMIN SERPL-MCNC: 4.4 G/DL (ref 3.5–5.2)
ALBUMIN/GLOB SERPL: 1 {RATIO} (ref 1–2.5)
ALP SERPL-CCNC: 50 U/L (ref 35–104)
ALT SERPL-CCNC: 10 U/L (ref 10–35)
ANION GAP SERPL CALCULATED.3IONS-SCNC: 10 MMOL/L (ref 9–16)
AST SERPL-CCNC: 20 U/L (ref 10–35)
BILIRUB SERPL-MCNC: 0.2 MG/DL (ref 0–1.2)
BUN SERPL-MCNC: 11 MG/DL (ref 6–20)
CALCIUM SERPL-MCNC: 9 MG/DL (ref 8.6–10.4)
CHLORIDE SERPL-SCNC: 106 MMOL/L (ref 98–107)
CHOLEST SERPL-MCNC: 216 MG/DL (ref 0–199)
CHOLESTEROL/HDL RATIO: 2
CO2 SERPL-SCNC: 26 MMOL/L (ref 20–31)
CREAT SERPL-MCNC: 0.9 MG/DL (ref 0.5–0.9)
GFR, ESTIMATED: 78 ML/MIN/1.73M2
GLUCOSE SERPL-MCNC: 84 MG/DL (ref 74–99)
HCV AB SERPL QL IA: NONREACTIVE
HDLC SERPL-MCNC: 98 MG/DL
LDLC SERPL CALC-MCNC: 97 MG/DL (ref 0–100)
PROT SERPL-MCNC: 7.4 G/DL (ref 6.6–8.7)
SODIUM SERPL-SCNC: 142 MMOL/L (ref 136–145)
TRIGL SERPL-MCNC: 105 MG/DL (ref 0–149)
VLDLC SERPL CALC-MCNC: 21 MG/DL

## 2024-05-07 PROCEDURE — 99396 PREV VISIT EST AGE 40-64: CPT | Performed by: CLINICAL NURSE SPECIALIST

## 2024-05-07 PROCEDURE — 3075F SYST BP GE 130 - 139MM HG: CPT | Performed by: NURSE PRACTITIONER

## 2024-05-07 PROCEDURE — 3079F DIAST BP 80-89 MM HG: CPT | Performed by: CLINICAL NURSE SPECIALIST

## 2024-05-07 PROCEDURE — 86803 HEPATITIS C AB TEST: CPT

## 2024-05-07 PROCEDURE — 36415 COLL VENOUS BLD VENIPUNCTURE: CPT

## 2024-05-07 PROCEDURE — 80053 COMPREHEN METABOLIC PANEL: CPT

## 2024-05-07 PROCEDURE — 80061 LIPID PANEL: CPT

## 2024-05-07 PROCEDURE — 3078F DIAST BP <80 MM HG: CPT | Performed by: NURSE PRACTITIONER

## 2024-05-07 PROCEDURE — 99214 OFFICE O/P EST MOD 30 MIN: CPT | Performed by: NURSE PRACTITIONER

## 2024-05-07 PROCEDURE — 3075F SYST BP GE 130 - 139MM HG: CPT | Performed by: CLINICAL NURSE SPECIALIST

## 2024-05-07 RX ORDER — ENALAPRIL MALEATE 20 MG/1
TABLET ORAL
Qty: 90 TABLET | Refills: 1 | Status: SHIPPED | OUTPATIENT
Start: 2024-05-07

## 2024-05-07 RX ORDER — AMLODIPINE BESYLATE 10 MG/1
10 TABLET ORAL DAILY
Qty: 90 TABLET | Refills: 1 | Status: SHIPPED | OUTPATIENT
Start: 2024-05-07 | End: 2024-11-03

## 2024-05-07 RX ORDER — PRENATAL VIT 91/IRON/FOLIC/DHA 28-975-200
COMBINATION PACKAGE (EA) ORAL
Qty: 42 G | Refills: 0 | Status: SHIPPED | OUTPATIENT
Start: 2024-05-07

## 2024-05-07 RX ORDER — CLINDAMYCIN HYDROCHLORIDE 300 MG/1
300 CAPSULE ORAL 2 TIMES DAILY
Qty: 14 CAPSULE | Refills: 0 | Status: SHIPPED | OUTPATIENT
Start: 2024-05-07 | End: 2024-05-14

## 2024-05-07 SDOH — ECONOMIC STABILITY: FOOD INSECURITY: WITHIN THE PAST 12 MONTHS, THE FOOD YOU BOUGHT JUST DIDN'T LAST AND YOU DIDN'T HAVE MONEY TO GET MORE.: NEVER TRUE

## 2024-05-07 SDOH — ECONOMIC STABILITY: FOOD INSECURITY: WITHIN THE PAST 12 MONTHS, YOU WORRIED THAT YOUR FOOD WOULD RUN OUT BEFORE YOU GOT MONEY TO BUY MORE.: NEVER TRUE

## 2024-05-07 SDOH — ECONOMIC STABILITY: INCOME INSECURITY: HOW HARD IS IT FOR YOU TO PAY FOR THE VERY BASICS LIKE FOOD, HOUSING, MEDICAL CARE, AND HEATING?: NOT HARD AT ALL

## 2024-05-07 ASSESSMENT — PATIENT HEALTH QUESTIONNAIRE - PHQ9
SUM OF ALL RESPONSES TO PHQ QUESTIONS 1-9: 0
2. FEELING DOWN, DEPRESSED OR HOPELESS: NOT AT ALL
1. LITTLE INTEREST OR PLEASURE IN DOING THINGS: NOT AT ALL
SUM OF ALL RESPONSES TO PHQ QUESTIONS 1-9: 0
SUM OF ALL RESPONSES TO PHQ9 QUESTIONS 1 & 2: 0

## 2024-05-07 ASSESSMENT — ENCOUNTER SYMPTOMS
BLURRED VISION: 0
ORTHOPNEA: 0
SHORTNESS OF BREATH: 0

## 2024-05-07 NOTE — PROGRESS NOTES
[facility]  [facility] PRIMARY CARE  [facility] CARE [address] MA[address] FLOOR  [place] [address]  Dept: [phone]  Dept Fax: [phone]    Kandace Walsh (:  1965) is a 59 y.o. female,Established patient, here for evaluation of the following chief complaint(s):  Medication Refill (Med refill )      Assessment & Plan   ASSESSMENT/PLAN:  1. Essential hypertension  -     Comprehensive Metabolic Panel; Future  -     amLODIPine (NORVASC) 10 MG tablet; Take 1 tablet by mouth daily, Disp-90 tablet, R-1Normal  -     enalapril (VASOTEC) 20 MG tablet; TAKE 1 TABLET BY MOUTH DAILY, Disp-90 tablet, R-1RXRD. Refill Due on 22. Queued by [facility] .Normal  2. Lipid screening  -     Lipid, Fasting; Future  3. Need for hepatitis C screening test  -     Hepatitis C Antibody; Future  4. Tinea versicolor  -     terbinafine (LAMISIL) 1 % cream; Apply to affected area(s) and immediate surrounding skin twice daily for 1 to 2 weeks, Disp-42 g, R-0, Normal    Blood pressure remains at goal, continue current treatment  Patient advised she has not completed ordered labs to monitor kidney function in the last 2 years, reordered labs today and will also continue with lipid screening and hepatitis C screening.  Patient agrees.  Patient with hypopigmented lesions to bilateral lower extremities that appears consistent with tinea versicolor, will start topical terbinafine, patient verbalized understanding.    Return in about 6 months (around 2024) for HTN.         Subjective   SUBJECTIVE/OBJECTIVE:  Last home BP was 140 SBP, 80's DPB. Sometimes lower than that.    Sates she has not smoked since New years, used to only smoke when drinking  Taking both meds in the AM        Hypertension  This is a chronic problem. The problem is unchanged. The problem is uncontrolled. Pertinent negatives include no anxiety, blurred vision, chest pain, neck pain, orthopnea, palpitations, peripheral edema or shortness of

## 2024-05-09 LAB
HPV I/H RISK 4 DNA CVX QL NAA+PROBE: NOT DETECTED
HPV SAMPLE: NORMAL
HPV, INTERPRETATION: NORMAL
HPV16 DNA CVX QL NAA+PROBE: NOT DETECTED
HPV18 DNA CVX QL NAA+PROBE: NOT DETECTED
SPECIMEN DESCRIPTION: NORMAL

## 2024-05-16 LAB — CYTOLOGY REPORT: NORMAL

## 2024-10-10 ENCOUNTER — TELEPHONE (OUTPATIENT)
Dept: OBGYN CLINIC | Age: 59
End: 2024-10-10

## 2024-10-10 RX ORDER — CLINDAMYCIN HCL 300 MG
300 CAPSULE ORAL 2 TIMES DAILY
Qty: 14 CAPSULE | Refills: 0 | Status: SHIPPED | OUTPATIENT
Start: 2024-10-10 | End: 2024-10-17

## 2025-02-12 ENCOUNTER — HOSPITAL ENCOUNTER (OUTPATIENT)
Dept: WOMENS IMAGING | Age: 60
Discharge: HOME | End: 2025-02-14
Payer: COMMERCIAL

## 2025-02-12 DIAGNOSIS — Z12.31 SCREENING MAMMOGRAM FOR BREAST CANCER: ICD-10-CM

## 2025-02-12 PROCEDURE — 77067 SCR MAMMO BI INCL CAD: CPT

## 2025-02-14 ENCOUNTER — OFFICE VISIT (OUTPATIENT)
Dept: PRIMARY CARE CLINIC | Age: 60
End: 2025-02-14
Payer: COMMERCIAL

## 2025-02-14 VITALS
TEMPERATURE: 97.3 F | BODY MASS INDEX: 27.43 KG/M2 | HEART RATE: 65 BPM | DIASTOLIC BLOOD PRESSURE: 71 MMHG | HEIGHT: 68 IN | WEIGHT: 181 LBS | SYSTOLIC BLOOD PRESSURE: 136 MMHG | OXYGEN SATURATION: 100 %

## 2025-02-14 DIAGNOSIS — Z23 NEED FOR VACCINATION: ICD-10-CM

## 2025-02-14 DIAGNOSIS — M25.50 ARTHRALGIA OF MULTIPLE SITES: ICD-10-CM

## 2025-02-14 DIAGNOSIS — R35.1 NOCTURIA: ICD-10-CM

## 2025-02-14 DIAGNOSIS — I10 ESSENTIAL HYPERTENSION: Primary | ICD-10-CM

## 2025-02-14 DIAGNOSIS — Z13.220 LIPID SCREENING: ICD-10-CM

## 2025-02-14 PROCEDURE — 90471 IMMUNIZATION ADMIN: CPT | Performed by: NURSE PRACTITIONER

## 2025-02-14 PROCEDURE — 90677 PCV20 VACCINE IM: CPT | Performed by: NURSE PRACTITIONER

## 2025-02-14 PROCEDURE — 99214 OFFICE O/P EST MOD 30 MIN: CPT | Performed by: NURSE PRACTITIONER

## 2025-02-14 PROCEDURE — 3078F DIAST BP <80 MM HG: CPT | Performed by: NURSE PRACTITIONER

## 2025-02-14 PROCEDURE — 3075F SYST BP GE 130 - 139MM HG: CPT | Performed by: NURSE PRACTITIONER

## 2025-02-14 RX ORDER — AMLODIPINE BESYLATE 10 MG/1
10 TABLET ORAL DAILY
Qty: 90 TABLET | Refills: 1 | Status: SHIPPED | OUTPATIENT
Start: 2025-02-14 | End: 2025-08-13

## 2025-02-14 RX ORDER — ENALAPRIL MALEATE 20 MG/1
TABLET ORAL
Qty: 90 TABLET | Refills: 1 | Status: SHIPPED | OUTPATIENT
Start: 2025-02-14

## 2025-02-14 RX ORDER — NAPROXEN 500 MG/1
500 TABLET ORAL 2 TIMES DAILY WITH MEALS
Qty: 60 TABLET | Refills: 1 | Status: SHIPPED | OUTPATIENT
Start: 2025-02-14

## 2025-02-14 SDOH — ECONOMIC STABILITY: FOOD INSECURITY: WITHIN THE PAST 12 MONTHS, YOU WORRIED THAT YOUR FOOD WOULD RUN OUT BEFORE YOU GOT MONEY TO BUY MORE.: NEVER TRUE

## 2025-02-14 SDOH — ECONOMIC STABILITY: FOOD INSECURITY: WITHIN THE PAST 12 MONTHS, THE FOOD YOU BOUGHT JUST DIDN'T LAST AND YOU DIDN'T HAVE MONEY TO GET MORE.: NEVER TRUE

## 2025-02-14 ASSESSMENT — PATIENT HEALTH QUESTIONNAIRE - PHQ9
SUM OF ALL RESPONSES TO PHQ QUESTIONS 1-9: 0
1. LITTLE INTEREST OR PLEASURE IN DOING THINGS: NOT AT ALL
2. FEELING DOWN, DEPRESSED OR HOPELESS: NOT AT ALL
SUM OF ALL RESPONSES TO PHQ QUESTIONS 1-9: 0
SUM OF ALL RESPONSES TO PHQ9 QUESTIONS 1 & 2: 0
SUM OF ALL RESPONSES TO PHQ QUESTIONS 1-9: 0
SUM OF ALL RESPONSES TO PHQ QUESTIONS 1-9: 0

## 2025-02-14 NOTE — PROGRESS NOTES
[address] include 234 beds at the [facility] CARE [address] MAIN [facility] [address]   2/14/2025    Kandace Walsh is a 59 y.o. female who presents today for her medical conditions and/or complaints as noted below.    Kandace Walsh is scheduled today for Hypertension  .      HPI:     History of Present Illness  The patient is a 59-year-old female here today for a follow-up for hypertension, nocturia, and left knee pain. She was last seen on 05/07/2024. She had cervical cancer screening completed on 05/07/2024 with her OB/GYN.    She reports experiencing frequent urination at night, necessitating approximately six bathroom visits per night. This issue persists despite her efforts to reduce fluid intake before bedtime. Her work schedule typically involves late nights, often resulting in her not consuming any fluids after 10 PM. She does not experience any leg swelling. She has been wearing compression socks for the past three days, which are scheduled to be removed on 27th. She has a history of one childbirth 40 years ago. Her work involves prolonged periods of standing, and she does not report any urinary urgency during her shifts. She consumes a significant amount of caffeine during her daytime [employer] job, which she believes may contribute to her sleep disturbances. She does not report any chest pain, pressure, or hematuria. She also does not experience night sweats and notes that her hot flashes have ceased.    She is currently on amlodipine and enalapril for her hypertension, which is well controlled. She is due for refills of these medications.    She reports experiencing generalized body aches upon waking, particularly after prolonged periods of standing or sitting. She requests a refill of her naproxen prescription, which she has not had in some time. She describes the pain as sharp, particularly in her left knee, and notes that it often causes her to stop moving.    SOCIAL HISTORY  She does not report any alcohol intake.

## 2025-04-07 ENCOUNTER — TELEPHONE (OUTPATIENT)
Dept: OBGYN CLINIC | Age: 60
End: 2025-04-07

## 2025-04-07 RX ORDER — CLINDAMYCIN HYDROCHLORIDE 300 MG/1
300 CAPSULE ORAL 2 TIMES DAILY
Qty: 14 CAPSULE | Refills: 0 | Status: SHIPPED | OUTPATIENT
Start: 2025-04-07 | End: 2025-04-14

## 2025-04-07 NOTE — TELEPHONE ENCOUNTER
Patient reports vaginal odor and discharge and is requesting a script of clindamycin send to [facility] pharmacy on [facility]. Patient has annual scheduled on 05/08/2025.

## 2025-04-09 ENCOUNTER — TELEPHONE (OUTPATIENT)
Dept: PRIMARY CARE CLINIC | Age: 60
End: 2025-04-09

## 2025-04-09 ENCOUNTER — OFFICE VISIT (OUTPATIENT)
Dept: PRIMARY CARE CLINIC | Age: 60
End: 2025-04-09

## 2025-04-09 VITALS
BODY MASS INDEX: 27.34 KG/M2 | TEMPERATURE: 97 F | WEIGHT: 179.8 LBS | OXYGEN SATURATION: 100 % | SYSTOLIC BLOOD PRESSURE: 175 MMHG | DIASTOLIC BLOOD PRESSURE: 82 MMHG | HEART RATE: 55 BPM

## 2025-04-09 DIAGNOSIS — M79.621 PAIN OF RIGHT UPPER ARM: Primary | ICD-10-CM

## 2025-04-09 DIAGNOSIS — M25.611 DECREASED RANGE OF MOTION OF RIGHT SHOULDER: ICD-10-CM

## 2025-04-09 DIAGNOSIS — M25.511 ACUTE PAIN OF RIGHT SHOULDER: Primary | ICD-10-CM

## 2025-04-09 DIAGNOSIS — M25.50 ARTHRALGIA OF MULTIPLE SITES: ICD-10-CM

## 2025-04-09 DIAGNOSIS — M79.621 PAIN OF RIGHT UPPER ARM: ICD-10-CM

## 2025-04-09 DIAGNOSIS — L65.9 ALOPECIA: ICD-10-CM

## 2025-04-09 RX ORDER — MINOXIDIL 50 MG/G
AEROSOL, FOAM TOPICAL
Qty: 60 G | Refills: 1 | Status: SHIPPED | OUTPATIENT
Start: 2025-04-09

## 2025-04-09 RX ORDER — NAPROXEN 500 MG/1
500 TABLET ORAL 2 TIMES DAILY WITH MEALS
Qty: 60 TABLET | Refills: 1 | Status: SHIPPED | OUTPATIENT
Start: 2025-04-09

## 2025-04-09 RX ORDER — LIDOCAINE 50 MG/G
1 PATCH TOPICAL DAILY
Qty: 30 PATCH | Refills: 0 | Status: SHIPPED | OUTPATIENT
Start: 2025-04-09 | End: 2025-05-09

## 2025-04-09 NOTE — TELEPHONE ENCOUNTER
Needs an updated order. US Extr Right Joint Non Vasc Complete. Once ordered please contact pt to inform her so she can schedule.  Please advise

## 2025-04-09 NOTE — PROGRESS NOTES
applicable) and other individuals in attendance with the patient were advised that Artificial Intelligence will be utilized during this visit to record, process the conversation to generate a clinical note, and support improvement of the AI technology. The patient (or guardian, if applicable) and other individuals in attendance at the appointment consented to the use of AI, including the recording.      Reviewed health maintenance, prior labs and imaging.   Patient given educational materials - see patient instructions.    Discussed use, benefit, and side effects of prescribed medications. Barriers to medication compliance addressed.   All patient questions answered.  Pt voiced understanding to plan of care.   Instructed to continue medications as discussed, healthy diet and exercise.    Patient agreed with treatment plan. Follow up as directed below.     This note is created with the assistance of a speech-recognition program. While intending to generate a document that actually reflects the content of the visit, no guarantees can be provided that every mistake has been identified and corrected by editing.    Electronically signed by CHARLINE Rizo CNP, APRN-CNP on 4/9/2025 at 12:24 PM

## 2025-05-08 ENCOUNTER — HOSPITAL ENCOUNTER (OUTPATIENT)
Age: 60
Setting detail: SPECIMEN
Discharge: HOME | End: 2025-05-08

## 2025-05-08 ENCOUNTER — OFFICE VISIT (OUTPATIENT)
Dept: OBGYN CLINIC | Age: 60
End: 2025-05-08
Payer: COMMERCIAL

## 2025-05-08 VITALS
WEIGHT: 179 LBS | BODY MASS INDEX: 27.13 KG/M2 | SYSTOLIC BLOOD PRESSURE: 116 MMHG | DIASTOLIC BLOOD PRESSURE: 84 MMHG | HEIGHT: 68 IN | HEART RATE: 90 BPM

## 2025-05-08 DIAGNOSIS — Z78.0 POSTMENOPAUSAL: ICD-10-CM

## 2025-05-08 DIAGNOSIS — N76.0 ACUTE VAGINITIS: ICD-10-CM

## 2025-05-08 DIAGNOSIS — Z01.419 WELL WOMAN EXAM: Primary | ICD-10-CM

## 2025-05-08 DIAGNOSIS — Z12.31 SCREENING MAMMOGRAM FOR BREAST CANCER: ICD-10-CM

## 2025-05-08 DIAGNOSIS — R82.90 ABNORMAL URINE ODOR: ICD-10-CM

## 2025-05-08 DIAGNOSIS — N89.8 VAGINAL DISCHARGE: ICD-10-CM

## 2025-05-08 LAB
CANDIDA SPECIES: NEGATIVE
GARDNERELLA VAGINALIS: POSITIVE
SOURCE: ABNORMAL
TRICHOMONAS: NEGATIVE

## 2025-05-08 PROCEDURE — 99396 PREV VISIT EST AGE 40-64: CPT | Performed by: CLINICAL NURSE SPECIALIST

## 2025-05-08 PROCEDURE — 3074F SYST BP LT 130 MM HG: CPT | Performed by: CLINICAL NURSE SPECIALIST

## 2025-05-08 PROCEDURE — 99213 OFFICE O/P EST LOW 20 MIN: CPT | Performed by: CLINICAL NURSE SPECIALIST

## 2025-05-08 PROCEDURE — 3079F DIAST BP 80-89 MM HG: CPT | Performed by: CLINICAL NURSE SPECIALIST

## 2025-05-08 RX ORDER — CLINDAMYCIN HYDROCHLORIDE 300 MG/1
300 CAPSULE ORAL 2 TIMES DAILY
Qty: 14 CAPSULE | Refills: 1 | Status: SHIPPED | OUTPATIENT
Start: 2025-05-08 | End: 2025-05-15

## 2025-05-08 NOTE — PROGRESS NOTES
[facility], [facility] OBSTETRICS & GYNECOLOGY  [address] [facility] [address] [place] [id number]  Dept: [phone]        DATE OF VISIT:  25        History and Physical    Kandace Walsh    :  1965  CHIEF COMPLAINT:    Chief Complaint   Patient presents with    Annual Exam                    Kandace Walsh is a 60 y.o. female who presents for annual well woman exam.  Patient reports that she is having an strong odor to her urine and would like to see if she has UTI.     The patient was seen and examined.  Per the patient bowels are regular. She has no voiding complaints.  She denies any bloating as well as vaginal discharge.    Chaperone for Intimate Exam  Chaperone was offered as part of the rooming process. Patient declined and agrees to continue with exam without a chaperone.  Chaperone: none     _____________________________________________________________________  Past Medical History:   Diagnosis Date    Chronic back pain     Essential hypertension 2020    Fibroid uterus     Post-menopausal bleeding     Sebaceous cyst     Under care of team 2022    [facility], last visit 2022    UTI (urinary tract infection)                                                                    Past Surgical History:   Procedure Laterality Date    COLONOSCOPY N/A 2019    COLONOSCOPY WITH BIOPSY performed by Kevin Borrego MD at [facility]    COLONOSCOPY N/A 2022    COLONOSCOPY POLYPECTOMY SNARE/COLD AND HOT BIOPSY performed by Kevin Borrego MD at [facility] ENDO    EXCISION/BIOPSY N/A 2022    EXCISION OF BACK  SEBACEOUS CYST (N/A )    HYSTEROSCOPY  2015    and D&C    OTHER SURGICAL HISTORY      vaginal warts removed    SKIN BIOPSY N/A 2022    EXCISION OF BACK  SEBACEOUS CYST performed by Steven Tanner IV, DO at [facility] OR    TONSILLECTOMY      at age 6    TUBAL LIGATION  1987     Family History   Problem Relation Age

## 2025-05-10 LAB
MICROORGANISM SPEC CULT: ABNORMAL
SERVICE CMNT-IMP: ABNORMAL
SPECIMEN DESCRIPTION: ABNORMAL

## 2025-05-12 ENCOUNTER — RESULTS FOLLOW-UP (OUTPATIENT)
Dept: OBGYN | Age: 60
End: 2025-05-12

## 2025-05-12 RX ORDER — SULFAMETHOXAZOLE AND TRIMETHOPRIM 800; 160 MG/1; MG/1
1 TABLET ORAL 2 TIMES DAILY
Qty: 6 TABLET | Refills: 0 | Status: SHIPPED | OUTPATIENT
Start: 2025-05-12 | End: 2025-05-15

## 2025-07-21 ENCOUNTER — TELEPHONE (OUTPATIENT)
Dept: OBGYN CLINIC | Age: 60
End: 2025-07-21

## 2025-07-22 RX ORDER — CLINDAMYCIN HYDROCHLORIDE 300 MG/1
300 CAPSULE ORAL 2 TIMES DAILY
Qty: 14 CAPSULE | Refills: 2 | Status: SHIPPED | OUTPATIENT
Start: 2025-07-22 | End: 2025-07-29

## 2025-07-29 DIAGNOSIS — I10 ESSENTIAL HYPERTENSION: ICD-10-CM

## 2025-07-29 RX ORDER — ENALAPRIL MALEATE 20 MG/1
TABLET ORAL
Qty: 90 TABLET | Refills: 1 | Status: SHIPPED | OUTPATIENT
Start: 2025-07-29

## 2025-07-29 RX ORDER — AMLODIPINE BESYLATE 10 MG/1
10 TABLET ORAL DAILY
Qty: 90 TABLET | Refills: 1 | Status: SHIPPED | OUTPATIENT
Start: 2025-07-29 | End: 2026-01-25

## 2025-08-13 ENCOUNTER — OFFICE VISIT (OUTPATIENT)
Dept: PRIMARY CARE CLINIC | Age: 60
End: 2025-08-13
Payer: COMMERCIAL

## 2025-08-13 ENCOUNTER — HOSPITAL ENCOUNTER (OUTPATIENT)
Dept: LAB | Age: 60
Discharge: HOME | End: 2025-08-13
Payer: COMMERCIAL

## 2025-08-13 ENCOUNTER — RESULTS FOLLOW-UP (OUTPATIENT)
Dept: PRIMARY CARE CLINIC | Age: 60
End: 2025-08-13

## 2025-08-13 VITALS
OXYGEN SATURATION: 100 % | TEMPERATURE: 97 F | HEIGHT: 68 IN | SYSTOLIC BLOOD PRESSURE: 134 MMHG | BODY MASS INDEX: 26.07 KG/M2 | DIASTOLIC BLOOD PRESSURE: 78 MMHG | HEART RATE: 62 BPM | WEIGHT: 172 LBS

## 2025-08-13 DIAGNOSIS — Z12.11 COLON CANCER SCREENING: ICD-10-CM

## 2025-08-13 DIAGNOSIS — Z13.220 LIPID SCREENING: ICD-10-CM

## 2025-08-13 DIAGNOSIS — D12.6 SERRATED ADENOMA OF COLON: ICD-10-CM

## 2025-08-13 DIAGNOSIS — L72.3 SEBACEOUS CYST: ICD-10-CM

## 2025-08-13 DIAGNOSIS — R35.1 NOCTURIA: ICD-10-CM

## 2025-08-13 DIAGNOSIS — I10 ESSENTIAL HYPERTENSION: Primary | ICD-10-CM

## 2025-08-13 DIAGNOSIS — I10 ESSENTIAL HYPERTENSION: ICD-10-CM

## 2025-08-13 LAB
ALBUMIN SERPL-MCNC: 4.2 G/DL (ref 3.5–5.2)
ALBUMIN/GLOB SERPL: 1.4 {RATIO} (ref 1–2.5)
ALP SERPL-CCNC: 53 U/L (ref 35–104)
ALT SERPL-CCNC: 11 U/L (ref 10–35)
ANION GAP SERPL CALCULATED.3IONS-SCNC: 9 MMOL/L (ref 9–16)
AST SERPL-CCNC: 21 U/L (ref 10–35)
BILIRUB SERPL-MCNC: 0.6 MG/DL (ref 0–1.2)
BUN SERPL-MCNC: 12 MG/DL (ref 6–20)
CALCIUM SERPL-MCNC: 9.2 MG/DL (ref 8.6–10.4)
CHLORIDE SERPL-SCNC: 103 MMOL/L (ref 98–107)
CHOLEST SERPL-MCNC: 190 MG/DL (ref 0–199)
CHOLESTEROL/HDL RATIO: 2.2
CO2 SERPL-SCNC: 26 MMOL/L (ref 20–31)
CREAT SERPL-MCNC: 0.8 MG/DL (ref 0.6–0.9)
GFR, ESTIMATED: 85 ML/MIN/1.73M2
GLUCOSE SERPL-MCNC: 110 MG/DL (ref 74–99)
HDLC SERPL-MCNC: 85 MG/DL
LDLC SERPL CALC-MCNC: 92 MG/DL (ref 0–100)
POTASSIUM SERPL-SCNC: 4.1 MMOL/L (ref 3.7–5.3)
PROT SERPL-MCNC: 7.2 G/DL (ref 6.6–8.7)
SODIUM SERPL-SCNC: 138 MMOL/L (ref 136–145)
TRIGL SERPL-MCNC: 66 MG/DL
VLDLC SERPL CALC-MCNC: 13 MG/DL (ref 1–30)

## 2025-08-13 PROCEDURE — 80053 COMPREHEN METABOLIC PANEL: CPT

## 2025-08-13 PROCEDURE — 36415 COLL VENOUS BLD VENIPUNCTURE: CPT

## 2025-08-13 PROCEDURE — 99213 OFFICE O/P EST LOW 20 MIN: CPT | Performed by: NURSE PRACTITIONER

## 2025-08-13 PROCEDURE — 3075F SYST BP GE 130 - 139MM HG: CPT | Performed by: NURSE PRACTITIONER

## 2025-08-13 PROCEDURE — 80061 LIPID PANEL: CPT

## 2025-08-13 PROCEDURE — 3078F DIAST BP <80 MM HG: CPT | Performed by: NURSE PRACTITIONER

## (undated) DEVICE — GOWN,AURORA,NONREINFORCED,LARGE: Brand: MEDLINE

## (undated) DEVICE — INTENDED FOR TISSUE SEPARATION, AND OTHER PROCEDURES THAT REQUIRE A SHARP SURGICAL BLADE TO PUNCTURE OR CUT.: Brand: BARD-PARKER ® CARBON RIB-BACK BLADES

## (undated) DEVICE — GLOVE SURG SZ 65 THK91MIL LTX FREE SYN POLYISOPRENE

## (undated) DEVICE — ENDO KIT W/SYRINGE: Brand: MEDLINE INDUSTRIES, INC.

## (undated) DEVICE — PACK PROCEDURE SURG GEN MIN SVMMC

## (undated) DEVICE — STERILE POLYISOPRENE POWDER-FREE SURGICAL GLOVES WITH EMOLLIENT COATING: Brand: PROTEXIS

## (undated) DEVICE — DEFENDO AIR WATER SUCTION AND BIOPSY VALVE KIT FOR  OLYMPUS: Brand: DEFENDO AIR/WATER/SUCTION AND BIOPSY VALVE

## (undated) DEVICE — GLOVE ORANGE PI 8 1/2   MSG9085

## (undated) DEVICE — GLOVE SURG SZ 6 THK91MIL LTX FREE SYN POLYISOPRENE ANTI

## (undated) DEVICE — ADHESIVE SKIN CLOSURE TOP 36 CC HI VISC DERMBND MINI

## (undated) DEVICE — FORCEPS BX L240CM JAW DIA22MM ORNG STD CAP W NDL RAD JAW 4

## (undated) DEVICE — APPLICATOR MEDICATED 26 CC SOLUTION HI LT ORNG CHLORAPREP

## (undated) DEVICE — SNARE ENDOSCP L240CM LOOP W13MM DIA2.4MM SHT THROW SM OVL

## (undated) DEVICE — SUTURE MCRYL SZ 4-0 L18IN ABSRB UD L16MM PC-3 3/8 CIR PRIM Y845G

## (undated) DEVICE — SUTURE NONABSORBABLE MONOFILAMENT 3-0 PS-1 18 IN BLK ETHILON 1663H

## (undated) DEVICE — SUTURE VCRL SZ 3-0 L27IN ABSRB UD L26MM SH 1/2 CIR J416H

## (undated) DEVICE — GLOVE ORANGE PI 7   MSG9070